# Patient Record
Sex: FEMALE | Race: WHITE | NOT HISPANIC OR LATINO | Employment: OTHER | URBAN - METROPOLITAN AREA
[De-identification: names, ages, dates, MRNs, and addresses within clinical notes are randomized per-mention and may not be internally consistent; named-entity substitution may affect disease eponyms.]

---

## 2017-01-03 RX ORDER — ZOLPIDEM TARTRATE 5 MG/1
TABLET ORAL
Qty: 30 TABLET | Refills: 2 | Status: SHIPPED | OUTPATIENT
Start: 2017-01-03 | End: 2017-04-10 | Stop reason: SDUPTHER

## 2017-03-07 ENCOUNTER — LAB VISIT (OUTPATIENT)
Dept: LAB | Facility: HOSPITAL | Age: 77
End: 2017-03-07
Attending: INTERNAL MEDICINE
Payer: MEDICARE

## 2017-03-07 DIAGNOSIS — I10 ESSENTIAL HYPERTENSION: ICD-10-CM

## 2017-03-07 LAB
ANION GAP SERPL CALC-SCNC: 9 MMOL/L
BUN SERPL-MCNC: 14 MG/DL
CALCIUM SERPL-MCNC: 9.5 MG/DL
CHLORIDE SERPL-SCNC: 100 MMOL/L
CO2 SERPL-SCNC: 31 MMOL/L
CREAT SERPL-MCNC: 0.8 MG/DL
EST. GFR  (AFRICAN AMERICAN): >60 ML/MIN/1.73 M^2
EST. GFR  (NON AFRICAN AMERICAN): >60 ML/MIN/1.73 M^2
GLUCOSE SERPL-MCNC: 110 MG/DL
POTASSIUM SERPL-SCNC: 3.8 MMOL/L
SODIUM SERPL-SCNC: 140 MMOL/L

## 2017-03-07 PROCEDURE — 36415 COLL VENOUS BLD VENIPUNCTURE: CPT | Mod: PO

## 2017-03-07 PROCEDURE — 80048 BASIC METABOLIC PNL TOTAL CA: CPT

## 2017-03-10 ENCOUNTER — TELEPHONE (OUTPATIENT)
Dept: INTERNAL MEDICINE | Facility: CLINIC | Age: 77
End: 2017-03-10

## 2017-03-10 NOTE — TELEPHONE ENCOUNTER
----- Message from Rosemarie Brooks MD sent at 3/10/2017  8:35 AM CST -----  Please review your lab work and we will discuss at your pending office visit.  Rosemarie Perez

## 2017-03-14 ENCOUNTER — OFFICE VISIT (OUTPATIENT)
Dept: INTERNAL MEDICINE | Facility: CLINIC | Age: 77
End: 2017-03-14
Payer: MEDICARE

## 2017-03-14 VITALS
DIASTOLIC BLOOD PRESSURE: 80 MMHG | WEIGHT: 159.38 LBS | HEART RATE: 75 BPM | HEIGHT: 63 IN | RESPIRATION RATE: 16 BRPM | TEMPERATURE: 98 F | SYSTOLIC BLOOD PRESSURE: 132 MMHG | BODY MASS INDEX: 28.24 KG/M2

## 2017-03-14 DIAGNOSIS — E78.5 HYPERLIPIDEMIA, UNSPECIFIED HYPERLIPIDEMIA TYPE: ICD-10-CM

## 2017-03-14 DIAGNOSIS — J04.0 LARYNGITIS: ICD-10-CM

## 2017-03-14 DIAGNOSIS — I10 ESSENTIAL HYPERTENSION: Primary | ICD-10-CM

## 2017-03-14 DIAGNOSIS — K21.9 GASTROESOPHAGEAL REFLUX DISEASE WITHOUT ESOPHAGITIS: ICD-10-CM

## 2017-03-14 DIAGNOSIS — I70.0 ATHEROSCLEROSIS OF AORTA: ICD-10-CM

## 2017-03-14 PROCEDURE — 1126F AMNT PAIN NOTED NONE PRSNT: CPT | Mod: S$GLB,,, | Performed by: INTERNAL MEDICINE

## 2017-03-14 PROCEDURE — 99499 UNLISTED E&M SERVICE: CPT | Mod: S$GLB,,, | Performed by: INTERNAL MEDICINE

## 2017-03-14 PROCEDURE — 1159F MED LIST DOCD IN RCRD: CPT | Mod: S$GLB,,, | Performed by: INTERNAL MEDICINE

## 2017-03-14 PROCEDURE — 3075F SYST BP GE 130 - 139MM HG: CPT | Mod: S$GLB,,, | Performed by: INTERNAL MEDICINE

## 2017-03-14 PROCEDURE — 1157F ADVNC CARE PLAN IN RCRD: CPT | Mod: S$GLB,,, | Performed by: INTERNAL MEDICINE

## 2017-03-14 PROCEDURE — 99999 PR PBB SHADOW E&M-EST. PATIENT-LVL III: CPT | Mod: PBBFAC,,, | Performed by: INTERNAL MEDICINE

## 2017-03-14 PROCEDURE — 1160F RVW MEDS BY RX/DR IN RCRD: CPT | Mod: S$GLB,,, | Performed by: INTERNAL MEDICINE

## 2017-03-14 PROCEDURE — 3079F DIAST BP 80-89 MM HG: CPT | Mod: S$GLB,,, | Performed by: INTERNAL MEDICINE

## 2017-03-14 PROCEDURE — 99214 OFFICE O/P EST MOD 30 MIN: CPT | Mod: S$GLB,,, | Performed by: INTERNAL MEDICINE

## 2017-03-14 RX ORDER — ESOMEPRAZOLE MAGNESIUM 40 MG/1
40 CAPSULE, DELAYED RELEASE ORAL EVERY MORNING
Refills: 1 | COMMUNITY
Start: 2017-02-17 | End: 2017-03-29

## 2017-03-14 RX ORDER — OMEPRAZOLE 20 MG/1
20 CAPSULE, DELAYED RELEASE ORAL DAILY
Refills: 0 | COMMUNITY
Start: 2017-02-08 | End: 2019-01-14 | Stop reason: SDUPTHER

## 2017-03-14 NOTE — PROGRESS NOTES
CC: HTN    76 y.o. female presents for HTN  Tx : chlorthalidone 25mg  Denied HA or dizziness  BP today==>132/80    Hoarseness, 7 weeks  No sore throat, went to  and had steroid injection and antibiotic injection and Rx antibioitic  When didn't recover and on rec of     ENT Consult, , ~   6 weeks ago, s/p scope , unremarkable and CT of head and neck  Also unremarkable, then started   Tx: Speech therapy, and resume PPI  Now w/ increased voice volume    GERD/tx resumed PPI ~ 6 wks ago  Pt is asx, ENT rec restart PPI @ o/v    HLD/aortic atherosclerosis, tx diet  Denied angina or SOB      MEDCARD: Reviewed  ROS:  No fever, chills ,or night sweats  No chest pain or palpitations  + initial cough, barking when occurs, infrequent w/o SOB or wheezing  No focal deficits or paresthesia  Remainder of review negative except as previously noted    PMHX: Reviewed  SHX: Reviewed  FHX: Reviewed    PE:  VSS:  GEN: WDWN, A&O, NAD, conversant and co-operative; pleasant as always  ++ hoarseness   EYES: Conj/lids unremarkable, sclera anicteric  ENT: Canals w/ some cerumen, right TM - injected , left unremarkable  Nasal mucosa/turbinates, w/o exudate or edema  O/P injected w/o exudate or edema; sinus NT  NECK: Supple w/o LN or TM  RESP: efforts unlabored, lungs CTA  CV: Heart RRR, no MGR, no carotid bruits noted  GI: BS + , soft, NT/ND, - HSM noted  MSK: Gait normal. No CCE  NEURO: BALES. No tremor or facial asymmetry  SKIN: Warm and dry    IMPRESSION:  HTN, improved  Hoarseness, laryngitis, w/up negative to date; improved w/ ST  GERD, resumed PPI  HLD, asx  Aortic atherosclerosis,asx    PLAN:  Continue present meds  Potassium rich foods  Reviewed GI precautions  Avoid fried, spicy, acidic foods  Avoid caffeine  Avoid carbonated beverages  Avoid ETOH  Avoid large meals  Avoid eating w/i 2 hrs of hs or exercise  Small, frequent meals best  Call prn  RTC 4 mos w/ lipid panel and CMP

## 2017-03-15 ENCOUNTER — TELEPHONE (OUTPATIENT)
Dept: INTERNAL MEDICINE | Facility: CLINIC | Age: 77
End: 2017-03-15

## 2017-03-27 ENCOUNTER — LAB VISIT (OUTPATIENT)
Dept: LAB | Facility: HOSPITAL | Age: 77
End: 2017-03-27
Attending: FAMILY MEDICINE
Payer: MEDICARE

## 2017-03-27 ENCOUNTER — OFFICE VISIT (OUTPATIENT)
Dept: INTERNAL MEDICINE | Facility: CLINIC | Age: 77
End: 2017-03-27
Payer: MEDICARE

## 2017-03-27 VITALS
HEIGHT: 63 IN | WEIGHT: 153.69 LBS | HEART RATE: 80 BPM | SYSTOLIC BLOOD PRESSURE: 110 MMHG | DIASTOLIC BLOOD PRESSURE: 76 MMHG | BODY MASS INDEX: 27.23 KG/M2 | TEMPERATURE: 98 F | RESPIRATION RATE: 16 BRPM

## 2017-03-27 DIAGNOSIS — R79.9 ABNORMAL FINDING OF BLOOD CHEMISTRY: ICD-10-CM

## 2017-03-27 DIAGNOSIS — R49.0 HOARSENESS, PERSISTENT: ICD-10-CM

## 2017-03-27 DIAGNOSIS — Z00.00 ROUTINE MEDICAL EXAM: ICD-10-CM

## 2017-03-27 DIAGNOSIS — J20.9 ACUTE BRONCHITIS, UNSPECIFIED ORGANISM: Primary | ICD-10-CM

## 2017-03-27 DIAGNOSIS — J06.9 UPPER RESPIRATORY TRACT INFECTION, UNSPECIFIED TYPE: ICD-10-CM

## 2017-03-27 LAB
ALBUMIN SERPL BCP-MCNC: 3 G/DL
ALP SERPL-CCNC: 77 U/L
ALT SERPL W/O P-5'-P-CCNC: 36 U/L
ANION GAP SERPL CALC-SCNC: 11 MMOL/L
AST SERPL-CCNC: 22 U/L
BASOPHILS # BLD AUTO: 0.02 K/UL
BASOPHILS NFR BLD: 0.2 %
BILIRUB SERPL-MCNC: 0.8 MG/DL
BUN SERPL-MCNC: 26 MG/DL
CALCIUM SERPL-MCNC: 9.5 MG/DL
CHLORIDE SERPL-SCNC: 97 MMOL/L
CO2 SERPL-SCNC: 28 MMOL/L
CREAT SERPL-MCNC: 1.5 MG/DL
DIFFERENTIAL METHOD: ABNORMAL
EOSINOPHIL # BLD AUTO: 0 K/UL
EOSINOPHIL NFR BLD: 0.3 %
ERYTHROCYTE [DISTWIDTH] IN BLOOD BY AUTOMATED COUNT: 13.6 %
EST. GFR  (AFRICAN AMERICAN): 39.3 ML/MIN/1.73 M^2
EST. GFR  (NON AFRICAN AMERICAN): 34.1 ML/MIN/1.73 M^2
GLUCOSE SERPL-MCNC: 131 MG/DL
HCT VFR BLD AUTO: 37.6 %
HGB BLD-MCNC: 12.6 G/DL
LYMPHOCYTES # BLD AUTO: 1.1 K/UL
LYMPHOCYTES NFR BLD: 9.9 %
MCH RBC QN AUTO: 30.5 PG
MCHC RBC AUTO-ENTMCNC: 33.5 %
MCV RBC AUTO: 91 FL
MONOCYTES # BLD AUTO: 1.4 K/UL
MONOCYTES NFR BLD: 13 %
NEUTROPHILS # BLD AUTO: 8.4 K/UL
NEUTROPHILS NFR BLD: 76 %
PLATELET # BLD AUTO: 194 K/UL
PMV BLD AUTO: 10.6 FL
POTASSIUM SERPL-SCNC: 3.9 MMOL/L
PROT SERPL-MCNC: 7.5 G/DL
RBC # BLD AUTO: 4.13 M/UL
SODIUM SERPL-SCNC: 136 MMOL/L
WBC # BLD AUTO: 11.1 K/UL

## 2017-03-27 PROCEDURE — 1160F RVW MEDS BY RX/DR IN RCRD: CPT | Mod: S$GLB,,, | Performed by: FAMILY MEDICINE

## 2017-03-27 PROCEDURE — 99999 PR PBB SHADOW E&M-EST. PATIENT-LVL III: CPT | Mod: PBBFAC,,, | Performed by: FAMILY MEDICINE

## 2017-03-27 PROCEDURE — 85025 COMPLETE CBC W/AUTO DIFF WBC: CPT

## 2017-03-27 PROCEDURE — 99214 OFFICE O/P EST MOD 30 MIN: CPT | Mod: S$GLB,,, | Performed by: FAMILY MEDICINE

## 2017-03-27 PROCEDURE — 1157F ADVNC CARE PLAN IN RCRD: CPT | Mod: S$GLB,,, | Performed by: FAMILY MEDICINE

## 2017-03-27 PROCEDURE — 36415 COLL VENOUS BLD VENIPUNCTURE: CPT | Mod: PO

## 2017-03-27 PROCEDURE — 3074F SYST BP LT 130 MM HG: CPT | Mod: S$GLB,,, | Performed by: FAMILY MEDICINE

## 2017-03-27 PROCEDURE — 3078F DIAST BP <80 MM HG: CPT | Mod: S$GLB,,, | Performed by: FAMILY MEDICINE

## 2017-03-27 PROCEDURE — 1159F MED LIST DOCD IN RCRD: CPT | Mod: S$GLB,,, | Performed by: FAMILY MEDICINE

## 2017-03-27 PROCEDURE — 1126F AMNT PAIN NOTED NONE PRSNT: CPT | Mod: S$GLB,,, | Performed by: FAMILY MEDICINE

## 2017-03-27 PROCEDURE — 80053 COMPREHEN METABOLIC PANEL: CPT

## 2017-03-27 RX ORDER — AZITHROMYCIN 250 MG/1
TABLET, FILM COATED ORAL
COMMUNITY
Start: 2017-03-25 | End: 2017-04-03

## 2017-03-29 DIAGNOSIS — N17.9 AKI (ACUTE KIDNEY INJURY): Primary | ICD-10-CM

## 2017-03-29 NOTE — PROGRESS NOTES
"Subjective:   Patient ID: Zack Hopper is a 76 y.o. female.    Chief Complaint: Fever; Chills; Cough; and URI      HPI  75 yo female here with her . Pt reports occ subjective fever, chills, and continuing cough. She reports illness for some time. she still has hoarseness. Saw ENT and had scope and had CT that was normal. Voice "not much better" and saw pcp 14 days ago for same problem. Reports "continuous" coughing that has improved significantly; not once did she cough in the room.     Her chief complaint is ongoing fatigue re illness.  requests TSH and blood cultures and I defer after my educ to him why.    Patient queried and denies any further complaints.    ALLERGIES AND MEDICATIONS: updated and reviewed.  Review of patient's allergies indicates:   Allergen Reactions    Sulfacetamide      Other reaction(s): Unknown       Current Outpatient Prescriptions:     azithromycin (Z-LD) 250 MG tablet, , Disp: , Rfl:     chlorthalidone (HYGROTEN) 25 MG Tab, Take 1 tablet (25 mg total) by mouth once daily., Disp: 90 tablet, Rfl: 1    ibuprofen (ADVIL) 200 MG tablet, Take 200 mg by mouth as needed. 1 Tablet Oral .  Last taken 12/14/11, Disp: , Rfl:     omeprazole (PRILOSEC) 20 MG capsule, Take 20 mg by mouth once daily., Disp: , Rfl: 0    zolpidem (AMBIEN) 5 MG Tab, take 1 tablet by mouth at bedtime if needed, Disp: 30 tablet, Rfl: 2    Review of Systems   Constitutional: Positive for appetite change and fatigue. Negative for chills and diaphoresis.   HENT: Positive for nosebleeds, rhinorrhea and voice change. Negative for sinus pressure, sneezing, sore throat, tinnitus and trouble swallowing.    Eyes: Negative for photophobia and visual disturbance.   Respiratory: Positive for cough. Negative for choking and wheezing.    Cardiovascular: Negative for chest pain and palpitations.   Gastrointestinal: Negative for abdominal distention, abdominal pain, constipation, diarrhea and vomiting. " "  Endocrine: Negative for polydipsia, polyphagia and polyuria.   Genitourinary: Negative for dysuria and flank pain.   Musculoskeletal: Negative for back pain and gait problem.   Skin: Negative for rash and wound.   Allergic/Immunologic: Negative for environmental allergies, food allergies and immunocompromised state.   Neurological: Negative for dizziness, light-headedness and numbness.   Hematological: Negative for adenopathy. Does not bruise/bleed easily.   Psychiatric/Behavioral: Positive for dysphoric mood. Negative for agitation, behavioral problems and confusion.       Objective:     Vitals:    03/27/17 0921   BP: 110/76   Pulse: 80   Resp: 16   Temp: 97.7 °F (36.5 °C)   TempSrc: Oral   Weight: 69.7 kg (153 lb 10.6 oz)   Height: 5' 3" (1.6 m)   PainSc: 0-No pain     Body mass index is 27.22 kg/(m^2).    Physical Exam   Constitutional: She is oriented to person, place, and time. She appears well-developed and well-nourished. She is cooperative. She does not have a sickly appearance. No distress.   HENT:   Head: Normocephalic and atraumatic.   Right Ear: Hearing, tympanic membrane, external ear and ear canal normal. No tenderness.   Left Ear: Hearing, tympanic membrane, external ear and ear canal normal. No tenderness.   Nose: Nose normal.   Mouth/Throat: Oropharynx is clear and moist. Normal dentition. No oropharyngeal exudate, posterior oropharyngeal edema or posterior oropharyngeal erythema.   Eyes: Conjunctivae, EOM and lids are normal. Pupils are equal, round, and reactive to light. Right eye exhibits no discharge. Left eye exhibits no discharge. Right conjunctiva is not injected. Left conjunctiva is not injected. No scleral icterus. Right eye exhibits normal extraocular motion. Left eye exhibits normal extraocular motion.   Neck: Normal range of motion. Neck supple. No JVD present. Carotid bruit is not present. No tracheal deviation and no edema present. No thyromegaly present.   Cardiovascular: Normal " rate, regular rhythm, normal heart sounds and normal pulses.  Exam reveals no friction rub.    No murmur heard.  Pulmonary/Chest: Effort normal and breath sounds normal. No accessory muscle usage or stridor. No respiratory distress. She has no wheezes. She has no rhonchi. She has no rales.   Abdominal: Soft. Bowel sounds are normal. She exhibits no distension. There is no tenderness.   Musculoskeletal: She exhibits no edema.   Lymphadenopathy:        Head (right side): No submandibular adenopathy present.        Head (left side): No submandibular adenopathy present.     She has no cervical adenopathy.   Neurological: She is alert and oriented to person, place, and time.   Skin: Skin is warm and dry. She is not diaphoretic.   Psychiatric: Her speech is normal. Her mood appears not anxious. Her affect is blunt. Her affect is not angry, not labile and not inappropriate. She is not withdrawn. She does not exhibit a depressed mood.   Very flat affect   relates 90% of hx  There are times when it is unclear if this hoarseness is not exaggerated by pt  She is attentive.       Assessment and Plan:   Zack was seen today for fever, chills, cough and uri.    Diagnoses and all orders for this visit:    Hoarseness, persistent--fu speech tx--advil as directed    Acute bronchitis.   Cont mucinex, zyrtec, hydrate, rest.     Routine medical exam  -     CBC auto differential; Future  -     Comprehensive metabolic panel; Future    Abnormal finding of blood chemistry   -     CBC auto differential; Future    Time spent in the evaluation and management of this patient exceeded 45 min and greater than 50% of this time was in face-to-face education regarding diagnoses, medications, plan, and follow-up.      Return in about 2 weeks (around 4/10/2017), or if symptoms worsen or fail to improve.    THIS NOTE WILL BE SHARED WITH THE PATIENT.

## 2017-03-31 ENCOUNTER — LAB VISIT (OUTPATIENT)
Dept: LAB | Facility: HOSPITAL | Age: 77
End: 2017-03-31
Attending: FAMILY MEDICINE
Payer: MEDICARE

## 2017-03-31 DIAGNOSIS — N17.9 AKI (ACUTE KIDNEY INJURY): ICD-10-CM

## 2017-03-31 LAB
ANION GAP SERPL CALC-SCNC: 12 MMOL/L
BUN SERPL-MCNC: 20 MG/DL
CALCIUM SERPL-MCNC: 9.2 MG/DL
CHLORIDE SERPL-SCNC: 104 MMOL/L
CO2 SERPL-SCNC: 26 MMOL/L
CREAT SERPL-MCNC: 1.1 MG/DL
EST. GFR  (AFRICAN AMERICAN): 57.2 ML/MIN/1.73 M^2
EST. GFR  (NON AFRICAN AMERICAN): 49.6 ML/MIN/1.73 M^2
GLUCOSE SERPL-MCNC: 110 MG/DL
POTASSIUM SERPL-SCNC: 4.2 MMOL/L
SODIUM SERPL-SCNC: 142 MMOL/L

## 2017-03-31 PROCEDURE — 80048 BASIC METABOLIC PNL TOTAL CA: CPT

## 2017-03-31 PROCEDURE — 36415 COLL VENOUS BLD VENIPUNCTURE: CPT | Mod: PO

## 2017-04-03 ENCOUNTER — OFFICE VISIT (OUTPATIENT)
Dept: INTERNAL MEDICINE | Facility: CLINIC | Age: 77
End: 2017-04-03
Payer: MEDICARE

## 2017-04-03 VITALS
HEIGHT: 63 IN | RESPIRATION RATE: 18 BRPM | BODY MASS INDEX: 27.15 KG/M2 | WEIGHT: 153.25 LBS | TEMPERATURE: 98 F | SYSTOLIC BLOOD PRESSURE: 130 MMHG | DIASTOLIC BLOOD PRESSURE: 74 MMHG | HEART RATE: 76 BPM

## 2017-04-03 DIAGNOSIS — J11.1 FLU SYNDROME: Primary | ICD-10-CM

## 2017-04-03 DIAGNOSIS — E86.0 DEHYDRATION, MILD: ICD-10-CM

## 2017-04-03 DIAGNOSIS — J37.0 LARYNGITIS, CHRONIC: ICD-10-CM

## 2017-04-03 DIAGNOSIS — N17.9 AKI (ACUTE KIDNEY INJURY): ICD-10-CM

## 2017-04-03 DIAGNOSIS — R53.83 FATIGUE, UNSPECIFIED TYPE: ICD-10-CM

## 2017-04-03 PROCEDURE — 1126F AMNT PAIN NOTED NONE PRSNT: CPT | Mod: S$GLB,,, | Performed by: INTERNAL MEDICINE

## 2017-04-03 PROCEDURE — 99499 UNLISTED E&M SERVICE: CPT | Mod: S$GLB,,, | Performed by: INTERNAL MEDICINE

## 2017-04-03 PROCEDURE — 1160F RVW MEDS BY RX/DR IN RCRD: CPT | Mod: S$GLB,,, | Performed by: INTERNAL MEDICINE

## 2017-04-03 PROCEDURE — 99214 OFFICE O/P EST MOD 30 MIN: CPT | Mod: S$GLB,,, | Performed by: INTERNAL MEDICINE

## 2017-04-03 PROCEDURE — 1159F MED LIST DOCD IN RCRD: CPT | Mod: S$GLB,,, | Performed by: INTERNAL MEDICINE

## 2017-04-03 PROCEDURE — 3078F DIAST BP <80 MM HG: CPT | Mod: S$GLB,,, | Performed by: INTERNAL MEDICINE

## 2017-04-03 PROCEDURE — 3075F SYST BP GE 130 - 139MM HG: CPT | Mod: S$GLB,,, | Performed by: INTERNAL MEDICINE

## 2017-04-03 PROCEDURE — 1157F ADVNC CARE PLAN IN RCRD: CPT | Mod: S$GLB,,, | Performed by: INTERNAL MEDICINE

## 2017-04-03 PROCEDURE — 99999 PR PBB SHADOW E&M-EST. PATIENT-LVL III: CPT | Mod: PBBFAC,,, | Performed by: INTERNAL MEDICINE

## 2017-04-03 NOTE — PROGRESS NOTES
CC: Flu f/up ( spouse +)    75 yo patient presents w/ resolving flu sx  Sx started:: 10+ days ago  ++ Fever,up to 102.9, ++ chills, and ++ night sweats, changing PJ's and sheets 3-4 x daily:  Tx; Zpak and steroid injection @ [facility]  Sudden onset  ++Myalgias  And arthralgias  Pt reported feeling 50+5 better, spouse reported much better than that even    Reviewed lab, YANN, w/ eGFR==>34, now 49.6  Pt /spouse reported she has been trying to hydrate, water and powerade  PT reported fatigue that she has not experienced w/ infection in the past    Reviewed EGD, 2015, ++ esophagitis, tx PPI, has on hold w/ illness    ROS:  No HA or focal deficits  + persistent laryngitis, ENT evaluation, no info on w/up received from her ENT  No diarrhea or constipation  ROS ; neg except as previously noted    PE:  VSS  GEN: WDWN, A&O, conversant and co-operative, pleasant, appeared fatigue  ++ laryngitis ( spouse reported pt improving w/ SPeech Therapy)  EYES: Conjunctiva/lids unremarkable  sclslra anicteric,PERRL, EOMI  ENT: Hearing intact; canals w/o significant cerumen; TM's unremarkable   Nasal mucosa/turbinates unremarkable injected boggy erythematous edematous  O/p unremarkable   Sinus NT   Stridor none  ++ Hoarseness   NECK: Supple w/o lymphadenopathy or thyromegaly; trachea midline  RESP: Efforts unlabored  LUNGS: CTAP  CV: RRR w/o mgr, , 1+ pedal pulses, no LE edema  GI: BS+, soft,, NT/ND, no HSM noted  MSK: Gait normal, no CCE  NEURO: BALES,, no tremor noted  SKIN: Warm and dry; slight tenting    IMPRESSION:  Flu, resolving  YANN, improved  Laryngitis, subacute  Fatigue, multifactorial  Dehydration, mild  Sleep disturbance, spouse reported exacerbated   pt to call for Sleep Consult when fatigue lessens    PLAN:  Vigorous hydration - water best  Acetaminophen  Reviewed lab, diet, exericse  Call prn  RTC 3 mos  30' o/v > 50% spent in review, rec, and MDM

## 2017-04-10 RX ORDER — ZOLPIDEM TARTRATE 5 MG/1
TABLET ORAL
Qty: 30 TABLET | Refills: 2 | Status: SHIPPED | OUTPATIENT
Start: 2017-04-10 | End: 2017-07-26 | Stop reason: SDUPTHER

## 2017-05-03 ENCOUNTER — HOSPITAL ENCOUNTER (OUTPATIENT)
Dept: RADIOLOGY | Facility: HOSPITAL | Age: 77
Discharge: HOME | End: 2017-05-03
Attending: FAMILY MEDICINE
Payer: MEDICARE

## 2017-05-03 ENCOUNTER — OFFICE VISIT (OUTPATIENT)
Dept: INTERNAL MEDICINE | Facility: CLINIC | Age: 77
End: 2017-05-03
Payer: MEDICARE

## 2017-05-03 ENCOUNTER — NURSE TRIAGE (OUTPATIENT)
Dept: ADMINISTRATIVE | Facility: CLINIC | Age: 77
End: 2017-05-03

## 2017-05-03 VITALS
SYSTOLIC BLOOD PRESSURE: 128 MMHG | DIASTOLIC BLOOD PRESSURE: 78 MMHG | WEIGHT: 161.19 LBS | BODY MASS INDEX: 28.56 KG/M2 | TEMPERATURE: 98 F | HEART RATE: 78 BPM | RESPIRATION RATE: 16 BRPM | HEIGHT: 63 IN

## 2017-05-03 DIAGNOSIS — J11.1 FLU SYNDROME: ICD-10-CM

## 2017-05-03 DIAGNOSIS — R06.00 DYSPNEA, UNSPECIFIED TYPE: Primary | ICD-10-CM

## 2017-05-03 DIAGNOSIS — R06.00 DYSPNEA, UNSPECIFIED TYPE: ICD-10-CM

## 2017-05-03 DIAGNOSIS — R49.0 CHRONIC HOARSENESS: ICD-10-CM

## 2017-05-03 PROCEDURE — 71020 XR CHEST PA AND LATERAL: CPT | Mod: TC,PO

## 2017-05-03 PROCEDURE — 99999 PR PBB SHADOW E&M-EST. PATIENT-LVL III: CPT | Mod: 25,PBBFAC,, | Performed by: FAMILY MEDICINE

## 2017-05-03 PROCEDURE — 71020 XR CHEST PA AND LATERAL: CPT | Mod: 26,,, | Performed by: RADIOLOGY

## 2017-05-03 PROCEDURE — 99499 UNLISTED E&M SERVICE: CPT | Mod: S$GLB,,, | Performed by: FAMILY MEDICINE

## 2017-05-03 PROCEDURE — 1159F MED LIST DOCD IN RCRD: CPT | Mod: S$GLB,,, | Performed by: FAMILY MEDICINE

## 2017-05-03 PROCEDURE — 3078F DIAST BP <80 MM HG: CPT | Mod: S$GLB,,, | Performed by: FAMILY MEDICINE

## 2017-05-03 PROCEDURE — 1160F RVW MEDS BY RX/DR IN RCRD: CPT | Mod: S$GLB,,, | Performed by: FAMILY MEDICINE

## 2017-05-03 PROCEDURE — 1126F AMNT PAIN NOTED NONE PRSNT: CPT | Mod: S$GLB,,, | Performed by: FAMILY MEDICINE

## 2017-05-03 PROCEDURE — 3074F SYST BP LT 130 MM HG: CPT | Mod: S$GLB,,, | Performed by: FAMILY MEDICINE

## 2017-05-03 PROCEDURE — 99214 OFFICE O/P EST MOD 30 MIN: CPT | Mod: S$GLB,,, | Performed by: FAMILY MEDICINE

## 2017-05-03 NOTE — PROGRESS NOTES
Subjective:   Patient ID: Zack Hopper is a 76 y.o. female.    Chief Complaint: Fatigue      HPI  77 yo female with long recovery from influenza and resultant bronchitis is here today with occasional dyspnea on exertion. She thinks her breathing is steady improving and she is overall getting slowly better but her  (who is not present today) was concerned. She also has had significant hoarseness since January. She saw ENT and is continuing speech therapy and her voice is still very hoarse but has improved significantly.     Since her illness, she has not been walking for exercise like she did before. She realizes she cannot now walk distances like she did before January but she does feel more fatigued and short of breath than she did BEFORE the illness; again, she feels like the dyspnea she does have at times now has IMPROVED since the height of her acute bronchitis and flu syndrome.     She denies any PND and sleeps on 2 pillows since January. She denies any LE edema.   She denies any chest pain or pressure or palpitations or near syncope.    Patient queried and denies any further complaints.      ALLERGIES AND MEDICATIONS: updated and reviewed.  Review of patient's allergies indicates:   Allergen Reactions    Sulfacetamide      Other reaction(s): Unknown       Current Outpatient Prescriptions:     chlorthalidone (HYGROTEN) 25 MG Tab, Take 1 tablet (25 mg total) by mouth once daily., Disp: 90 tablet, Rfl: 1    ibuprofen (ADVIL) 200 MG tablet, Take 200 mg by mouth as needed. 1 Tablet Oral .  Last taken 12/14/11, Disp: , Rfl:     omeprazole (PRILOSEC) 20 MG capsule, Take 20 mg by mouth once daily., Disp: , Rfl: 0    zolpidem (AMBIEN) 5 MG Tab, take 1 tablet by mouth at bedtime if needed, Disp: 30 tablet, Rfl: 2    Review of Systems   Constitutional: Positive for fatigue. Negative for activity change, appetite change, chills, diaphoresis and fever.   HENT: Positive for voice change. Negative for  "congestion, facial swelling, hearing loss, mouth sores and nosebleeds.    Eyes: Negative for photophobia and redness.   Respiratory: Negative for apnea, cough, choking, chest tightness and wheezing.    Cardiovascular: Negative for chest pain, palpitations and leg swelling.   Gastrointestinal: Negative for abdominal distention, abdominal pain, constipation, diarrhea, nausea and vomiting.   Endocrine: Negative for polyphagia and polyuria.   Genitourinary: Negative for dysuria and flank pain.   Musculoskeletal: Negative for back pain, joint swelling, myalgias and neck pain.   Skin: Negative for rash and wound.   Allergic/Immunologic: Negative for environmental allergies, food allergies and immunocompromised state.   Neurological: Negative for dizziness, seizures and numbness.   Hematological: Negative for adenopathy. Does not bruise/bleed easily.   Psychiatric/Behavioral: Negative for decreased concentration, dysphoric mood, self-injury, sleep disturbance and suicidal ideas. The patient is not nervous/anxious.        Objective:     Vitals:    05/03/17 1626   BP: 128/78   Pulse: 78   Resp: 16   Temp: 98 °F (36.7 °C)   TempSrc: Oral   Weight: 73.1 kg (161 lb 2.5 oz)   Height: 5' 3" (1.6 m)   PainSc: 0-No pain     Body mass index is 28.55 kg/(m^2).    Physical Exam   Constitutional: She is oriented to person, place, and time. She appears well-developed and well-nourished.   HENT:   Head: Normocephalic and atraumatic.   Very hoarse but still much less than I recall during last visit with me   Cardiovascular: Normal rate, regular rhythm and normal heart sounds.    No lower ext edema   Pulmonary/Chest: Effort normal and breath sounds normal. She has no rales.   Neurological: She is alert and oriented to person, place, and time.   Skin: Skin is warm and dry.   Psychiatric: She has a normal mood and affect. Her behavior is normal. Thought content normal.   Nursing note and vitals reviewed.      Assessment and Plan:   Zack" was seen today for fatigue.    Diagnoses and all orders for this visit:    Dyspnea, unspecified type  -     2D Echo w/ Color Flow Doppler; Future  -     Brain natriuretic peptide; Future  -     Basic metabolic panel; Future  -     CBC auto differential; Future  -     X-Ray Chest PA And Lateral; Future  Cont exercise as tolerated  Sodium restriction for now. No fluid restriction.     Essential hypertension, controlled. Cont chlorthalidone.    Chronic hoarseness  Cont speech therapy. Consider revisit to ENT.    Flu syndrome  Improving steadily  Cont exercise as tolerated    Time spent in the evaluation and management of this patient exceeded 45 min and greater than 50% of this time was in face-to-face education regarding diagnoses, medications, plan, and follow-up.    Return in about 2 weeks (around 5/17/2017).    THIS NOTE WILL BE SHARED WITH THE PATIENT.

## 2017-05-03 NOTE — MR AVS SNAPSHOT
[facility]   [facility]  [place] [address]  Phone: [phone]  Fax: [phone]                  Zack Hopper   5/3/2017 4:20 PM   Office Visit    Description:  Female : 1940   Provider:  Maxi Brandt MD   Department:  [facility]           Reason for Visit     Fatigue           Diagnoses this Visit        Comments    Dyspnea, unspecified type    -  Primary     Chronic hoarseness         Flu syndrome                To Do List           Future Appointments        Provider Department Dept Phone    2017 9:00 AM LAB, [facility] [phone]    2017 1:40 PM Rosemarie Brooks MD [facility] [phone]      Goals (5 Years of Data)     None      Follow-Up and Disposition     Return in about 2 weeks (around 2017).      [facility]     [facility] -  Assistance  Unless otherwise directed by your provider, please contact [facility], our nurse care line that is available for  assistance.     Registered nurses in the [facility] provide: appointment scheduling, clinical advisement, health education, and other advisory services.  Call: [phone] (toll free)               Medications           Message regarding Medications     Verify the changes and/or additions to your medication regime listed below are the same as discussed with your clinician today.  If any of these changes or additions are incorrect, please notify your healthcare provider.             Verify that the below list of medications is an accurate representation of the medications you are currently taking.  If none reported, the list may be blank. If incorrect, please contact your healthcare provider. Carry this list with you in case of emergency.           Current Medications     chlorthalidone (HYGROTEN) 25 MG Tab Take 1 tablet (25 mg total) by mouth once daily.     "ibuprofen (ADVIL) 200 MG tablet Take 200 mg by mouth as needed. 1 Tablet Oral .  Last taken 12/14/11    omeprazole (PRILOSEC) 20 MG capsule Take 20 mg by mouth once daily.    zolpidem (AMBIEN) 5 MG Tab take 1 tablet by mouth at bedtime if needed           Clinical Reference Information           Your Vitals Were     BP Pulse Temp Resp Height Weight    128/78 (BP Location: Left arm, Patient Position: Sitting, BP Method: Manual) 78 98 °F (36.7 °C) (Oral) 16 5' 3" (1.6 m) 73.1 kg (161 lb 2.5 oz)    BMI                28.55 kg/m2          Blood Pressure          Most Recent Value    BP  128/78      Allergies as of 5/3/2017     Sulfacetamide      Immunizations Administered on Date of Encounter - 5/3/2017     None      Orders Placed During Today's Visit     Future Labs/Procedures Expected by Expires    Basic metabolic panel  5/3/2017 7/2/2018    Brain natriuretic peptide  5/3/2017 7/2/2018    X-Ray Chest PA And Lateral  5/3/2017 5/3/2018    CBC auto differential  6/2/2017 (Approximate) 7/2/2018    2D Echo w/ Color Flow Doppler  As directed 5/3/2018      Instructions      Coping with Shortness of Breath: Controlling Stress  When you have lung problems, it may be hard for you to breathe. Stress can cause shortness of breath. Learn to relax and control stress to prevent shortness of breath and avoid panic.  When anxiety takes over  When you feel short of breath, your neck, shoulder, and chest muscles tense. You become anxious and begin to breathe faster. Your breathing muscles tire and trap air in your lungs. Your chest may feel tight. Anxiety increases and you may start to panic.  Stop Panic before it starts    The key to controlling panic is to break the cycle before it starts. When you are becoming short of breath do the following:  · Sit, relax your arms and shoulders.  · Lean forward, resting your upper body on your forearms.  · Breathe in slowly through your nose while counting to 2.  · Hold your lips together as if " you are trying to whistle or blow out a candle.  · Breathe out slowly and gently through your pursed lips while counting to 4.  · Repeat these steps as necessary.  Learn to relax  Control stress by avoiding things that trigger stress and by relaxing when you start feeling tense. Find a quiet place and sit or lie in a comfortable position. Close your eyes and try the following:  · Picture yourself in an ideal place, doing something you enjoy. Stay in that place until you feel relaxed.  · Slowly tense, then relax, each part of your body. Start with your toes and work up to your scalp. As you breathe in, tighten the muscles. Keep them tight for several seconds. Then relax as you breathe out.  · You can also relax by doing ramona chi or yoga, praying, meditating, or listening to music or relaxation tapes.  Talk with your health care provider about how you are feeling. It's important for your provider to understand what is going on and how it is affecting your life.  Date Last Reviewed: 9/2/2014 © 2000-2016 [employer]. [address] [place] [address]. All rights reserved. This information is not intended as a substitute for professional medical care. Always follow your healthcare professional's instructions.        Shortness of Breath (Dyspnea)  Shortness of breath is the feeling that you can't catch your breath or get enough air. It is also known as dyspnea.  Dyspnea can be caused by many different conditions. They include:  · Acute asthma attack.  · Worsening of chronic lung diseases such as chronic bronchitis and emphysema.  · Heart failure. This is when weak heart muscle allows extra fluid to collect in the lungs.  · Panic attacks or anxiety. Fear can cause rapid breathing (hyperventilation).  · Pneumonia, or an infection in the lung tissue.  · Exposure to toxic substances, fumes, smoke, or certain medicines.  · Blood clot in the lung (pulmonary embolism). This is often from a piece of blood  clot in a deep vein of the leg (deep vein thrombosis) that breaks off and travels to the lungs.  · Heart attack or heart-related chest pain (angina).  · Anemia.  · Collapsed lung (pneumothorax).  · Dehydration.  · Pregnancy.  Based on your visit today, the exact cause of your shortness of breath is not certain. Your tests dont show any of the serious causes of dyspnea. You may need other tests to find out if you have a serious problem. Its important to watch for any new symptoms or symptoms that get worse. Follow up with your healthcare provider as directed.  Home care  Follow these tips to take care of yourself at home:  · When your symptoms are better, go back to your usual activities.  · If you smoke, you should stop. Join a quit-smoking program or ask your healthcare provider for help.  · Eat a healthy diet and get plenty of sleep.  · Get regular exercise. Talk with your healthcare provider before starting to exercise, especially if you have other medical problems.  · Cut down on the amount of caffeine and stimulants you consume.  Follow-up care  Follow up with your healthcare provider, or as advised.  If tests were done, you will be told if your treatment needs to be changed. You can call as directed for the results.  (Note: If an X-ray was taken, a specialist will review it. You will be notified of any new findings that may affect your care.)  Call 911 or get immediate medical care  Shortness of breath may be a sign of a serious medical problem. For example, it may be a problem with your heart or lungs. Call 911 if you have worsening shortness of breath or trouble breathing, especially with any of the symptoms below:  · You are confused or its difficult to wake you.  · You faint or lose consciousness.  · You have a fast heartbeat, or your heartbeat is irregular.  · You are coughing up blood.  · You have pain in your chest, arm, shoulder, neck, or upper back.  · You break out in a sweat.  When to seek  medical advice  Call your healthcare provider right away if any of these occur:  · Slight shortness of breath or wheezing  · Redness, pain or swelling in your leg, arm, or other body area  · Swelling in both legs or ankles  · Fast weight gain  · Dizziness or weakness  · Fever of 100.4ºF (38ºC) or higher, or as directed by your healthcare provider  Date Last Reviewed: 9/13/2015 © 2000-2016 [employer]. [address] [place] [address], [place] [address]. All rights reserved. This information is not intended as a substitute for professional medical care. Always follow your healthcare professional's instructions.             Language Assistance Services     ATTENTION: Language assistance services are available, free of charge. Please call [phone].      ATENCIÓN: Si riccila leeroy, tiene a perea disposición servicios gratuitos de asistencia lingüística. Llame al [phone].     CHÚ Ý: N?u b?n nói Ti?ng Vi?t, có các d?ch v? h? tr? ngôn ng? mi?n phí dành cho b?n. G?i s? [phone].         [facility] complies with applicable Federal civil rights laws and does not discriminate on the basis of race, color, national origin, age, disability, or sex.

## 2017-05-03 NOTE — PATIENT INSTRUCTIONS
Coping with Shortness of Breath: Controlling Stress  When you have lung problems, it may be hard for you to breathe. Stress can cause shortness of breath. Learn to relax and control stress to prevent shortness of breath and avoid panic.  When anxiety takes over  When you feel short of breath, your neck, shoulder, and chest muscles tense. You become anxious and begin to breathe faster. Your breathing muscles tire and trap air in your lungs. Your chest may feel tight. Anxiety increases and you may start to panic.  Stop Panic before it starts    The key to controlling panic is to break the cycle before it starts. When you are becoming short of breath do the following:  · Sit, relax your arms and shoulders.  · Lean forward, resting your upper body on your forearms.  · Breathe in slowly through your nose while counting to 2.  · Hold your lips together as if you are trying to whistle or blow out a candle.  · Breathe out slowly and gently through your pursed lips while counting to 4.  · Repeat these steps as necessary.  Learn to relax  Control stress by avoiding things that trigger stress and by relaxing when you start feeling tense. Find a quiet place and sit or lie in a comfortable position. Close your eyes and try the following:  · Picture yourself in an ideal place, doing something you enjoy. Stay in that place until you feel relaxed.  · Slowly tense, then relax, each part of your body. Start with your toes and work up to your scalp. As you breathe in, tighten the muscles. Keep them tight for several seconds. Then relax as you breathe out.  · You can also relax by doing ramona chi or yoga, praying, meditating, or listening to music or relaxation tapes.  Talk with your health care provider about how you are feeling. It's important for your provider to understand what is going on and how it is affecting your life.  Date Last Reviewed: 9/2/2014  © 1182-0643 [employer]. [address] [place] [address], [place]  [address]. All rights reserved. This information is not intended as a substitute for professional medical care. Always follow your healthcare professional's instructions.        Shortness of Breath (Dyspnea)  Shortness of breath is the feeling that you can't catch your breath or get enough air. It is also known as dyspnea.  Dyspnea can be caused by many different conditions. They include:  · Acute asthma attack.  · Worsening of chronic lung diseases such as chronic bronchitis and emphysema.  · Heart failure. This is when weak heart muscle allows extra fluid to collect in the lungs.  · Panic attacks or anxiety. Fear can cause rapid breathing (hyperventilation).  · Pneumonia, or an infection in the lung tissue.  · Exposure to toxic substances, fumes, smoke, or certain medicines.  · Blood clot in the lung (pulmonary embolism). This is often from a piece of blood clot in a deep vein of the leg (deep vein thrombosis) that breaks off and travels to the lungs.  · Heart attack or heart-related chest pain (angina).  · Anemia.  · Collapsed lung (pneumothorax).  · Dehydration.  · Pregnancy.  Based on your visit today, the exact cause of your shortness of breath is not certain. Your tests dont show any of the serious causes of dyspnea. You may need other tests to find out if you have a serious problem. Its important to watch for any new symptoms or symptoms that get worse. Follow up with your healthcare provider as directed.  Home care  Follow these tips to take care of yourself at home:  · When your symptoms are better, go back to your usual activities.  · If you smoke, you should stop. Join a quit-smoking program or ask your healthcare provider for help.  · Eat a healthy diet and get plenty of sleep.  · Get regular exercise. Talk with your healthcare provider before starting to exercise, especially if you have other medical problems.  · Cut down on the amount of caffeine and stimulants you consume.  Follow-up care  Follow up  with your healthcare provider, or as advised.  If tests were done, you will be told if your treatment needs to be changed. You can call as directed for the results.  (Note: If an X-ray was taken, a specialist will review it. You will be notified of any new findings that may affect your care.)  Call 911 or get immediate medical care  Shortness of breath may be a sign of a serious medical problem. For example, it may be a problem with your heart or lungs. Call 911 if you have worsening shortness of breath or trouble breathing, especially with any of the symptoms below:  · You are confused or its difficult to wake you.  · You faint or lose consciousness.  · You have a fast heartbeat, or your heartbeat is irregular.  · You are coughing up blood.  · You have pain in your chest, arm, shoulder, neck, or upper back.  · You break out in a sweat.  When to seek medical advice  Call your healthcare provider right away if any of these occur:  · Slight shortness of breath or wheezing  · Redness, pain or swelling in your leg, arm, or other body area  · Swelling in both legs or ankles  · Fast weight gain  · Dizziness or weakness  · Fever of 100.4ºF (38ºC) or higher, or as directed by your healthcare provider  Date Last Reviewed: 9/13/2015  © 4264-6159 [employer]. [address] [place] [address], [place] [address]. All rights reserved. This information is not intended as a substitute for professional medical care. Always follow your healthcare professional's instructions.

## 2017-05-03 NOTE — TELEPHONE ENCOUNTER
Reason for Disposition   MODERATE weakness (i.e., interferes with work, school, normal activities) and cause unknown    Protocols used: ST WEAKNESS (GENERALIZED) AND FATIGUE-A-OH    Pt  states that pt is experiencing fatigue and SOB after exertion. Care advice given. appt made for today.

## 2017-05-04 ENCOUNTER — TELEPHONE (OUTPATIENT)
Dept: INTERNAL MEDICINE | Facility: CLINIC | Age: 77
End: 2017-05-04

## 2017-05-04 NOTE — TELEPHONE ENCOUNTER
"Please call pt and tell her that her chest xray show no signs of a pleural effusion ("fluid") and it is normal. Her labs were normal inc the BNP (N as in [place] is correct) but she was slightly anemic. I would simply repeat a cbc in 2-3 months. Then, if low again do a work-up. Thank you.   "

## 2017-05-04 NOTE — TELEPHONE ENCOUNTER
Called pt; left VM stating labs and xray were both normal; labs showed slight anemia; recommended repeating labs 2-3 months from now. Left my name and call back number for questions and concerns.

## 2017-05-08 ENCOUNTER — CLINICAL SUPPORT (OUTPATIENT)
Dept: CARDIOLOGY | Facility: CLINIC | Age: 77
End: 2017-05-08
Payer: MEDICARE

## 2017-05-08 DIAGNOSIS — R06.00 DYSPNEA, UNSPECIFIED TYPE: ICD-10-CM

## 2017-05-08 LAB
DIASTOLIC DYSFUNCTION: NO
ESTIMATED PA SYSTOLIC PRESSURE: 22.89
MITRAL VALVE REGURGITATION: NORMAL
RETIRED EF AND QEF - SEE NOTES: 65 (ref 55–65)
TRICUSPID VALVE REGURGITATION: NORMAL

## 2017-05-08 PROCEDURE — 93306 TTE W/DOPPLER COMPLETE: CPT | Mod: S$GLB,,, | Performed by: INTERNAL MEDICINE

## 2017-05-09 ENCOUNTER — TELEPHONE (OUTPATIENT)
Dept: INTERNAL MEDICINE | Facility: CLINIC | Age: 77
End: 2017-05-09

## 2017-05-09 DIAGNOSIS — D64.9 ANEMIA, UNSPECIFIED TYPE: Primary | ICD-10-CM

## 2017-05-09 NOTE — TELEPHONE ENCOUNTER
----- Message from Salas Whitfield MA sent at 5/9/2017  2:38 PM CDT -----  Contact: Jayant/spouse-[phone]  Please advise Pt is calling to schedule a Nurse Visit for a B-12 injection. Please call. Thanks!

## 2017-05-09 NOTE — TELEPHONE ENCOUNTER
I see where the pt saw Dr. Brandt recently. Pt was anemia. He recommended to recheck her cbc in 2-3 months. I do not see where vitamin b 12 was discussed.  Please advise.

## 2017-05-09 NOTE — TELEPHONE ENCOUNTER
B 12 not given unless documented B 12 deficiency  Can try and order   or she can start taking b12 1,000mcg SUBLINGUAL daily

## 2017-05-10 NOTE — TELEPHONE ENCOUNTER
Spoke to pt's , Jayant. I advised that the pt was not checked for vitamin B 12. I advised that injections are only recommended for a vitamin B 12 deficiency.  Lab requiring a waiver to be signed.   Jayant given number to [employer] to request a quote on vitamin B 12 lab.  Jayant advised that the pt can take OTC vitamin B 12 1000 mcg SL tablet daily.

## 2017-05-29 ENCOUNTER — INITIAL CONSULT (OUTPATIENT)
Dept: OTOLARYNGOLOGY | Facility: CLINIC | Age: 77
End: 2017-05-29
Payer: MEDICARE

## 2017-05-29 VITALS
TEMPERATURE: 96 F | WEIGHT: 153.25 LBS | SYSTOLIC BLOOD PRESSURE: 129 MMHG | HEART RATE: 75 BPM | BODY MASS INDEX: 27.14 KG/M2 | DIASTOLIC BLOOD PRESSURE: 79 MMHG

## 2017-05-29 DIAGNOSIS — J38.01 UNILATERAL COMPLETE VOCAL FOLD PARALYSIS: ICD-10-CM

## 2017-05-29 DIAGNOSIS — R49.9 VOICE DISTURBANCE: Primary | ICD-10-CM

## 2017-05-29 PROCEDURE — 1126F AMNT PAIN NOTED NONE PRSNT: CPT | Mod: S$GLB,,, | Performed by: OTOLARYNGOLOGY

## 2017-05-29 PROCEDURE — 99999 PR PBB SHADOW E&M-EST. PATIENT-LVL III: CPT | Mod: PBBFAC,,, | Performed by: OTOLARYNGOLOGY

## 2017-05-29 PROCEDURE — 1159F MED LIST DOCD IN RCRD: CPT | Mod: S$GLB,,, | Performed by: OTOLARYNGOLOGY

## 2017-05-29 PROCEDURE — 99204 OFFICE O/P NEW MOD 45 MIN: CPT | Mod: 25,S$GLB,, | Performed by: OTOLARYNGOLOGY

## 2017-05-29 NOTE — PATIENT INSTRUCTIONS
VOCAL FOLD INJECTION AUGMENTATION     Description   If the vocal folds (vocal cords) cannot close completely, you may experience voice problems: roughness, breathiness, inability to get loud, increased vocal effort, increased vocal fatigue. Some patients may also experience aspiration (coughing or choking with swallowing). Vocal fold injection augmentation plumps up the vocal fold and/or repositions it in the midline in order to help the vocal folds close completely while speaking or swallowing. Following the procedure, most patients experience a louder, stronger, clearer voice. The procedure also helps some patients protect against aspiration, although the swallowing improvement is not as dramatic as the voice improvement. We use the following materials for the procedure: hyaluronic acid (Restylane); carboxymethylcellulose (Radiesse Voice Gel); and calcium hydroxyapatite (Radiesse Voice). For most patients, the injection is performed with a small needle passed through the skin of the neck. However, in some patients we perform the injection with a device passed through the mouth. In either case, the injection is guided by the visualization provided by a scope passed through the nose.     What to expect during the procedure   We perform the injection in our office under local (topical) anesthesia. You are awake the whole time, and the entire procedure lasts about 15 minutes. Our primary focus is your safety and comfort. We usually make the larynx numb by spraying the throat and/or dripping anesthetic on the larynx. At this time, you may cough or gag, or you may have the sensation that you spilled some water down the wrong pipe. These are temporary sensations that allow us to get you numb. The numbing process takes about 2 minutes. We will not proceed until we know you will be as comfortable as possible. A small needle is passed either through the skin of the neck or via a long instrument through the mouth to  perform the injection. During the injection, you may experience mild discomfort in the throat. You may feel an unusual sensation in the ear because the larynx and the ear share the same sensory nerve. In rare cases in which a patient does not tolerate the procedure, it may be performed in the operating room, with the patient completely asleep under general anesthesia.     What to expect afterwards   Most patients note a stronger, less effortful voice immediately after the injection. Sometimes the voice is tight or pressed voice is noted right after the injection. The voice usually has good days and bad days and gradually improves until you reach your new baseline voice over the first 1-2 weeks. Voice therapy may be a necessary part of your treatment plan to optimize your vocal outcome. None of the materials we inject are permanent. As the material dissolves, you may experience a gradual worsening of voice quality over the course of several months. At this point we may consider repeating the injection. You may be a candidate for a permanent fix, which involves an open surgery in the neck performed in the operating room.     Instructions: before the procedure   1. Do not take aspirin-containing products or other medications that can thin the blood (such as ibuprofen, Advil, Motrin, Aleve, Plavix) for 7 days prior to the injection. If you take Coumadin (warfarin), you may need to stop using this a few days prior to the injection. If you are on blood thinning (anti-platelet or anti-coagulant) medication and it is not clear what you should do, please clarify this with your physician.   2. On the day of your procedure, please make sure you take your other regular morning medications.   3. On the day of your procedure, it is OK to eat and drink as you would normally, up until 3 hours before to your appointment time. During that time frame, we ask that you restrict yourself only to clear liquids. A clear liquid is anything  you can see through (water, ginger ale, apple juice).     Instructions: after the procedure   1. Please refrain from eating or drinking for 1 hour following the procedure. This allows time for the numbing medication to wear off.   2. Most patients experience very little pain. If necessary, most patients are able to keep comfortable with plain Tylenol (acetaminophen) and/or other non-steroidal anti-inflammatory medications such as ibuprofen (Advil, Motrin). Please follow package instructions if considering taking these medications.   3. In most instances, it is OK to use your voice immediately after the procedure. However, for the first week, you should avoid speaking over heavy background noise or in a very loud voice. It is rare, but in some cases you will be asked to rest your voice for the first 24 hours.   4. Please call the [facility] Center at [phone] if   · You have a temperature above 101°F   · You develop Increasing throat pain not relieved by over-the-counter medications   · You have any other post-operative questions or concerns   5. Please go immediately to the nearest emergency room if you are experiencing   · Shortness of breath   · Difficulty breathing   · Difficulty swallowing   · Severe bleeding     FREQUENTLY ASKED QUESTIONS     Is this a Botox injection? No. Botox weakens the voice box muscles. Instead, with a vocal fold injection augmentation, we are injecting filler material to bulk up the vocal fold(s).     How do you decide which material to use for the injection? Our decision is based on the indication for the procedure, the position of the vocal fold, and other patient historical/anatomical factors. In some instances, the approval of your insurance company is an important factor.     How to you decide which technique to use (through the neck versus through the mouth)? Patient anatomy and the position of the vocal fold play an important role. Other patient factors such as gag reflex are  also strongly considered.     Why do you perform this in your office instead of in the operating room? Performing the procedure in the office is safe and precise. In addition, performing the injection with the patient awake gives us direct visual and auditory feedback, which we do not get when the patient is asleep in the operating room. Furthermore, an office-based injection is much less time consuming, is more convenient for the patient, and does not involve the risks or the recovery time associated with general anesthesia. We can still do this in the operating room; we save that setting for specific diagnoses or situations, as well as for the rare patient who is unable to tolerate the awake procedure.     Why did I have discomfort in my ear (during or after the injection)? This is an example of referred pain. The ear and the larynx share the same sensory nerve.     I got an injection 3 days ago. Why is my voice still hoarse? To optimize vocal outcome, we overinject a little bit. Additionally, there may be a mild amount of swelling. Finally, the muscles of the larynx need to adjust to the injected material. Most people will have good days and bad days at first. After 1-2 weeks, you should settle out to your new baseline voice.     How long does the injection last? Carboxymethylcellulose (Radiesse Voice Gel) lasts approximately 1-2 months. Hyaluronic acid (Restylane) lasts approximately 4 months. Calcium hydroxyapatite (Radiesse Voice) lasts up to 1 year; however its characteristics are such that only few patients are appropriate for using this material.     Is there a permanent injectable material? No.     Can the injection be repeated? Yes. There is no limit to the number of times an injection can be repeated. However, a permanent surgical fix is often an option to consider.

## 2017-05-29 NOTE — PROGRESS NOTES
[facility]  Department of Otorhinolaryngology and Communication Sciences    Zack Hopper is a 76 y.o. female who presents to the [facility]  for consultation at the kind request of Dr. Allen for further management of hoarseness.     She complains of a weak voice, difficulty being understood, difficulty projecting her voice, phonatory dyspnea, increased vocal effort, and increased vocal fatigue. Onset was sudden. Duration is about 4 months, beginning in conjunction with a bad URI in January. Time course is constant. Symptoms are stable. Exacerbating factors include voice use. She denies any alleviating factors. She denies any associated symptoms.     She has been seen by Dr. Allen, who noted a vocal fold paralysis. She obtained a CT neck with contrast dated 2017.  I reviewed the report; relevant details include findings of a left vocal fold paralysis, without any evidence for etiology. She has had some speech therapy and derived some mild benefit.     Voice Handicap Index-10 (VHI-10) score is 33.   Reflux Symptom Index (RSI) score is 0.   Eating Assessment Tool-10 (EAT-10) score is 0.   Dyspnea Index (DI) score is 10.  Cough Severity Index (CSI) score is 0.    Past Medical History  She has a past medical history of DJD (degenerative joint disease); Dyslipidemia; Hypertension; Nuclear sclerosis - Both Eyes; Rosacea; and Vitamin D deficiency disease.    Past Surgical History  She has a past surgical history that includes  section, classic; Total knee arthroplasty; Appendectomy; Cholecystectomy; Shoulder open rotator cuff repair (Right); and breast biopsy, left.    Family History  Her family history includes Breast cancer (age of onset: 77) in her sister; Cataracts in her sister and sister; Diabetes in her brother and maternal grandfather; Hypertension in her mother and sister; Kidney failure in her brother; Other in her brother; Stroke in her father.    Social History  She reports that  she has never smoked. She has never used smokeless tobacco. She reports that she drinks about 0.6 oz of alcohol per week . She reports that she does not use drugs.    Allergies  She is allergic to sulfacetamide.    Medications  She has a current medication list which includes the following prescription(s): chlorthalidone, ibuprofen, omeprazole, and zolpidem.    Review of Systems   Constitutional: Negative for fever.   HENT: Negative for sore throat.    Eyes: Negative for visual disturbance.   Respiratory: Negative for wheezing.    Cardiovascular: Negative for chest pain.   Gastrointestinal: Negative for nausea.   Musculoskeletal: Negative for arthralgias.   Skin: Negative for rash.   Neurological: Negative for tremors.   Hematological: Does not bruise/bleed easily.   Psychiatric/Behavioral: The patient is not nervous/anxious.           Objective:     /79   Pulse 75   Temp (!) 95.7 °F (35.4 °C) (Tympanic)   Wt 69.5 kg (153 lb 3.5 oz)   BMI 27.14 kg/m²    Physical Exam    Constitutional: comfortable, well dressed  Psychiatric: appropriate affect  Respiratory: comfortably breathing, symmetric chest rise, no stridor  Voice: high pitched breathy, asthenic, moderately dysphonic  Cardiovascular: upper extremities non-edematous  Lymphatic: no cervical lymphadenopathy  Neurologic: alert and oriented to time, place, person, and situation; cranial nerves 3-12 grossly intact  Head: normocephalic  Eyes: conjunctivae and sclerae clear  Ears: normal pinnae, normal external auditory canals, tympanic membranes intact  Nose: mucosa pink and noncongested, no masses, no mucopurulence, no polyps  Oral cavity / oropharynx: no mucosal lesions  Neck: soft, full range of motion, laryngotracheal complex palpable with appropriate landmarks, larynx elevates on swallowing  Indirect laryngoscopy: limited due to gag    Procedure  Rigid Laryngeal Videostroboscopy (01362): Laryngeal videostroboscopy is indicated to assess the laryngeal  vibratory biomechanics and vocal fold oscillation, which cannot be assessed with a plain light examination. This was carried out with a 70 degree endoscope. After verbal consent was obtained, the patient was positioned and the tongue was gently secured with a gauze sponge. The endoscope was passed transorally and positioned to image the larynx and hypopharynx in detail. The following featured were examined: laryngeal and hypopharyngeal masses; vocal fold range and symmetry of motion; laryngeal mucosal edema, erythema, inflammation, and hydration; salivary pooling; and gross laryngeal sensation. During phonation, the vocal folds were assesses for glottal closure; mucosal wave; vocal fold lesions; vibratory periodicity, amplitude, and phase symmetry; and vertical height match. The equipment was removed. The patient tolerated the procedure well without complication. All findings were normal except:  - left vocal fold immobile, paramedian, bowed  - glottal insufficiency across all frequencies, moderate posterior gap, mild vertical height mismatch  - supraglottic hyperfunction, compensatory                Data Reviewed    see HPI      Assessment:     Zack Hopper is a 76 y.o. female with idiopathic left vocal fold immobility, potentially recoverable, onset of symptoms January 2017.       Plan:        I had a discussion with the patient and her family regarding her condition and the further workup and management options.      I educated the patient on the prognosis of newly identified vocal fold immobility and emphasized that it may take up to a year for the nerve to demonstrate its full recovery potential. In the meantime, treatment options include the following: a) no treatment and continued observation; b) voice therapy; c) a vocal fold injection augmentation procedure. She desires procedural intervention. The risks and benefits of vocal fold injection augmentation were discussed with the patient. Risks  included but were not limited to bleeding, infection, allergic reaction to the injectable, scarring, worsening of voice, early resorption, need for additional procedures, pain, epistaxis, and airway edema which could necessitate need for intubation or tracheostomy. We informed the patient that while in the past this procedure was performed mainly in the operating room under general anesthesia, we now primarily perform this procedure in our procedure suite without the need for general anesthesia.    All questions were answered and the patient would like to proceed with an office-based left vocal fold injection augmentation procedure. We will plan to use Restylane-L. Informed consent was obtained. We will arrange this in the coming weeks.    Yomi Horowitz M.D.  [facility]  Department of Otorhinolaryngology and Communication Sciences

## 2017-06-09 ENCOUNTER — PROCEDURE VISIT (OUTPATIENT)
Dept: OTOLARYNGOLOGY | Facility: CLINIC | Age: 77
End: 2017-06-09
Payer: MEDICARE

## 2017-06-09 VITALS
WEIGHT: 156.75 LBS | BODY MASS INDEX: 27.77 KG/M2 | TEMPERATURE: 98 F | HEART RATE: 70 BPM | DIASTOLIC BLOOD PRESSURE: 85 MMHG | SYSTOLIC BLOOD PRESSURE: 135 MMHG

## 2017-06-09 DIAGNOSIS — J38.01 UNILATERAL COMPLETE VOCAL FOLD PARALYSIS: Primary | ICD-10-CM

## 2017-06-09 PROCEDURE — 31574 LARGSC W/NJX AUGMENTATION: CPT | Mod: S$GLB,,, | Performed by: OTOLARYNGOLOGY

## 2017-06-09 PROCEDURE — L8607 INJ VOCAL CORD BULKING AGENT: HCPCS | Mod: S$GLB,,, | Performed by: OTOLARYNGOLOGY

## 2017-06-09 NOTE — PROCEDURES
Procedures   [facility]  Department of Otorhinolaryngology and Communication Sciences    Awake Laryngeal Procedure Operative Report    Preoperative Diagnosis: 1. Left vocal fold immobility.  2. Glottal insufficiency.  3. Dysphonia.    Postoperative Diagnosis: 1. Left vocal fold immobility.  2. Glottal insufficiency.  3. Dysphonia.    Procedure: Transnasal flexible magnified laryngoscopy with left vocal fold injection augmentation with hyaluronic acid (Restylane-L). [id number]    Surgeon: Yomi Horowitz M.D.     Estimated blood loss: None.    Anesthesia: Local and topical.    Complications: None.    Findings: Appropriate medialization, convexity, and support with a total volume of 0.9 mL hyaluronic acid (Restylane-L).    Indications for procedure: Zack Hopper is a 76 y.o. 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 The patient presents for elective vocal fold injection augmentation. Risks of the procedure were discussed, including but not limited to bleeding, infection, allergic reaction to the injectable or medication, scarring, worsening of voice, early resorption, need for additional procedures, pain, epistaxis, laryngospasm, and airway obstruction which could necessitate need for intubation or tracheostomy. All questions were answered and the patient agreed to move forward with the procedure. Informed consent was obtained.    Procedure in detail: Zack Hopper was positively identified with two identifiers and was brought into the procedure suite. She was seated upright in a comfortable position. Final time-out was performed for verification purposes. Local cutaneous and subcutaneous anesthesia was achieved with 1 mL of 2% lidocaine  injected into the skin and subcutaneous tissues at the level of the thyroid notch and into the pre-epiglottic space. Oropharyngeal anesthesia was not necessary. The nasal cavity was topically decongested with 1%  phenylephrine and topically anesthetized with 4% lidocaine. The distal chip digital videolaryngoscope was advanced through the patients most patent nasal cavity. The larynx was inspected under maximal magnification, with findings as noted above.    Attention was turned toward anesthetizing the endolarynx, which was achieved with a total volume of 2 mL of 4% lidocaine administered in one aliquot via a trans-cricothyroid membrane injection.    After an adequate degree of anesthesia was obtained, attention was turned to injection augmentation. A syringe pre-loaded with hyaluronic acid (Restylane-L) was loaded onto a 23 gauge 1.5 inch needle. Under the guidance of magnified laryngoscopy, the needle was advanced percutaneously, through the thyrohyoid membrane and infrapetiole epiglottis, into the endolarynx. The needle was advanced into the left vocal fold at the junction of the vocal process with the superior arcuate line. After confirmation of appropriate depth of the needle tip, careful injection augmentation of the left focal fold was carried out. Appropriate fullness, convexity, support, and medialization were achieved, with slight overcorrection, with a total volume of 0.9 mL injected. A pressed but less effortful voice, as well as a stronger cough, were noted immediately. The equipment was removed. The patient tolerated the procedure well without complication. All needle and instrument counts were correct at the completion of the case.    Attestation: As the attending of record, I was present and participated in all portions of this procedure.    Disposition: The patient was observed briefly before being discharged home in good condition. She was instructed to call the office or return to the emergency department should she develop a fever greater than 101 degrees F, increasing pain not relieved by over-the-counter medication, shortness of breath or noisy breathing, difficulty swallowing, swelling, bleeding, or  any other concerns. Post-procedure instructions were provided and were reviewed with the patient, who acknowledged understanding. She will follow up with me in about 4 weeks.    Yomi Horowitz M.D.  [facility]  Department of Otorhinolaryngology and Communication Sciences

## 2017-06-09 NOTE — PATIENT INSTRUCTIONS
VOCAL FOLD INJECTION AUGMENTATION     Description   If the vocal folds (vocal cords) cannot close completely, you may experience voice problems: roughness, breathiness, inability to get loud, increased vocal effort, increased vocal fatigue. Some patients may also experience aspiration (coughing or choking with swallowing). Vocal fold injection augmentation plumps up the vocal fold and/or repositions it in the midline in order to help the vocal folds close completely while speaking or swallowing. Following the procedure, most patients experience a louder, stronger, clearer voice. The procedure also helps some patients protect against aspiration, although the swallowing improvement is not as dramatic as the voice improvement. We use the following materials for the procedure: hyaluronic acid (Restylane); carboxymethylcellulose (Radiesse Voice Gel); and calcium hydroxyapatite (Radiesse Voice). For most patients, the injection is performed with a small needle passed through the skin of the neck. However, in some patients we perform the injection with a device passed through the mouth. In either case, the injection is guided by the visualization provided by a scope passed through the nose.     What to expect during the procedure   We perform the injection in our office under local (topical) anesthesia. You are awake the whole time, and the entire procedure lasts about 15 minutes. Our primary focus is your safety and comfort. We usually make the larynx numb by spraying the throat and/or dripping anesthetic on the larynx. At this time, you may cough or gag, or you may have the sensation that you spilled some water down the wrong pipe. These are temporary sensations that allow us to get you numb. The numbing process takes about 2 minutes. We will not proceed until we know you will be as comfortable as possible. A small needle is passed either through the skin of the neck or via a long instrument through the mouth to  perform the injection. During the injection, you may experience mild discomfort in the throat. You may feel an unusual sensation in the ear because the larynx and the ear share the same sensory nerve. In rare cases in which a patient does not tolerate the procedure, it may be performed in the operating room, with the patient completely asleep under general anesthesia.     What to expect afterwards   Most patients note a stronger, less effortful voice immediately after the injection. Sometimes the voice is tight or pressed voice is noted right after the injection. The voice usually has good days and bad days and gradually improves until you reach your new baseline voice over the first 1-2 weeks. Voice therapy may be a necessary part of your treatment plan to optimize your vocal outcome. None of the materials we inject are permanent. As the material dissolves, you may experience a gradual worsening of voice quality over the course of several months. At this point we may consider repeating the injection. You may be a candidate for a permanent fix, which involves an open surgery in the neck performed in the operating room.     Instructions: before the procedure   1. Do not take aspirin-containing products or other medications that can thin the blood (such as ibuprofen, Advil, Motrin, Aleve, Plavix) for 7 days prior to the injection. If you take Coumadin (warfarin), you may need to stop using this a few days prior to the injection. If you are on blood thinning (anti-platelet or anti-coagulant) medication and it is not clear what you should do, please clarify this with your physician.   2. On the day of your procedure, please make sure you take your other regular morning medications.   3. On the day of your procedure, it is OK to eat and drink as you would normally, up until 3 hours before to your appointment time. During that time frame, we ask that you restrict yourself only to clear liquids. A clear liquid is anything  you can see through (water, ginger ale, apple juice).     Instructions: after the procedure   1. Please refrain from eating or drinking for 1 hour following the procedure. This allows time for the numbing medication to wear off.   2. Most patients experience very little pain. If necessary, most patients are able to keep comfortable with plain Tylenol (acetaminophen) and/or other non-steroidal anti-inflammatory medications such as ibuprofen (Advil, Motrin). Please follow package instructions if considering taking these medications.   3. In most instances, it is OK to use your voice immediately after the procedure. However, for the first week, you should avoid speaking over heavy background noise or in a very loud voice. It is rare, but in some cases you will be asked to rest your voice for the first 24 hours.   4. Please call the [facility] Center at [phone] if   · You have a temperature above 101°F   · You develop Increasing throat pain not relieved by over-the-counter medications   · You have any other post-operative questions or concerns   5. Please go immediately to the nearest emergency room if you are experiencing   · Shortness of breath   · Difficulty breathing   · Difficulty swallowing   · Severe bleeding     FREQUENTLY ASKED QUESTIONS     Is this a Botox injection? No. Botox weakens the voice box muscles. Instead, with a vocal fold injection augmentation, we are injecting filler material to bulk up the vocal fold(s).     How do you decide which material to use for the injection? Our decision is based on the indication for the procedure, the position of the vocal fold, and other patient historical/anatomical factors. In some instances, the approval of your insurance company is an important factor.     How to you decide which technique to use (through the neck versus through the mouth)? Patient anatomy and the position of the vocal fold play an important role. Other patient factors such as gag reflex are  also strongly considered.     Why do you perform this in your office instead of in the operating room? Performing the procedure in the office is safe and precise. In addition, performing the injection with the patient awake gives us direct visual and auditory feedback, which we do not get when the patient is asleep in the operating room. Furthermore, an office-based injection is much less time consuming, is more convenient for the patient, and does not involve the risks or the recovery time associated with general anesthesia. We can still do this in the operating room; we save that setting for specific diagnoses or situations, as well as for the rare patient who is unable to tolerate the awake procedure.     Why did I have discomfort in my ear (during or after the injection)? This is an example of referred pain. The ear and the larynx share the same sensory nerve.     I got an injection 3 days ago. Why is my voice still hoarse? To optimize vocal outcome, we overinject a little bit. Additionally, there may be a mild amount of swelling. Finally, the muscles of the larynx need to adjust to the injected material. Most people will have good days and bad days at first. After 1-2 weeks, you should settle out to your new baseline voice.     How long does the injection last? Carboxymethylcellulose (Radiesse Voice Gel) lasts approximately 1-2 months. Hyaluronic acid (Restylane) lasts approximately 4 months. Calcium hydroxyapatite (Radiesse Voice) lasts up to 1 year; however its characteristics are such that only few patients are appropriate for using this material.     Is there a permanent injectable material? No.     Can the injection be repeated? Yes. There is no limit to the number of times an injection can be repeated. However, a permanent surgical fix is often an option to consider.

## 2017-07-19 NOTE — PROGRESS NOTES
[facility]  Department of Otorhinolaryngology and Communication Sciences    Subjective:      Zack Hopper is a 77 y.o. female who presents for follow-up. She has idiopathic left vocal fold immobility, potentially recoverable, onset of symptoms January 2017.    She underwent injection augmentation as below. She did well with the injection. She has recovered her voice to a great extent. Yet her voice does still remain limited. She reports difficulty projecting her voice and that it fatigues easily, particularly by the end of the day. Nevertheless, she remains pleased with her progress.    TREATMENT HISTORY:  6/9/2017: left vocal fold injection augmentation - Restylane-L    Voice Handicap Index - 10 (VHI-10) score is 16 (down from 33).    The patient's medications, allergies, past medical, surgical, social and family histories were reviewed and updated as appropriate.    A detailed review of systems was obtained with pertinent positives as per the above HPI, and otherwise negative.      Objective:     There were no vitals taken for this visit.     Constitutional: comfortable, well dressed  Psychiatric: appropriate affect  Respiratory: comfortably breathing, symmetric chest rise, no stridor  Voice: mild breathiness, mild asthenia, mild strain  Head: normocephalic  Eyes: conjunctivae and sclerae clear  Indirect laryngoscopy is limited due to gag    Procedure  Rigid Laryngeal Videostroboscopy (31176): Laryngeal videostroboscopy is indicated to assess the laryngeal vibratory biomechanics and vocal fold oscillation, which cannot be assessed with a plain light examination. This was carried out with a 70 degree endoscope. After verbal consent was obtained, the patient was positioned and the tongue was gently secured with a gauze sponge. The endoscope was passed transorally and positioned to image the larynx and hypopharynx in detail. The following featured were examined: laryngeal and hypopharyngeal masses;  vocal fold range and symmetry of motion; laryngeal mucosal edema, erythema, inflammation, and hydration; salivary pooling; and gross laryngeal sensation. During phonation, the vocal folds were assesses for glottal closure; mucosal wave; vocal fold lesions; vibratory periodicity, amplitude, and phase symmetry; and vertical height match. The equipment was removed. The patient tolerated the procedure well without complication. All findings were normal except:  - interval improvement in contour and support of the left vocal fold; immobile, paramedian  - complete glottal closure, though with persistent posterior glottic gap and vertical height mismatch  - compensatory supraglottic hyperfunction      Assessment:     Zack Hopper is a 77 y.o. female with idiopathic left vocal fold immobility, potentially recoverable, onset of symptoms January 2017. She has made progress following injection augmentation.     Plan:     Reassurance was provided. Should she desire additional treatment at this time, it would consist of SLP voice therapy, on which she defers at this time. I counseled her that the injectate usually resorbs over a 3-6 month timeframe. Depending on her progress, she may emerge as a candidate for thyroplasty. She will follow up with me in 3 months, or sooner if needed.    All questions were answered, and the patient is in agreement with the plan.    This visit was 15 minutes in duration, with over 50% of the time spent in direct face-to-face counseling and coordination of care regarding the issues outlined above.    Yomi Horowitz M.D.  [facility]  Department of Otorhinolaryngology and Communication Sciences

## 2017-07-21 ENCOUNTER — LAB VISIT (OUTPATIENT)
Dept: LAB | Facility: HOSPITAL | Age: 77
End: 2017-07-21
Attending: INTERNAL MEDICINE
Payer: MEDICARE

## 2017-07-21 ENCOUNTER — OFFICE VISIT (OUTPATIENT)
Dept: OTOLARYNGOLOGY | Facility: CLINIC | Age: 77
End: 2017-07-21
Payer: MEDICARE

## 2017-07-21 ENCOUNTER — TELEPHONE (OUTPATIENT)
Dept: INTERNAL MEDICINE | Facility: CLINIC | Age: 77
End: 2017-07-21

## 2017-07-21 VITALS
SYSTOLIC BLOOD PRESSURE: 130 MMHG | TEMPERATURE: 98 F | DIASTOLIC BLOOD PRESSURE: 74 MMHG | BODY MASS INDEX: 28.31 KG/M2 | WEIGHT: 159.81 LBS | HEART RATE: 66 BPM

## 2017-07-21 DIAGNOSIS — J38.01 UNILATERAL COMPLETE VOCAL FOLD PARALYSIS: Primary | ICD-10-CM

## 2017-07-21 DIAGNOSIS — E78.5 HYPERLIPIDEMIA, UNSPECIFIED HYPERLIPIDEMIA TYPE: ICD-10-CM

## 2017-07-21 DIAGNOSIS — R49.9 VOICE DISTURBANCE: ICD-10-CM

## 2017-07-21 DIAGNOSIS — I10 ESSENTIAL HYPERTENSION: ICD-10-CM

## 2017-07-21 LAB
ALBUMIN SERPL BCP-MCNC: 3.7 G/DL
ALP SERPL-CCNC: 62 U/L
ALT SERPL W/O P-5'-P-CCNC: 16 U/L
ANION GAP SERPL CALC-SCNC: 5 MMOL/L
AST SERPL-CCNC: 15 U/L
BILIRUB SERPL-MCNC: 0.9 MG/DL
BUN SERPL-MCNC: 19 MG/DL
CALCIUM SERPL-MCNC: 9.4 MG/DL
CHLORIDE SERPL-SCNC: 107 MMOL/L
CHOLEST/HDLC SERPL: 3.9 {RATIO}
CO2 SERPL-SCNC: 29 MMOL/L
CREAT SERPL-MCNC: 0.9 MG/DL
EST. GFR  (AFRICAN AMERICAN): >60 ML/MIN/1.73 M^2
EST. GFR  (NON AFRICAN AMERICAN): >60 ML/MIN/1.73 M^2
GLUCOSE SERPL-MCNC: 103 MG/DL
HDL/CHOLESTEROL RATIO: 25.5 %
HDLC SERPL-MCNC: 235 MG/DL
HDLC SERPL-MCNC: 60 MG/DL
LDLC SERPL CALC-MCNC: 158.4 MG/DL
NONHDLC SERPL-MCNC: 175 MG/DL
POTASSIUM SERPL-SCNC: 5 MMOL/L
PROT SERPL-MCNC: 6.9 G/DL
SODIUM SERPL-SCNC: 141 MMOL/L
TRIGL SERPL-MCNC: 83 MG/DL

## 2017-07-21 PROCEDURE — 1126F AMNT PAIN NOTED NONE PRSNT: CPT | Mod: S$GLB,,, | Performed by: OTOLARYNGOLOGY

## 2017-07-21 PROCEDURE — 99999 PR PBB SHADOW E&M-EST. PATIENT-LVL III: CPT | Mod: PBBFAC,,, | Performed by: OTOLARYNGOLOGY

## 2017-07-21 PROCEDURE — 31579 LARYNGOSCOPY TELESCOPIC: CPT | Mod: S$GLB,,, | Performed by: OTOLARYNGOLOGY

## 2017-07-21 PROCEDURE — 1159F MED LIST DOCD IN RCRD: CPT | Mod: S$GLB,,, | Performed by: OTOLARYNGOLOGY

## 2017-07-21 PROCEDURE — 99213 OFFICE O/P EST LOW 20 MIN: CPT | Mod: 25,S$GLB,, | Performed by: OTOLARYNGOLOGY

## 2017-07-21 NOTE — TELEPHONE ENCOUNTER
----- Message from Rosemarie Brooks MD sent at 7/21/2017 10:05 AM CDT -----  Please review your lab work and we will discuss at your pending office visit.  Rosemarie Perez

## 2017-07-26 ENCOUNTER — OFFICE VISIT (OUTPATIENT)
Dept: INTERNAL MEDICINE | Facility: CLINIC | Age: 77
End: 2017-07-26
Payer: MEDICARE

## 2017-07-26 VITALS
WEIGHT: 162.5 LBS | RESPIRATION RATE: 16 BRPM | TEMPERATURE: 98 F | SYSTOLIC BLOOD PRESSURE: 161 MMHG | DIASTOLIC BLOOD PRESSURE: 93 MMHG | OXYGEN SATURATION: 98 % | HEIGHT: 63 IN | HEART RATE: 72 BPM | BODY MASS INDEX: 28.79 KG/M2

## 2017-07-26 DIAGNOSIS — I70.0 AORTIC ATHEROSCLEROSIS: ICD-10-CM

## 2017-07-26 DIAGNOSIS — E78.5 HYPERLIPIDEMIA, UNSPECIFIED HYPERLIPIDEMIA TYPE: ICD-10-CM

## 2017-07-26 DIAGNOSIS — R92.8 ABNORMAL MAMMOGRAM: ICD-10-CM

## 2017-07-26 DIAGNOSIS — I10 ESSENTIAL HYPERTENSION: Primary | ICD-10-CM

## 2017-07-26 PROCEDURE — 99214 OFFICE O/P EST MOD 30 MIN: CPT | Mod: S$GLB,,, | Performed by: INTERNAL MEDICINE

## 2017-07-26 PROCEDURE — 1126F AMNT PAIN NOTED NONE PRSNT: CPT | Mod: S$GLB,,, | Performed by: INTERNAL MEDICINE

## 2017-07-26 PROCEDURE — 1159F MED LIST DOCD IN RCRD: CPT | Mod: S$GLB,,, | Performed by: INTERNAL MEDICINE

## 2017-07-26 PROCEDURE — 99999 PR PBB SHADOW E&M-EST. PATIENT-LVL III: CPT | Mod: PBBFAC,,, | Performed by: INTERNAL MEDICINE

## 2017-07-26 RX ORDER — ROSUVASTATIN CALCIUM 10 MG/1
10 TABLET, COATED ORAL DAILY
Qty: 90 TABLET | Refills: 3 | Status: SHIPPED | OUTPATIENT
Start: 2017-07-26 | End: 2019-01-14 | Stop reason: SDUPTHER

## 2017-07-26 RX ORDER — ZOLPIDEM TARTRATE 5 MG/1
5 TABLET ORAL NIGHTLY PRN
Qty: 30 TABLET | Refills: 2 | Status: SHIPPED | OUTPATIENT
Start: 2017-07-26 | End: 2018-01-29 | Stop reason: SDUPTHER

## 2017-07-26 NOTE — PROGRESS NOTES
CC: f/up HTN, HLD, aortic atherosclerosis, CKD II    77 y.o. female presents for HTN  Tx : chlorthalidone 25mg ( hasn't taken in 2 weeks)  Denied HA or dizziness  BP today==>163/91    Hoarseness, s/p  Augmentin, consideration for surgery  Now w/ increased voice volume    GERD/tx resumed PPI ~ 6 wks ago  Pt is asx, ENT rec restart PPI @ o/v    HLD/aortic atherosclerosis, tx diet  Denied angina or SOB  LDL==>158.4  CV Risk-31.2%      MEDCARD: Reviewed  ROS:  No fever, chills ,or night sweats  No chest pain or palpitations  No focal deficits or paresthesia  Remainder of review negative except as previously noted    PMHX: Reviewed  SHX: Reviewed  FHX: Reviewed    PE:  VSS:  GEN: WDWN, A&O, NAD, conversant and co-operative; pleasant as always  ++ hoarseness   EYES: Conj/lids unremarkable, sclera anicteric  ENT: Canals w/ some cerumen, right TM - injected , left unremarkable  Nasal mucosa/turbinates, w/o exudate or edema  O/P injected w/o exudate or edema; sinus NT  NECK: Supple w/o LN or TM  RESP: efforts unlabored, lungs CTA  CV: Heart RRR, no MGR, no carotid bruits noted  GI: BS + , soft, NT/ND, - HSM noted  MSK: Gait normal. No CCE  NEURO: BALES. No tremor or facial asymmetry  SKIN: Warm and dry    IMPRESSION:  HTN, elevated- off BP meds x 2 wks  HLD, asx  Aortic atherosclerosis,asx  Abn mammo-requests Rx for future update    PLAN:  Rx rosuvastatin 10mg  Resume BP meds  Resume low salt diet  Resume exercise  Call prn  RTC 3mos

## 2017-08-01 ENCOUNTER — HOSPITAL ENCOUNTER (OUTPATIENT)
Dept: RADIOLOGY | Facility: HOSPITAL | Age: 77
Discharge: HOME | End: 2017-08-01
Attending: INTERNAL MEDICINE
Payer: MEDICARE

## 2017-08-01 VITALS — BODY MASS INDEX: 28.7 KG/M2 | HEIGHT: 63 IN | WEIGHT: 162 LBS

## 2017-08-01 DIAGNOSIS — R92.8 ABNORMAL MAMMOGRAM: ICD-10-CM

## 2017-08-01 PROCEDURE — 77062 BREAST TOMOSYNTHESIS BI: CPT | Mod: 26,,, | Performed by: RADIOLOGY

## 2017-08-01 PROCEDURE — 77066 DX MAMMO INCL CAD BI: CPT | Mod: TC

## 2017-08-01 PROCEDURE — 77066 DX MAMMO INCL CAD BI: CPT | Mod: 26,,, | Performed by: RADIOLOGY

## 2017-08-30 ENCOUNTER — OFFICE VISIT (OUTPATIENT)
Dept: OPHTHALMOLOGY | Facility: CLINIC | Age: 77
End: 2017-08-30
Payer: MEDICARE

## 2017-08-30 DIAGNOSIS — H25.13 NUCLEAR SCLEROSIS, BILATERAL: Primary | ICD-10-CM

## 2017-08-30 PROCEDURE — 99999 PR PBB SHADOW E&M-EST. PATIENT-LVL II: CPT | Mod: PBBFAC,,, | Performed by: OPHTHALMOLOGY

## 2017-08-30 PROCEDURE — 92014 COMPRE OPH EXAM EST PT 1/>: CPT | Mod: S$GLB,,, | Performed by: OPHTHALMOLOGY

## 2017-08-30 NOTE — PROGRESS NOTES
"HPI     Cataract    Additional comments: Both Eyes.            Comments   DLS 08/17/2016 Dr Lopez    78 y/o female presents for cataract recheck.  States vision doesn't seem   to be as good as it was last year.  Using various "generic" OTC glasses as   needed.  Thinks that may be the cause.  Occasional burning OU, relieved   with AT's.  States she gets it mainly when she hasn't slept well.        Last edited by Amada Gamez on 8/30/2017 11:26 AM. (History)            Assessment /Plan     For exam results, see Encounter Report.    Nuclear sclerosis, bilateral      Incipient cataract: Patient does reports mild visual decline, but not sufficient to affect activities of daily living. I recommend monitoring visual status and follow up when visual symptoms worsen.                   "

## 2017-09-29 RX ORDER — CHLORTHALIDONE 25 MG/1
25 TABLET ORAL DAILY
Qty: 90 TABLET | Refills: 1 | Status: SHIPPED | OUTPATIENT
Start: 2017-09-29 | End: 2018-11-13 | Stop reason: SDUPTHER

## 2017-10-11 ENCOUNTER — TELEPHONE (OUTPATIENT)
Dept: ORTHOPEDICS | Facility: CLINIC | Age: 77
End: 2017-10-11

## 2017-10-11 ENCOUNTER — TELEPHONE (OUTPATIENT)
Dept: INTERNAL MEDICINE | Facility: CLINIC | Age: 77
End: 2017-10-11

## 2017-10-11 DIAGNOSIS — E78.5 HYPERLIPIDEMIA, UNSPECIFIED HYPERLIPIDEMIA TYPE: Primary | ICD-10-CM

## 2017-10-11 DIAGNOSIS — I10 BENIGN HYPERTENSION: ICD-10-CM

## 2017-10-11 NOTE — TELEPHONE ENCOUNTER
----- Message from Beatriz Son sent at 10/11/2017 11:36 AM CDT -----  Doctor appointment and lab have been scheduled.  Please link lab orders to the lab appointment.  Date of doctor appointment:  10/27  Physical or EP:  Physical   Date of lab appointment:  10/20  Comments:

## 2017-10-11 NOTE — TELEPHONE ENCOUNTER
Ortho Telephone Triage Message [phone]  Spoke with pt's , Jayant Hopper, and advised that appt scheduled tomorrow with FAVIOLA Norris PA-C is only appt, presently, available in [facility]. Noted pt has been seen for left low back pain and suggested pt may consider being  seen in [facility], for more convenient appt time, if left hip pain is similar.  states that pt is away at this time  and that she will keep tomorrow's Ortho appt.

## 2017-10-11 NOTE — TELEPHONE ENCOUNTER
----- Message from Andrea Luis sent at 10/11/2017  9:03 AM CDT -----  Contact: Jayant () [phone]  Pt's , Jayant, called in requesting an appt for pt's lt hip pain. Pt's current pain level is a 6/10 and began about 1 week ago. I was able to schedule pt for tomorrow (10/12 at 7:15 am with Lanie Norris) which Jayant agreed was okay, but Jayant would rather an appt either today or late morning. Jayant can be reached at [phone].

## 2017-10-12 ENCOUNTER — HOSPITAL ENCOUNTER (OUTPATIENT)
Dept: RADIOLOGY | Facility: HOSPITAL | Age: 77
Discharge: HOME | End: 2017-10-12
Attending: PHYSICIAN ASSISTANT
Payer: MEDICARE

## 2017-10-12 ENCOUNTER — OFFICE VISIT (OUTPATIENT)
Dept: ORTHOPEDICS | Facility: CLINIC | Age: 77
End: 2017-10-12
Payer: MEDICARE

## 2017-10-12 DIAGNOSIS — M25.552 LEFT HIP PAIN: ICD-10-CM

## 2017-10-12 DIAGNOSIS — M53.3 CHRONIC LEFT SI JOINT PAIN: ICD-10-CM

## 2017-10-12 DIAGNOSIS — G89.29 CHRONIC LEFT SI JOINT PAIN: ICD-10-CM

## 2017-10-12 DIAGNOSIS — G89.29 CHRONIC LEFT SI JOINT PAIN: Primary | ICD-10-CM

## 2017-10-12 DIAGNOSIS — M53.3 CHRONIC LEFT SI JOINT PAIN: Primary | ICD-10-CM

## 2017-10-12 PROCEDURE — 99999 PR PBB SHADOW E&M-EST. PATIENT-LVL II: CPT | Mod: PBBFAC,,, | Performed by: PHYSICIAN ASSISTANT

## 2017-10-12 PROCEDURE — 99213 OFFICE O/P EST LOW 20 MIN: CPT | Mod: S$GLB,,, | Performed by: PHYSICIAN ASSISTANT

## 2017-10-12 PROCEDURE — 72120 X-RAY BEND ONLY L-S SPINE: CPT | Mod: 26,,, | Performed by: RADIOLOGY

## 2017-10-12 PROCEDURE — 72100 X-RAY EXAM L-S SPINE 2/3 VWS: CPT | Mod: 26,,, | Performed by: RADIOLOGY

## 2017-10-12 PROCEDURE — 72100 X-RAY EXAM L-S SPINE 2/3 VWS: CPT | Mod: TC

## 2017-10-12 PROCEDURE — 99499 UNLISTED E&M SERVICE: CPT | Mod: S$GLB,,, | Performed by: PHYSICIAN ASSISTANT

## 2017-10-12 RX ORDER — MELOXICAM 15 MG/1
15 TABLET ORAL DAILY
Qty: 15 TABLET | Refills: 0 | Status: SHIPPED | OUTPATIENT
Start: 2017-10-12 | End: 2017-11-02 | Stop reason: SDUPTHER

## 2017-10-12 NOTE — PROGRESS NOTES
Subjective:      Patient ID: Zack Hopper is a 77 y.o. female.    Chief Complaint: No chief complaint on file.    HPI    Patient is a 77 year old female who presents to clinic with chief complaint of a-traumatic constant left hip pain. Pain is located in her SI joint and lower back area. Pain in increased at night and with increased activity. Pain will radiate at times down her leg to posterior calf.  She is taking Advil with mild relief. Exercises also helps some. Massage increased pain.  Patient denied groin pain, trauma, fever chills.     Review of Systems   Constitution: Negative for chills and fever.   Cardiovascular: Negative for chest pain.   Respiratory: Negative for cough and shortness of breath.    Skin: Negative for color change, dry skin, itching, nail changes, poor wound healing and rash.   Musculoskeletal:        Left hip pain.    Neurological: Negative for dizziness.   Psychiatric/Behavioral: Negative for altered mental status. The patient is not nervous/anxious.    All other systems reviewed and are negative.        Objective:            General    Constitutional: She is oriented to person, place, and time. She appears well-developed and well-nourished. No distress.   HENT:   Head: Atraumatic.   Eyes: Conjunctivae are normal.   Cardiovascular: Normal rate.    Pulmonary/Chest: Effort normal.   Neurological: She is alert and oriented to person, place, and time.   Psychiatric: She has a normal mood and affect. Her behavior is normal.         Left Hip Exam     Tenderness   The patient tender to palpation of the SI joint.    Range of Motion   The patient has normal left hip ROM.    Muscle Strength   The patient has normal left hip strength.     Tests   Log Roll: negative    Other   Sensation: normal          RADS:  Advanced multilevel degenerative changes are identified as discussed in detail above, with the disc narrowing and vertebral endplate spurring most pronounced at L1-2 and L5-S1, and with  anterolisthesis seen at the L4-5 level on the basis of underlying facet arthropathy.  Although the findings are similar to the exam of 8/16/12, there has been some progression of this degenerative change since that time, particularly at the L1-2 level, which exhibits considerably greater disc narrowing on the current exam.  No compression fracture.  No translational instability      Assessment:       Encounter Diagnosis   Name Primary?    Chronic left SI joint pain Yes          Plan:       Discussed treatment options with patient including rest ice, oral medication, PT, injection. At this time patient would like to :  - take Mobic x 2 weeks,   - she is to return to clinic as needed. If pain not relieved,

## 2017-10-20 ENCOUNTER — LAB VISIT (OUTPATIENT)
Dept: LAB | Facility: HOSPITAL | Age: 77
End: 2017-10-20
Payer: MEDICARE

## 2017-10-20 ENCOUNTER — OFFICE VISIT (OUTPATIENT)
Dept: OTOLARYNGOLOGY | Facility: CLINIC | Age: 77
End: 2017-10-20
Payer: MEDICARE

## 2017-10-20 VITALS
DIASTOLIC BLOOD PRESSURE: 85 MMHG | SYSTOLIC BLOOD PRESSURE: 149 MMHG | WEIGHT: 165.56 LBS | HEART RATE: 81 BPM | TEMPERATURE: 97 F | BODY MASS INDEX: 29.33 KG/M2

## 2017-10-20 DIAGNOSIS — I10 BENIGN HYPERTENSION: ICD-10-CM

## 2017-10-20 DIAGNOSIS — J38.01 UNILATERAL PARTIAL VOCAL FOLD PARALYSIS: Primary | ICD-10-CM

## 2017-10-20 DIAGNOSIS — E78.5 HYPERLIPIDEMIA, UNSPECIFIED HYPERLIPIDEMIA TYPE: ICD-10-CM

## 2017-10-20 DIAGNOSIS — J04.0 LARYNGITIS: ICD-10-CM

## 2017-10-20 DIAGNOSIS — R49.9 VOICE DISTURBANCE: ICD-10-CM

## 2017-10-20 LAB
ALBUMIN SERPL BCP-MCNC: 3.8 G/DL
ALP SERPL-CCNC: 77 U/L
ALT SERPL W/O P-5'-P-CCNC: 39 U/L
ANION GAP SERPL CALC-SCNC: 10 MMOL/L
AST SERPL-CCNC: 30 U/L
BASOPHILS # BLD AUTO: 0.04 K/UL
BASOPHILS NFR BLD: 0.8 %
BILIRUB SERPL-MCNC: 1 MG/DL
BUN SERPL-MCNC: 21 MG/DL
CALCIUM SERPL-MCNC: 10 MG/DL
CHLORIDE SERPL-SCNC: 104 MMOL/L
CHOLEST SERPL-MCNC: 167 MG/DL
CHOLEST/HDLC SERPL: 3 {RATIO}
CO2 SERPL-SCNC: 27 MMOL/L
CREAT SERPL-MCNC: 1.1 MG/DL
DIFFERENTIAL METHOD: NORMAL
EOSINOPHIL # BLD AUTO: 0.2 K/UL
EOSINOPHIL NFR BLD: 4.6 %
ERYTHROCYTE [DISTWIDTH] IN BLOOD BY AUTOMATED COUNT: 12.7 %
EST. GFR  (AFRICAN AMERICAN): 56.8 ML/MIN/1.73 M^2
EST. GFR  (NON AFRICAN AMERICAN): 49.2 ML/MIN/1.73 M^2
GLUCOSE SERPL-MCNC: 112 MG/DL
HCT VFR BLD AUTO: 39.6 %
HDLC SERPL-MCNC: 55 MG/DL
HDLC SERPL: 32.9 %
HGB BLD-MCNC: 13.2 G/DL
IMM GRANULOCYTES # BLD AUTO: 0.02 K/UL
IMM GRANULOCYTES NFR BLD AUTO: 0.4 %
LDLC SERPL CALC-MCNC: 91 MG/DL
LYMPHOCYTES # BLD AUTO: 1.3 K/UL
LYMPHOCYTES NFR BLD: 25.3 %
MCH RBC QN AUTO: 30.7 PG
MCHC RBC AUTO-ENTMCNC: 33.3 G/DL
MCV RBC AUTO: 92 FL
MONOCYTES # BLD AUTO: 0.6 K/UL
MONOCYTES NFR BLD: 11.4 %
NEUTROPHILS # BLD AUTO: 2.9 K/UL
NEUTROPHILS NFR BLD: 57.5 %
NONHDLC SERPL-MCNC: 112 MG/DL
NRBC BLD-RTO: 0 /100 WBC
PLATELET # BLD AUTO: 165 K/UL
PMV BLD AUTO: 10.4 FL
POTASSIUM SERPL-SCNC: 5 MMOL/L
PROT SERPL-MCNC: 7.7 G/DL
RBC # BLD AUTO: 4.3 M/UL
SODIUM SERPL-SCNC: 141 MMOL/L
TRIGL SERPL-MCNC: 105 MG/DL
TSH SERPL DL<=0.005 MIU/L-ACNC: 2.65 UIU/ML
WBC # BLD AUTO: 5.01 K/UL

## 2017-10-20 PROCEDURE — 80053 COMPREHEN METABOLIC PANEL: CPT

## 2017-10-20 PROCEDURE — 36415 COLL VENOUS BLD VENIPUNCTURE: CPT

## 2017-10-20 PROCEDURE — 99213 OFFICE O/P EST LOW 20 MIN: CPT | Mod: 25,S$GLB,, | Performed by: OTOLARYNGOLOGY

## 2017-10-20 PROCEDURE — 84443 ASSAY THYROID STIM HORMONE: CPT

## 2017-10-20 PROCEDURE — 85025 COMPLETE CBC W/AUTO DIFF WBC: CPT

## 2017-10-20 PROCEDURE — 31579 LARYNGOSCOPY TELESCOPIC: CPT | Mod: S$GLB,,, | Performed by: OTOLARYNGOLOGY

## 2017-10-20 PROCEDURE — 80061 LIPID PANEL: CPT

## 2017-10-20 PROCEDURE — 99999 PR PBB SHADOW E&M-EST. PATIENT-LVL III: CPT | Mod: PBBFAC,,, | Performed by: OTOLARYNGOLOGY

## 2017-10-20 NOTE — PROGRESS NOTES
[facility]  Department of Otorhinolaryngology and Communication Sciences    Subjective:      Zack Hopper is a 77 y.o. female who presents for follow-up. She has idiopathic left vocal fold immobility, potentially recoverable, onset of symptoms January 2017.    She underwent injection augmentation as below. She did well with the injection. Since her last visit, she feels her voice has remained stable and is essentially back to normal. At this time, she only notes mild strain and loss of strength in environments of excess background noise. She has been sick with a URI for the last 24-38 hours and has actually noted some slight improvement inher vocal quality.    TREATMENT HISTORY:  6/9/2017: left vocal fold injection augmentation - Restylane-L    Voice Handicap Index - 10 (VHI-10) score is 0 (down from 33).    The patient's medications, allergies, past medical, surgical, social and family histories were reviewed and updated as appropriate.    A detailed review of systems was obtained with pertinent positives as per the above HPI, and otherwise negative.      Objective:     There were no vitals taken for this visit.     Constitutional: comfortable, well dressed  Psychiatric: appropriate affect  Respiratory: comfortably breathing, symmetric chest rise, no stridor  Voice: normal for age and gender  Head: normocephalic  Eyes: conjunctivae and sclerae clear  Indirect laryngoscopy is limited due to gag    Procedure  Rigid Laryngeal Videostroboscopy (28154): Laryngeal videostroboscopy is indicated to assess the laryngeal vibratory biomechanics and vocal fold oscillation, which cannot be assessed with a plain light examination. This was carried out with a 70 degree endoscope. After verbal consent was obtained, the patient was positioned and the tongue was gently secured with a gauze sponge. The endoscope was passed transorally and positioned to image the larynx and hypopharynx in detail. The following  featured were examined: laryngeal and hypopharyngeal masses; vocal fold range and symmetry of motion; laryngeal mucosal edema, erythema, inflammation, and hydration; salivary pooling; and gross laryngeal sensation. During phonation, the vocal folds were assesses for glottal closure; mucosal wave; vocal fold lesions; vibratory periodicity, amplitude, and phase symmetry; and vertical height match. The equipment was removed. The patient tolerated the procedure well without complication. All findings were normal except:  - left vocal vocal foreshortened, with mild moition impairment; limited and sluggish adduction/abduction  - mild, diffuse, bilateral vocal fold edema and hypervascularity  - mild exudate along the infraglottic vocal folds      Assessment:     Zack Hopper is a 77 y.o. female with idiopathic left vocal fold motion impairment, onset of symptoms January 2017.     Her exam shows near-complete recovery of function. She incidentally has some signs of mild laryngitis, presumably viral in etiology.     Plan:     Reassurance was provided. I recommended symptomatic treatment, rest, and hydration regarding her URI. Should she fail to improve in 7-10 days, I recommended re-evaluation by her PCP.    She will follow up with me in 3 months, or sooner if needed.    All questions were answered, and the patient is in agreement with the plan.    Yomi Horowitz M.D.  [facility]  Department of Otorhinolaryngology and Communication Sciences

## 2017-10-23 ENCOUNTER — TELEPHONE (OUTPATIENT)
Dept: INTERNAL MEDICINE | Facility: CLINIC | Age: 77
End: 2017-10-23

## 2017-10-23 NOTE — TELEPHONE ENCOUNTER
----- Message from Rosemarie Brooks MD sent at 10/22/2017  7:16 PM CDT -----  Please review your lab work and we will discuss at your pending office visit.  Rosemarie Perez

## 2017-10-26 NOTE — PROGRESS NOTES
CC: Annual PE    77 y.o. female presents for PE  Non smoker  Social ETOH    HEALTH MAINTENANCE:  Cholesterol/labs: reviewed  C-scope: 2015-----------REC 5 yrs  One 3 mm polyp in the ascending colon. Resected   and retrieved. Tubular adenoma  - Melanosis in the colon.  - The examination was otherwise normal on direct   and retroflexion views.  EGD: 2015  Impression: - LA Grade B reflux esophagitis.  - Small hiatus hernia.  - Non-bleeding erosive gastropathy. Biopsied.  - Normal examined duodenum.  WWE: aged out, asx  Mammo: 2017, s/p bx- benign  DEXA: 2015, normal  EYE exam: UTD  DDS exam: UTD    VACCINATIONS:  TD: 2009  Flu: yearly  Pneumovax: 2014  Prevnar: 10/2015  Zostavax: 3/21/2011    MEDCARD: Reviewed  ROS:  No fever, chills, or night sweats  No visual disturbance or d/c  No ear or sinus pain or pressure  No dysphagia or early satiety  No chest pain or palpitations  No cough or wheezing  No PND or orthopnea  No GERD or abdominal pain  No change in bowels or blood in stool  No hematuria or dysuria;   No vaginal bleeding or pelvic pain or bloating  No skin rashes or lesions  No joint swelling or erythema  No unusual HA or focal deficits  No cold or heat intolerance  No increased thirst or urination  No unusual bruising or bleeding  ADL's: 100% independent  Memory: Good, delayed recall  Mental health: stress- has improved w/ time   w/ recent health scare and sister's breast cancer, improved w/ benign breast bx, sister  and pt has resumed her exercise that is very helpful  Advance Directives:+ will, need living will, and POA_ forms for AD given  Nutrtion: Good  Gait: No falls  Safety:Intact  Urinary incontinence: n/a  Remainder of review negative except as previously noted    PMHX:Reviewed  PSHX: Reviewed  SHX: Reviewed      PE:  VS: As noted  GENERAL: Well developed well nourished, alert and oriented in NAD  Conversant and co-operative  EYES: Conjunctiva and lids normal, sclera anicteric, PERRL,  EOMI  ENT: Hearing intact; canals free of cerumen ,   TM's unremarkable  Nasal mucosa/turbinates/septum unremarkable;   oropharynx w/o lesions or stridor  Sinus nontender  NECK: Supple w/o thyromegaly or lymphadenopathy  RESPIRATORY: Efforts are unlabored; lungs are CTAP  CARDIOVASCULAR: Heart RRR w/o mumur, gallop or rub; No carotid bruits noted  1+ pedal pulses; no LE edema noted  GASTROINTESTINAL: Bowel sounds are present, soft, nontender, nondistended  No hepatosplenomegaly noted.  MUSCULOSKELETAL: Gait normal. No clubbing, cyanosis, or edema noted.  NEUROLOGIC: BALES. No tremor noted  SKIN: Warm and dry    IMPRESSION:  Annual PE  FHx: Breast cancer  HTN, stable  CKD , III, asx  Hypercholesterolemia,asx   Aortic atherosclerosis, asx  Ascending aorta- ULN 3.7cm  Insomnia, chronic  GERD, occ  VItamin D defiicency, off supplements - will restart    PLAN:   HD flu vaccine  Repeat imaging of aorta - ULN, one year- 5/2018  Exercise  Avoid salt  Monitor BP  Continue present meds  Rx update  Vitamin D supplementation  Call prn  RTC 2-3 weeks for BP check

## 2017-10-27 ENCOUNTER — OFFICE VISIT (OUTPATIENT)
Dept: INTERNAL MEDICINE | Facility: CLINIC | Age: 77
End: 2017-10-27
Payer: MEDICARE

## 2017-10-27 DIAGNOSIS — E55.9 VITAMIN D DEFICIENCY: ICD-10-CM

## 2017-10-27 DIAGNOSIS — E78.5 HYPERLIPIDEMIA, UNSPECIFIED HYPERLIPIDEMIA TYPE: ICD-10-CM

## 2017-10-27 DIAGNOSIS — G47.00 INSOMNIA, UNSPECIFIED TYPE: ICD-10-CM

## 2017-10-27 DIAGNOSIS — K21.9 GASTROESOPHAGEAL REFLUX DISEASE WITHOUT ESOPHAGITIS: ICD-10-CM

## 2017-10-27 DIAGNOSIS — N18.30 CKD (CHRONIC KIDNEY DISEASE), STAGE III: ICD-10-CM

## 2017-10-27 DIAGNOSIS — I70.0 AORTIC ARCH ATHEROSCLEROSIS: ICD-10-CM

## 2017-10-27 DIAGNOSIS — Z00.00 ANNUAL PHYSICAL EXAM: Primary | ICD-10-CM

## 2017-10-27 DIAGNOSIS — I10 ESSENTIAL HYPERTENSION: ICD-10-CM

## 2017-10-27 PROCEDURE — 99999 PR PBB SHADOW E&M-EST. PATIENT-LVL III: CPT | Mod: PBBFAC,,, | Performed by: INTERNAL MEDICINE

## 2017-10-27 PROCEDURE — G0008 ADMIN INFLUENZA VIRUS VAC: HCPCS | Mod: S$GLB,,, | Performed by: INTERNAL MEDICINE

## 2017-10-27 PROCEDURE — 99499 UNLISTED E&M SERVICE: CPT | Mod: S$GLB,,, | Performed by: INTERNAL MEDICINE

## 2017-10-27 PROCEDURE — 99397 PER PM REEVAL EST PAT 65+ YR: CPT | Mod: S$GLB,,, | Performed by: INTERNAL MEDICINE

## 2017-10-27 PROCEDURE — 90662 IIV NO PRSV INCREASED AG IM: CPT | Mod: S$GLB,,, | Performed by: INTERNAL MEDICINE

## 2017-10-28 VITALS
BODY MASS INDEX: 29.38 KG/M2 | TEMPERATURE: 99 F | HEIGHT: 63 IN | OXYGEN SATURATION: 99 % | DIASTOLIC BLOOD PRESSURE: 66 MMHG | RESPIRATION RATE: 16 BRPM | HEART RATE: 76 BPM | WEIGHT: 165.81 LBS | SYSTOLIC BLOOD PRESSURE: 138 MMHG

## 2017-10-30 ENCOUNTER — TELEPHONE (OUTPATIENT)
Dept: ORTHOPEDICS | Facility: CLINIC | Age: 77
End: 2017-10-30

## 2017-10-30 NOTE — TELEPHONE ENCOUNTER
----- Message from Britton May sent at 10/30/2017 11:22 AM CDT -----  Contact: self  Pt called to see if there is any way she be seen sometime this week for severe rt hip pain. Pt states she would like to receive the injection that was discussed at her last visit. [phone]

## 2017-10-31 ENCOUNTER — TELEPHONE (OUTPATIENT)
Dept: ORTHOPEDICS | Facility: CLINIC | Age: 77
End: 2017-10-31

## 2017-10-31 NOTE — TELEPHONE ENCOUNTER
Spoke with patient's . Informed him that a message was sent to Amisha Brian to have patient scheduled for SI joint injection. He verbalized understanding and will follow up with Catheter lab.

## 2017-10-31 NOTE — TELEPHONE ENCOUNTER
----- Message from Edwin Swain sent at 10/31/2017  9:59 AM CDT -----  Contact: Mr. Munoz/  Mr. Munoz would like to speak with you regarding the status of the pts back inj. Mr. Munoz can be reached at [phone].

## 2017-11-01 ENCOUNTER — TELEPHONE (OUTPATIENT)
Dept: PAIN MEDICINE | Facility: CLINIC | Age: 77
End: 2017-11-01

## 2017-11-01 NOTE — TELEPHONE ENCOUNTER
Left message for pt's  to call.       ----- Message from Katty Cantu LPN sent at 11/1/2017 10:01 AM CDT -----   states [facility] staff sent an order to Dr. WITT to get an injection. He is looking to see when pt can get scheduled.     Thanks, Katty

## 2017-11-01 NOTE — TELEPHONE ENCOUNTER
"----- Message from Zack Prescott sent at 11/1/2017 12:58 PM CDT -----  Contact:  Jayant  [phone]  "I am waiting to hear from Amisha in ref to my wife"  "

## 2017-11-01 NOTE — TELEPHONE ENCOUNTER
"----- Message from Zack Prescott sent at 11/1/2017  9:08 AM CDT -----  Contact:  Jayant   "I need an appointment scheduled for my wife for an injection. Orthopedic states that they sent the orders to Dr Leal office. My wife is in a lot of pain.  I need the appointment soon.   I am leaving early Friday to go out of town.  Please call me back."  "

## 2017-11-02 ENCOUNTER — TELEPHONE (OUTPATIENT)
Dept: PAIN MEDICINE | Facility: CLINIC | Age: 77
End: 2017-11-02

## 2017-11-02 ENCOUNTER — TELEPHONE (OUTPATIENT)
Dept: ORTHOPEDICS | Facility: CLINIC | Age: 77
End: 2017-11-02

## 2017-11-02 RX ORDER — MELOXICAM 15 MG/1
15 TABLET ORAL DAILY
Qty: 30 TABLET | Refills: 3 | Status: SHIPPED | OUTPATIENT
Start: 2017-11-02 | End: 2017-12-02

## 2017-11-02 NOTE — TELEPHONE ENCOUNTER
----- Message from Ema Jay sent at 11/2/2017  9:48 AM CDT -----  Contact:    Pt's  stated that pt has not been able to get appt for her injection until after she comes back from out of town. Pt is leaving tomorrow for 3 weeks and is requesting a refill for meloxicam (MOBIC) 15 MG tablet. Please refill rx today because pt is leaving early tomorrow morning. Please call pt's  when this has been addressed. [phone]

## 2017-11-02 NOTE — TELEPHONE ENCOUNTER
Patient states Dr Norris sent orders for steroid injection and is requesting to schedule please advise

## 2017-11-02 NOTE — TELEPHONE ENCOUNTER
"----- Message from Zack Prescott sent at 11/1/2017 10:19 AM CDT -----  Contact:    [phone]  "Please call me in reference to scheduling an injection"  "

## 2017-11-20 NOTE — PROGRESS NOTES
CC: f/up HTN,  CKD II, and LBP w/ radiation into the LLE    77 y.o. female presents for HTN  Tx : chlorthalidone 25mg   Denied HA or dizziness  BP today==>122/60    LBP w/ radiation into the LLE  Tx: chiropractic care in [place], Tylenol  Pain 8-9/10, woke pt from sleep  Xrays- 10/12/2017   Advanced multilevel degenerative changes are identified as discussed in detail above, with the disc narrowing and vertebral endplate spurring most pronounced at L1-2 and L5-S1, and with anterolisthesis seen at the L4-5 level on the basis of underlying facet arthropathy.  Although the findings are similar to the exam of 8/16/12, there has been some progression of this degenerative change since that time, particularly at the L1-2 level, which exhibits considerably greater disc narrowing on the current exam.  No compression fracture.  No translational instability.  Interested in CORY    MEDCARD: Reviewed  ROS:  No fever, chills ,or night sweats  No chest pain or palpitations  No focal deficits or paresthesia  Remainder of review negative except as previously noted    PMHX: Reviewed  SHX: Reviewed  FHX: Reviewed    PE:  VSS:  GEN: WDWN, A&O, NAD, conversant and co-operative; pleasant as always    EYES: Conj/lids unremarkable, sclera anicteric  ENT: Canals w/ some cerumen, right TM - injected , left unremarkable  Nasal mucosa/turbinates, w/o exudate or edema  O/P injected w/o exudate or edema; sinus NT  NECK: Supple w/o LN or TM  RESP: efforts unlabored, lungs CTA  CV: Heart RRR, no MGR, no carotid bruits noted  MSK: Gait normal. No CCE  SPine:NT, + left upper buttock , neg SLR  NEURO: BALES. No tremor or facial asymmetry  SKIN: Warm and dry    IMPRESSION:  HTN, elevated- off BP meds x 2 wks  LBP w/ left radicular sx    PLAN:  MRI L-spine  Resume BP meds  Resume low salt diet  Resume exercise  Call prn  RTC 3mos

## 2017-11-22 ENCOUNTER — OFFICE VISIT (OUTPATIENT)
Dept: INTERNAL MEDICINE | Facility: CLINIC | Age: 77
End: 2017-11-22
Payer: MEDICARE

## 2017-11-22 VITALS
TEMPERATURE: 98 F | HEIGHT: 63 IN | SYSTOLIC BLOOD PRESSURE: 122 MMHG | HEART RATE: 60 BPM | RESPIRATION RATE: 14 BRPM | BODY MASS INDEX: 29.1 KG/M2 | DIASTOLIC BLOOD PRESSURE: 60 MMHG | WEIGHT: 164.25 LBS

## 2017-11-22 DIAGNOSIS — M79.605 LOW BACK PAIN RADIATING TO LEFT LOWER EXTREMITY: ICD-10-CM

## 2017-11-22 DIAGNOSIS — M54.50 LOW BACK PAIN RADIATING TO LEFT LOWER EXTREMITY: ICD-10-CM

## 2017-11-22 DIAGNOSIS — I10 HYPERTENSION, UNSPECIFIED TYPE: Primary | ICD-10-CM

## 2017-11-22 PROCEDURE — 99214 OFFICE O/P EST MOD 30 MIN: CPT | Mod: S$GLB,,, | Performed by: INTERNAL MEDICINE

## 2017-11-22 PROCEDURE — 99999 PR PBB SHADOW E&M-EST. PATIENT-LVL III: CPT | Mod: PBBFAC,,, | Performed by: INTERNAL MEDICINE

## 2017-11-22 PROCEDURE — 99499 UNLISTED E&M SERVICE: CPT | Mod: S$GLB,,, | Performed by: INTERNAL MEDICINE

## 2017-11-30 ENCOUNTER — HOSPITAL ENCOUNTER (OUTPATIENT)
Dept: RADIOLOGY | Facility: HOSPITAL | Age: 77
Discharge: HOME | End: 2017-11-30
Attending: INTERNAL MEDICINE
Payer: MEDICARE

## 2017-11-30 DIAGNOSIS — M54.50 LOW BACK PAIN RADIATING TO LEFT LOWER EXTREMITY: ICD-10-CM

## 2017-11-30 DIAGNOSIS — M79.605 LOW BACK PAIN RADIATING TO LEFT LOWER EXTREMITY: ICD-10-CM

## 2017-11-30 PROCEDURE — 72148 MRI LUMBAR SPINE W/O DYE: CPT | Mod: TC

## 2017-11-30 PROCEDURE — 72148 MRI LUMBAR SPINE W/O DYE: CPT | Mod: 26,,, | Performed by: RADIOLOGY

## 2017-12-01 ENCOUNTER — TELEPHONE (OUTPATIENT)
Dept: INTERNAL MEDICINE | Facility: CLINIC | Age: 77
End: 2017-12-01

## 2017-12-01 DIAGNOSIS — M48.00 SPINAL STENOSIS, UNSPECIFIED SPINAL REGION: Primary | ICD-10-CM

## 2017-12-01 NOTE — TELEPHONE ENCOUNTER
Please advise pt that her MRI revealed arthritis  And spinal stenosis     rec PM  Consult order placed

## 2017-12-04 ENCOUNTER — TELEPHONE (OUTPATIENT)
Dept: INTERNAL MEDICINE | Facility: CLINIC | Age: 77
End: 2017-12-04

## 2017-12-04 NOTE — TELEPHONE ENCOUNTER
----- Message from Jenny Willoughby sent at 12/1/2017  4:32 PM CST -----  Contact: Self [phone]  Patient is returning a phone call.  Who left a message for the patient: Emeli Rocha patient know what this is regarding:    Comments:

## 2017-12-04 NOTE — TELEPHONE ENCOUNTER
----- Message from Rosemarie Brooks MD sent at 12/3/2017  9:12 PM CST -----  Please note that your MRI revealed arthritis and spinal stenosis  Please call to review treatment options  Rosemarie Perez

## 2017-12-06 NOTE — PROGRESS NOTES
[facility] New Patient Evaluation    Referred by: Dr. Rosemarie Hastings  Reason for referral: Left low back pain    CC:   Chief Complaint   Patient presents with    Low-back Pain     left side    Rectal Pain     left side       HPI: Zack Hopper is a pleasant 77 y.o. female who presented to my clinic complaining of lower back pain as characterized below.    Location: lower back   Severity: Currently: 7/10   Typical Range: 5-8/10     Exacerbation: 9/10   Onset: a few months ago  Quality: Aching  Radiation: Right posterior thigh  Axial/Extremity Percentage of Pain: 90/10  Exacerbating Factors: standing for more than 5 minutes and walking for more than 5 minutes  Mitigating Factors: ice and medications  Assoc: denies night fever/night sweats, urinary incontinence, bowel incontinence, significant weight loss, significant motor weakness and loss of sensations    Previous Therapies:  PT: Denies  HEP: Endorses daily walks  TENS:  Injections: Denies  Surgery: Denies  Medications:    - NSAIDS: Yes   - MSK Relaxants:    - TCAs:    - SNRIs:    - Topicals:     Current Pain Medications:  1. Tylenol     Full Medication List:    Current Outpatient Prescriptions:     acetaminophen (TYLENOL) 500 MG tablet, Take 500 mg by mouth every 6 (six) hours as needed for Pain., Disp: , Rfl:     chlorthalidone (HYGROTEN) 25 MG Tab, Take 1 tablet (25 mg total) by mouth once daily., Disp: 90 tablet, Rfl: 1    ibuprofen (ADVIL) 200 MG tablet, Take 200 mg by mouth as needed. 1 Tablet Oral .  Last taken 12/14/11, Disp: , Rfl:     meloxicam (MOBIC) 15 MG tablet, Take 15 mg by mouth once daily., Disp: , Rfl: 0    omeprazole (PRILOSEC) 20 MG capsule, Take 20 mg by mouth once daily., Disp: , Rfl: 0    rosuvastatin (CRESTOR) 10 MG tablet, Take 1 tablet (10 mg total) by mouth once daily., Disp: 90 tablet, Rfl: 3    zolpidem (AMBIEN) 5 MG Tab, Take 1 tablet (5 mg total) by mouth nightly as needed., Disp: 30 tablet, Rfl: 2  "    Review of Systems:  Review of Systems   Constitutional: Negative for chills and fever.   HENT: Negative for nosebleeds.    Eyes: Negative for pain.   Respiratory: Negative for hemoptysis.    Cardiovascular: Negative for chest pain.   Gastrointestinal: Negative for nausea and vomiting.   Genitourinary: Negative for dysuria.   Musculoskeletal: Positive for back pain.   Skin: Negative for rash.   Neurological: Negative for tremors.   Endo/Heme/Allergies: Does not bruise/bleed easily.   Psychiatric/Behavioral: Negative for suicidal ideas.       Allergies:  Sulfacetamide     Medical History:  Past Medical History:   Diagnosis Date    DJD (degenerative joint disease)     Dyslipidemia     Hypertension     Nuclear sclerosis - Both Eyes 2012    Rosacea     Vitamin D deficiency disease         Surgical History:  Past Surgical History:   Procedure Laterality Date    APPENDECTOMY      BREAST BIOPSY Left     2015    BREAST BIOPSY Right     10/2015 ex bx- papilloma     breast biopsy, left      10/2015     SECTION, CLASSIC      Three times    CHOLECYSTECTOMY      SHOULDER OPEN ROTATOR CUFF REPAIR Right     TOTAL KNEE ARTHROPLASTY      right knee        Social History:  Social History     Social History    Marital status:      Spouse name: N/A    Number of children: N/A    Years of education: N/A     Occupational History    Not on file.     Social History Main Topics    Smoking status: Never Smoker    Smokeless tobacco: Never Used    Alcohol use 0.6 oz/week     1 Glasses of wine per week      Comment: occ    Drug use: No    Sexual activity: Not on file     Other Topics Concern    Not on file     Social History Narrative    No narrative on file       Physical Exam:  Vitals:    17 1545   BP: (!) 157/88   Pulse: 87   Resp: 18   Weight: 76.2 kg (168 lb 1.6 oz)   Height: 5' 3" (1.6 m)   PainSc:   8     General    Nursing note and vitals reviewed.  Constitutional: She is oriented " to person, place, and time. She appears well-developed and well-nourished. No distress.   HENT:   Head: Normocephalic and atraumatic.   Nose: Nose normal.   Eyes: Conjunctivae and EOM are normal. Pupils are equal, round, and reactive to light. Right eye exhibits no discharge. Left eye exhibits no discharge. No scleral icterus.   Neck: No JVD present.   Cardiovascular: Intact distal pulses.    Pulmonary/Chest: Effort normal. No respiratory distress.   Abdominal: She exhibits no distension.   Neurological: She is alert and oriented to person, place, and time. Coordination normal.   Psychiatric: She has a normal mood and affect. Her behavior is normal. Judgment and thought content normal.     General Musculoskeletal Exam   Gait: antalgic         Right Hip Exam     Tests   Pain w/ forced internal rotation (NAKITA): absent  Left Hip Exam     Tests   Pain w/ forced internal rotation (NAKITA): present      Back (L-Spine & T-Spine) / Neck (C-Spine) Exam     Tenderness Left paramedian tenderness of the Sacrum.     Back (L-Spine & T-Spine) Range of Motion   Extension: abnormal Back extension: facet loading positive on left.         Imaging:MRI Lumbar Spine Without Contrast   [id number] 11/30/17  05:58:48 [id number] ([facility]) : MRI LUMBAR SPINE WITHOUT CONTRAST    SUPPLIED CLINICAL HISTORY:  Low back pain    TECHNIQUE:  Multiplanar, multisequence images of the lumbar spine were obtained without the use of intravenous contrast.      COMPARISON: Lumbar spine radiograph 10/12/2017     FINDINGS:    There is grade 1 anterolisthesis of L4 on L5.  Vertebral body heights are well-maintained without signal abnormality to suggest acute fracture or infiltrative marrow placement process.  Intervertebral discs demonstrate multilevel degeneration with disc space height loss most prominent at L5-S1 and L1-L2.  Conus and lumbar spinal nerve roots are normal in signal.  Spinal cord terminates at the L1 vertebral level.  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    Limited evaluation of the surrounding soft tissues demonstrate no significant abnormalities.      T12-L1:  No focal disc abnormalities.  No significant spinal canal stenosis or neuroforaminal narrowing.    L1-2:  Circumferential disc bulge as well as mild bilateral facet hypertrophy results in mild spinal canal stenosis as well as severe right and moderate left neuroforaminal narrowing.      L2-3:  Circumferential disc bulge as well as mild bilateral facet hypertrophy results in moderate spinal canal stenosis as well as moderate bilateral neuroforaminal narrowing.      L3-4:  Circumferential disc bulge as well as moderate bilateral facet hypertrophy and ligamentum flavum thickening results in mild spinal canal stenosis as well as mild right and moderate left neuroforaminal narrowing      L4-5:  Grade 1 anterolisthesis along with moderate bilateral facet hypertrophy and ligamentum flavum thickening results in moderate spinal canal stenosis as well as mild bilateral neuroforaminal narrowing.      L5-S1:  Mild circumferential disc bulge as well as mild bilateral facet hypertrophy results in moderate right and severe left neuroforaminal narrowing.   Impression          1.  Multilevel spondylosis of the lumbar spine results in moderate spinal canal stenosis at L2-3 and L4-5 as well as severe right neuroforaminal narrowing at L1-2 and severe left neuroforaminal narrowing at L5-S1.    2.  Grade one anterolisthesis of L4 on L5.  ______________________________________     Electronically signed by resident: REFUGIO HERNANDEZ MD  Date: 11/30/17  Time: 10:59    As the supervising and teaching physician, I personally reviewed the images and resident's interpretation and I agree with the findings.    Electronically signed by: Willam Torres MD  Date: 11/30/17  Time: 15:28        X-Ray Lumbar Spine Ap Lateral w/Flex Ext  5 views of the lumbar spine were obtained, with AP, lateral, lumbosacral lateral, and lateral flexion and  extension images all submitted.    Mild scoliosis convex to the right in the lumbar region is seen, as is 7.5 mm of anterolisthesis of L4 in relation to L5, the latter alignment abnormality secondary to L4-5 facet arthropathy.  Alignment at other levels appears unremarkable, and there is no evidence of significant translational instability seen on the weight- bearing flexion/extension images.  Vertebral body heights are normally maintained, without compression deformity at any level.  There is prominent disc narrowing seen at every lumbar level, the most severe degrees of narrowing being at L1-2 and L5-S1, with a vacuum phenomenon indicative of disc desiccation seen at the L5-S1 level.  Marginal vertebral endplate spurring is observed, particularly at L1-2 and L5-S1.  Facet arthropathy is seen at L5-S1, in addition to that previously mentioned at L4-5.  Vertebral endplates are well-defined.  No osseous destructive process.  Surgical clips are seen in the right upper quadrant, and calcification in the wall of the abdominal aorta, without definable aneurysm, is also incidentally observed.   Impression      Advanced multilevel degenerative changes are identified as discussed in detail above, with the disc narrowing and vertebral endplate spurring most pronounced at L1-2 and L5-S1, and with anterolisthesis seen at the L4-5 level on the basis of underlying facet arthropathy.  Although the findings are similar to the exam of 8/16/12, there has been some progression of this degenerative change since that time, particularly at the L1-2 level, which exhibits considerably greater disc narrowing on the current exam.  No compression fracture.  No translational instability.      Electronically signed by: Willam Friedman MD  Date: 10/12/17  Time: 08:58          Labs:  BMP  Lab Results   Component Value Date     10/20/2017    K 5.0 10/20/2017     10/20/2017    CO2 27 10/20/2017    BUN 21 10/20/2017    CREATININE 1.1  10/20/2017    CALCIUM 10.0 10/20/2017    ANIONGAP 10 10/20/2017    ESTGFRAFRICA 56.8 (A) 10/20/2017    EGFRNONAA 49.2 (A) 10/20/2017     Lab Results   Component Value Date    ALT 39 10/20/2017    AST 30 10/20/2017    ALKPHOS 77 10/20/2017    BILITOT 1.0 10/20/2017       Diagnoses & Associated Orders:  Problem List Items Addressed This Visit     Lumbar spondylosis    Myofascial pain syndrome    DDD (degenerative disc disease), lumbosacral    Spinal stenosis of lumbosacral region        78 yo F with axial right lower back pain due to SI dysfunction and facet arthopathy without evidence of radiculopathy at this time.  While severe DDD and NFS may be a component of her pain, exam and HPI is most consistent with SI as the primary pain generator as this time.     Recommendations & Interventions:   Procedures: Start with LEFT SI injection x1.  Consider LEFT L3,4,5 MBB/RFA sequence  Medications: None recommended at this time.  Imaging: No additional at this time  PT/OT: She endorses frequent exercise but may benefit from referral to PT for SI joint.  HEP: Cont walking daily as tolerated  Diet:   Other:   Follow Up: RTC 2-4 weeks p inj    Ricci Lockett Jr, MD  Interventional Pain Medicine / Anesthesiology    Disclaimer: This note was partly generated using dictation software which may occasionally result in transcription errors.

## 2017-12-07 ENCOUNTER — OFFICE VISIT (OUTPATIENT)
Dept: PAIN MEDICINE | Facility: CLINIC | Age: 77
End: 2017-12-07
Payer: MEDICARE

## 2017-12-07 VITALS
DIASTOLIC BLOOD PRESSURE: 88 MMHG | SYSTOLIC BLOOD PRESSURE: 157 MMHG | RESPIRATION RATE: 18 BRPM | HEART RATE: 87 BPM | BODY MASS INDEX: 29.79 KG/M2 | HEIGHT: 63 IN | WEIGHT: 168.13 LBS

## 2017-12-07 DIAGNOSIS — M47.816 LUMBAR SPONDYLOSIS: ICD-10-CM

## 2017-12-07 DIAGNOSIS — M79.18 MYOFASCIAL PAIN SYNDROME: ICD-10-CM

## 2017-12-07 DIAGNOSIS — M51.37 DDD (DEGENERATIVE DISC DISEASE), LUMBOSACRAL: ICD-10-CM

## 2017-12-07 DIAGNOSIS — M48.07 SPINAL STENOSIS OF LUMBOSACRAL REGION: ICD-10-CM

## 2017-12-07 PROBLEM — M51.379 DDD (DEGENERATIVE DISC DISEASE), LUMBOSACRAL: Status: ACTIVE | Noted: 2017-12-07

## 2017-12-07 PROCEDURE — 99499 UNLISTED E&M SERVICE: CPT | Mod: S$GLB,,, | Performed by: PAIN MEDICINE

## 2017-12-07 PROCEDURE — 99999 PR PBB SHADOW E&M-EST. PATIENT-LVL III: CPT | Mod: PBBFAC,,, | Performed by: PAIN MEDICINE

## 2017-12-07 PROCEDURE — 99204 OFFICE O/P NEW MOD 45 MIN: CPT | Mod: S$GLB,,, | Performed by: PAIN MEDICINE

## 2017-12-07 RX ORDER — MELOXICAM 15 MG/1
15 TABLET ORAL DAILY
Refills: 0 | COMMUNITY
Start: 2017-11-02 | End: 2018-04-13

## 2017-12-07 RX ORDER — ACETAMINOPHEN 500 MG
500 TABLET ORAL EVERY 6 HOURS PRN
COMMUNITY
End: 2019-02-13

## 2017-12-12 ENCOUNTER — TELEPHONE (OUTPATIENT)
Dept: PAIN MEDICINE | Facility: HOSPITAL | Age: 77
End: 2017-12-12

## 2017-12-14 ENCOUNTER — HOSPITAL ENCOUNTER (OUTPATIENT)
Facility: HOSPITAL | Age: 77
Discharge: HOME | End: 2017-12-14
Attending: PAIN MEDICINE | Admitting: PAIN MEDICINE
Payer: MEDICARE

## 2017-12-14 ENCOUNTER — SURGERY (OUTPATIENT)
Age: 77
End: 2017-12-14

## 2017-12-14 VITALS
BODY MASS INDEX: 29.23 KG/M2 | SYSTOLIC BLOOD PRESSURE: 155 MMHG | DIASTOLIC BLOOD PRESSURE: 80 MMHG | WEIGHT: 165 LBS | TEMPERATURE: 98 F | HEIGHT: 63 IN | OXYGEN SATURATION: 100 % | HEART RATE: 69 BPM | RESPIRATION RATE: 16 BRPM

## 2017-12-14 DIAGNOSIS — M53.3 SI (SACROILIAC) JOINT DYSFUNCTION: Primary | ICD-10-CM

## 2017-12-14 PROCEDURE — 27096 INJECT SACROILIAC JOINT: CPT | Performed by: PAIN MEDICINE

## 2017-12-14 PROCEDURE — 25000003 PHARM REV CODE 250: Performed by: PAIN MEDICINE

## 2017-12-14 PROCEDURE — 63600175 PHARM REV CODE 636 W HCPCS: Performed by: PAIN MEDICINE

## 2017-12-14 PROCEDURE — 25500020 PHARM REV CODE 255: Performed by: PAIN MEDICINE

## 2017-12-14 PROCEDURE — 27096 INJECT SACROILIAC JOINT: CPT | Mod: LT,,, | Performed by: PAIN MEDICINE

## 2017-12-14 RX ORDER — TRIAMCINOLONE ACETONIDE 40 MG/ML
INJECTION, SUSPENSION INTRA-ARTICULAR; INTRAMUSCULAR
Status: DISCONTINUED | OUTPATIENT
Start: 2017-12-14 | End: 2017-12-14 | Stop reason: HOSPADM

## 2017-12-14 RX ORDER — BUPIVACAINE HYDROCHLORIDE 2.5 MG/ML
INJECTION, SOLUTION EPIDURAL; INFILTRATION; INTRACAUDAL
Status: DISCONTINUED | OUTPATIENT
Start: 2017-12-14 | End: 2017-12-14 | Stop reason: HOSPADM

## 2017-12-14 RX ORDER — LIDOCAINE HYDROCHLORIDE 10 MG/ML
INJECTION INFILTRATION; PERINEURAL
Status: DISCONTINUED | OUTPATIENT
Start: 2017-12-14 | End: 2017-12-14 | Stop reason: HOSPADM

## 2017-12-14 RX ADMIN — LIDOCAINE HYDROCHLORIDE 5 ML: 10 INJECTION, SOLUTION INFILTRATION; PERINEURAL at 08:12

## 2017-12-14 RX ADMIN — BUPIVACAINE HYDROCHLORIDE 5 ML: 2.5 INJECTION, SOLUTION EPIDURAL; INFILTRATION; INTRACAUDAL; PERINEURAL at 08:12

## 2017-12-14 RX ADMIN — IOHEXOL 5 ML: 300 INJECTION, SOLUTION INTRAVENOUS at 08:12

## 2017-12-14 RX ADMIN — TRIAMCINOLONE ACETONIDE 40 MG: 40 INJECTION, SUSPENSION INTRA-ARTICULAR; INTRAMUSCULAR at 08:12

## 2017-12-14 NOTE — H&P (VIEW-ONLY)
[facility] New Patient Evaluation    Referred by: Dr. Rosemarie Hastings  Reason for referral: Left low back pain    CC:   Chief Complaint   Patient presents with    Low-back Pain     left side    Rectal Pain     left side       HPI: Zack Hopper is a pleasant 77 y.o. female who presented to my clinic complaining of lower back pain as characterized below.    Location: lower back   Severity: Currently: 7/10   Typical Range: 5-8/10     Exacerbation: 9/10   Onset: a few months ago  Quality: Aching  Radiation: Right posterior thigh  Axial/Extremity Percentage of Pain: 90/10  Exacerbating Factors: standing for more than 5 minutes and walking for more than 5 minutes  Mitigating Factors: ice and medications  Assoc: denies night fever/night sweats, urinary incontinence, bowel incontinence, significant weight loss, significant motor weakness and loss of sensations    Previous Therapies:  PT: Denies  HEP: Endorses daily walks  TENS:  Injections: Denies  Surgery: Denies  Medications:    - NSAIDS: Yes   - MSK Relaxants:    - TCAs:    - SNRIs:    - Topicals:     Current Pain Medications:  1. Tylenol     Full Medication List:    Current Outpatient Prescriptions:     acetaminophen (TYLENOL) 500 MG tablet, Take 500 mg by mouth every 6 (six) hours as needed for Pain., Disp: , Rfl:     chlorthalidone (HYGROTEN) 25 MG Tab, Take 1 tablet (25 mg total) by mouth once daily., Disp: 90 tablet, Rfl: 1    ibuprofen (ADVIL) 200 MG tablet, Take 200 mg by mouth as needed. 1 Tablet Oral .  Last taken 12/14/11, Disp: , Rfl:     meloxicam (MOBIC) 15 MG tablet, Take 15 mg by mouth once daily., Disp: , Rfl: 0    omeprazole (PRILOSEC) 20 MG capsule, Take 20 mg by mouth once daily., Disp: , Rfl: 0    rosuvastatin (CRESTOR) 10 MG tablet, Take 1 tablet (10 mg total) by mouth once daily., Disp: 90 tablet, Rfl: 3    zolpidem (AMBIEN) 5 MG Tab, Take 1 tablet (5 mg total) by mouth nightly as needed., Disp: 30 tablet, Rfl: 2  "    Review of Systems:  Review of Systems   Constitutional: Negative for chills and fever.   HENT: Negative for nosebleeds.    Eyes: Negative for pain.   Respiratory: Negative for hemoptysis.    Cardiovascular: Negative for chest pain.   Gastrointestinal: Negative for nausea and vomiting.   Genitourinary: Negative for dysuria.   Musculoskeletal: Positive for back pain.   Skin: Negative for rash.   Neurological: Negative for tremors.   Endo/Heme/Allergies: Does not bruise/bleed easily.   Psychiatric/Behavioral: Negative for suicidal ideas.       Allergies:  Sulfacetamide     Medical History:  Past Medical History:   Diagnosis Date    DJD (degenerative joint disease)     Dyslipidemia     Hypertension     Nuclear sclerosis - Both Eyes 2012    Rosacea     Vitamin D deficiency disease         Surgical History:  Past Surgical History:   Procedure Laterality Date    APPENDECTOMY      BREAST BIOPSY Left     2015    BREAST BIOPSY Right     10/2015 ex bx- papilloma     breast biopsy, left      10/2015     SECTION, CLASSIC      Three times    CHOLECYSTECTOMY      SHOULDER OPEN ROTATOR CUFF REPAIR Right     TOTAL KNEE ARTHROPLASTY      right knee        Social History:  Social History     Social History    Marital status:      Spouse name: N/A    Number of children: N/A    Years of education: N/A     Occupational History    Not on file.     Social History Main Topics    Smoking status: Never Smoker    Smokeless tobacco: Never Used    Alcohol use 0.6 oz/week     1 Glasses of wine per week      Comment: occ    Drug use: No    Sexual activity: Not on file     Other Topics Concern    Not on file     Social History Narrative    No narrative on file       Physical Exam:  Vitals:    17 1545   BP: (!) 157/88   Pulse: 87   Resp: 18   Weight: 76.2 kg (168 lb 1.6 oz)   Height: 5' 3" (1.6 m)   PainSc:   8     General    Nursing note and vitals reviewed.  Constitutional: She is oriented " to person, place, and time. She appears well-developed and well-nourished. No distress.   HENT:   Head: Normocephalic and atraumatic.   Nose: Nose normal.   Eyes: Conjunctivae and EOM are normal. Pupils are equal, round, and reactive to light. Right eye exhibits no discharge. Left eye exhibits no discharge. No scleral icterus.   Neck: No JVD present.   Cardiovascular: Intact distal pulses.    Pulmonary/Chest: Effort normal. No respiratory distress.   Abdominal: She exhibits no distension.   Neurological: She is alert and oriented to person, place, and time. Coordination normal.   Psychiatric: She has a normal mood and affect. Her behavior is normal. Judgment and thought content normal.     General Musculoskeletal Exam   Gait: antalgic         Right Hip Exam     Tests   Pain w/ forced internal rotation (NAKITA): absent  Left Hip Exam     Tests   Pain w/ forced internal rotation (NAKITA): present      Back (L-Spine & T-Spine) / Neck (C-Spine) Exam     Tenderness Left paramedian tenderness of the Sacrum.     Back (L-Spine & T-Spine) Range of Motion   Extension: abnormal Back extension: facet loading positive on left.         Imaging:MRI Lumbar Spine Without Contrast   [id number] 11/30/17  05:58:48 [id number] ([facility]) : MRI LUMBAR SPINE WITHOUT CONTRAST    SUPPLIED CLINICAL HISTORY:  Low back pain    TECHNIQUE:  Multiplanar, multisequence images of the lumbar spine were obtained without the use of intravenous contrast.      COMPARISON: Lumbar spine radiograph 10/12/2017     FINDINGS:    There is grade 1 anterolisthesis of L4 on L5.  Vertebral body heights are well-maintained without signal abnormality to suggest acute fracture or infiltrative marrow placement process.  Intervertebral discs demonstrate multilevel degeneration with disc space height loss most prominent at L5-S1 and L1-L2.  Conus and lumbar spinal nerve roots are normal in signal.  Spinal cord terminates at the L1 vertebral level.  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    Limited evaluation of the surrounding soft tissues demonstrate no significant abnormalities.      T12-L1:  No focal disc abnormalities.  No significant spinal canal stenosis or neuroforaminal narrowing.    L1-2:  Circumferential disc bulge as well as mild bilateral facet hypertrophy results in mild spinal canal stenosis as well as severe right and moderate left neuroforaminal narrowing.      L2-3:  Circumferential disc bulge as well as mild bilateral facet hypertrophy results in moderate spinal canal stenosis as well as moderate bilateral neuroforaminal narrowing.      L3-4:  Circumferential disc bulge as well as moderate bilateral facet hypertrophy and ligamentum flavum thickening results in mild spinal canal stenosis as well as mild right and moderate left neuroforaminal narrowing      L4-5:  Grade 1 anterolisthesis along with moderate bilateral facet hypertrophy and ligamentum flavum thickening results in moderate spinal canal stenosis as well as mild bilateral neuroforaminal narrowing.      L5-S1:  Mild circumferential disc bulge as well as mild bilateral facet hypertrophy results in moderate right and severe left neuroforaminal narrowing.   Impression          1.  Multilevel spondylosis of the lumbar spine results in moderate spinal canal stenosis at L2-3 and L4-5 as well as severe right neuroforaminal narrowing at L1-2 and severe left neuroforaminal narrowing at L5-S1.    2.  Grade one anterolisthesis of L4 on L5.  ______________________________________     Electronically signed by resident: REFUGIO HERNANDEZ MD  Date: 11/30/17  Time: 10:59    As the supervising and teaching physician, I personally reviewed the images and resident's interpretation and I agree with the findings.    Electronically signed by: Willam Torres MD  Date: 11/30/17  Time: 15:28        X-Ray Lumbar Spine Ap Lateral w/Flex Ext  5 views of the lumbar spine were obtained, with AP, lateral, lumbosacral lateral, and lateral flexion and  extension images all submitted.    Mild scoliosis convex to the right in the lumbar region is seen, as is 7.5 mm of anterolisthesis of L4 in relation to L5, the latter alignment abnormality secondary to L4-5 facet arthropathy.  Alignment at other levels appears unremarkable, and there is no evidence of significant translational instability seen on the weight- bearing flexion/extension images.  Vertebral body heights are normally maintained, without compression deformity at any level.  There is prominent disc narrowing seen at every lumbar level, the most severe degrees of narrowing being at L1-2 and L5-S1, with a vacuum phenomenon indicative of disc desiccation seen at the L5-S1 level.  Marginal vertebral endplate spurring is observed, particularly at L1-2 and L5-S1.  Facet arthropathy is seen at L5-S1, in addition to that previously mentioned at L4-5.  Vertebral endplates are well-defined.  No osseous destructive process.  Surgical clips are seen in the right upper quadrant, and calcification in the wall of the abdominal aorta, without definable aneurysm, is also incidentally observed.   Impression      Advanced multilevel degenerative changes are identified as discussed in detail above, with the disc narrowing and vertebral endplate spurring most pronounced at L1-2 and L5-S1, and with anterolisthesis seen at the L4-5 level on the basis of underlying facet arthropathy.  Although the findings are similar to the exam of 8/16/12, there has been some progression of this degenerative change since that time, particularly at the L1-2 level, which exhibits considerably greater disc narrowing on the current exam.  No compression fracture.  No translational instability.      Electronically signed by: Willam Friedman MD  Date: 10/12/17  Time: 08:58          Labs:  BMP  Lab Results   Component Value Date     10/20/2017    K 5.0 10/20/2017     10/20/2017    CO2 27 10/20/2017    BUN 21 10/20/2017    CREATININE 1.1  10/20/2017    CALCIUM 10.0 10/20/2017    ANIONGAP 10 10/20/2017    ESTGFRAFRICA 56.8 (A) 10/20/2017    EGFRNONAA 49.2 (A) 10/20/2017     Lab Results   Component Value Date    ALT 39 10/20/2017    AST 30 10/20/2017    ALKPHOS 77 10/20/2017    BILITOT 1.0 10/20/2017       Diagnoses & Associated Orders:  Problem List Items Addressed This Visit     Lumbar spondylosis    Myofascial pain syndrome    DDD (degenerative disc disease), lumbosacral    Spinal stenosis of lumbosacral region        78 yo F with axial right lower back pain due to SI dysfunction and facet arthopathy without evidence of radiculopathy at this time.  While severe DDD and NFS may be a component of her pain, exam and HPI is most consistent with SI as the primary pain generator as this time.     Recommendations & Interventions:   Procedures: Start with LEFT SI injection x1.  Consider LEFT L3,4,5 MBB/RFA sequence  Medications: None recommended at this time.  Imaging: No additional at this time  PT/OT: She endorses frequent exercise but may benefit from referral to PT for SI joint.  HEP: Cont walking daily as tolerated  Diet:   Other:   Follow Up: RTC 2-4 weeks p inj    Ricci Lockett Jr, MD  Interventional Pain Medicine / Anesthesiology    Disclaimer: This note was partly generated using dictation software which may occasionally result in transcription errors.

## 2017-12-14 NOTE — DISCHARGE SUMMARY
[facility]  Discharge Note  Short Stay    Admit Date: 12/14/2017    Discharge Date and Time: No discharge date for patient encounter.     Attending Physician: Ricci Lockett Jr., MD     Discharge Provider: Ricci Lockett Jr    Diagnoses:  Active Hospital Problems    Diagnosis  POA    *SI (sacroiliac) joint dysfunction [M53.3]  Yes      Resolved Hospital Problems    Diagnosis Date Resolved POA   No resolved problems to display.       Discharged Condition: stable    Hospital Course: Patient was admitted for an outpatient procedure and tolerated the procedure well with no complications.    Final Diagnoses: Same as principal problem.    Disposition: Home or Self Care    Follow up/Patient Instructions:    Medications:  Reconciled Home Medications:   Current Discharge Medication List      CONTINUE these medications which have NOT CHANGED    Details   acetaminophen (TYLENOL) 500 MG tablet Take 500 mg by mouth every 6 (six) hours as needed for Pain.      chlorthalidone (HYGROTEN) 25 MG Tab Take 1 tablet (25 mg total) by mouth once daily.  Qty: 90 tablet, Refills: 1      ibuprofen (ADVIL) 200 MG tablet Take 200 mg by mouth as needed. 1 Tablet Oral .  Last taken 12/14/11      meloxicam (MOBIC) 15 MG tablet Take 15 mg by mouth once daily.  Refills: 0      omeprazole (PRILOSEC) 20 MG capsule Take 20 mg by mouth once daily.  Refills: 0      rosuvastatin (CRESTOR) 10 MG tablet Take 1 tablet (10 mg total) by mouth once daily.  Qty: 90 tablet, Refills: 3      zolpidem (AMBIEN) 5 MG Tab Take 1 tablet (5 mg total) by mouth nightly as needed.  Qty: 30 tablet, Refills: 2             Discharge Procedure Orders  Diet general     Activity as tolerated     Call MD for:  temperature >100.4     Call MD for:  severe uncontrolled pain     Call MD for:  redness, tenderness, or signs of infection (pain, swelling, redness, odor or green/yellow discharge around incision site)     Call MD for:  difficulty breathing or increased  cough     Call MD for:  severe persistent headache     Call MD for:  worsening rash     Remove dressing in 24 hours       Follow-up Information     Ricci Lockett Jr, MD.    Specialty:  Pain Medicine  Why:  Post-procedural Follow Up As Scheduled  Contact information:  [address]  [address]  [place] [id number] [phone]                   Discharge Procedure Orders (must include Diet, Follow-up, Activity):    Discharge Procedure Orders (must include Diet, Follow-up, Activity)  Diet general     Activity as tolerated     Call MD for:  temperature >100.4     Call MD for:  severe uncontrolled pain     Call MD for:  redness, tenderness, or signs of infection (pain, swelling, redness, odor or green/yellow discharge around incision site)     Call MD for:  difficulty breathing or increased cough     Call MD for:  severe persistent headache     Call MD for:  worsening rash     Remove dressing in 24 hours

## 2017-12-14 NOTE — OP NOTE
Procedure Note    Pre-operative Diagnosis: SI (sacroiliac) joint dysfunction  Post-operative Diagnosis: SI (sacroiliac) joint dysfunction  Procedure: (1) LEFT Sacroiliac Joint Injection and (2) Intraoperative fluoroscopy  Procedure Date: 12/14/2017      Anesthesia: LOCAL    Indications: (1) To alleviate pain and suffering, (2) reduce functional impairment, and for (3) diagnostic purposes.    The patients history and physical exam were reviewed. The risks (local discomfort, infection, headache, temporary or permanent weakness and/or numbness of one or both legs, temporary or permanent paraplegia, heart attack and stroke), benefits and alternatives to the procedure were discussed, and all questions were answered to the patients satisfaction. The patient agreed to proceed, and written, informed consent was verified.    Procedure in Detail: Patient was brought back to procedure room and placed in a prone position and head resting comfortably in head ring. Prior to the initiation of the procedure, the patient's identity, the site, and the nature of the procedure were verified.     The skin was prepped with chloroprep and sterile drapes were applied. The Left SI joint was identified by fluoroscopy in an oblique plane. A 25g 3 1/2 inch spinal needle was advanced under intermittent fluoroscopy until joint space was entered. There was no paresthesia with needle placement. Aspiration was negative for blood. Omnipaque 0.5 mL was injected confirming appropriate position and spread into the joint without intravascualr uptake. Next, 1 mL containing 40 mg depomedrol with 1 mL of 0.5% Marcaine  was injected without difficulty or resistance.  Needle was removed with flush and bandaged placed over site of needle insertion.      EBL: nil    Disposition: The patient tolerated the procedure well, and there were no apparent complications. Vital signs remained stable throughout the procedure. The patient was taken to the recovery area  where written discharge instructions for the procedure were given.     Follow-up: RTC for follow up as scheduled      Ricci Lockett Jr, MD  Interventional Pain Medicine / Anesthesiology

## 2017-12-22 ENCOUNTER — TELEPHONE (OUTPATIENT)
Dept: PAIN MEDICINE | Facility: CLINIC | Age: 77
End: 2017-12-22

## 2017-12-22 NOTE — TELEPHONE ENCOUNTER
Spoke with patient regarding f/u appt. Patient was informed that Dr. Lockett will be out of the office on 1/12/18 and that she will need to reschedule her appt. Patient verbalized and confirmed appt date and time of arrival on 1/19/18 at 140 PM.

## 2018-01-26 ENCOUNTER — OFFICE VISIT (OUTPATIENT)
Dept: OTOLARYNGOLOGY | Facility: CLINIC | Age: 78
End: 2018-01-26
Payer: MEDICARE

## 2018-01-26 VITALS
BODY MASS INDEX: 29.33 KG/M2 | TEMPERATURE: 98 F | SYSTOLIC BLOOD PRESSURE: 147 MMHG | WEIGHT: 165.56 LBS | DIASTOLIC BLOOD PRESSURE: 79 MMHG | HEART RATE: 69 BPM

## 2018-01-26 DIAGNOSIS — R49.9 VOICE DISTURBANCE: Primary | ICD-10-CM

## 2018-01-26 DIAGNOSIS — J38.01 UNILATERAL PARTIAL VOCAL FOLD PARALYSIS: ICD-10-CM

## 2018-01-26 PROCEDURE — 99999 PR PBB SHADOW E&M-EST. PATIENT-LVL III: CPT | Mod: PBBFAC,,, | Performed by: OTOLARYNGOLOGY

## 2018-01-26 PROCEDURE — 31579 LARYNGOSCOPY TELESCOPIC: CPT | Mod: S$GLB,,, | Performed by: OTOLARYNGOLOGY

## 2018-01-26 PROCEDURE — 99213 OFFICE O/P EST LOW 20 MIN: CPT | Mod: 25,S$GLB,, | Performed by: OTOLARYNGOLOGY

## 2018-01-26 NOTE — PROGRESS NOTES
[facility]  Department of Otorhinolaryngology and Communication Sciences    Subjective:      Zack Hopper is a 77 y.o. female who presents for follow-up. She has idiopathic left vocal fold immobility, potentially recoverable, onset of symptoms January 2017.    She underwent injection augmentation as below. Since her last visit, her voice has remained stable and is essentially back to normal. She is very pleased with her progress and is without any complaints at this time. Her health is otherwise unchanged since her last visit.    TREATMENT HISTORY:  6/9/2017: left vocal fold injection augmentation - Restylane-L    Voice Handicap Index - 10 (VHI-10) score is 0 (down from 33).    The patient's medications, allergies, past medical, surgical, social and family histories were reviewed and updated as appropriate.    A detailed review of systems was obtained with pertinent positives as per the above HPI, and otherwise negative.      Objective:     VS reviewed    Constitutional: comfortable, well dressed  Psychiatric: appropriate affect  Respiratory: comfortably breathing, symmetric chest rise, no stridor  Voice: normal for age and gender  Head: normocephalic  Eyes: conjunctivae and sclerae clear  Indirect laryngoscopy is limited due to gag    Procedure  Rigid Laryngeal Videostroboscopy (25486): Laryngeal videostroboscopy is indicated to assess the laryngeal vibratory biomechanics and vocal fold oscillation, which cannot be assessed with a plain light examination. This was carried out with a 70 degree endoscope. After verbal consent was obtained, the patient was positioned and the tongue was gently secured with a gauze sponge. The endoscope was passed transorally and positioned to image the larynx and hypopharynx in detail. The following featured were examined: laryngeal and hypopharyngeal masses; vocal fold range and symmetry of motion; laryngeal mucosal edema, erythema, inflammation, and hydration;  salivary pooling; and gross laryngeal sensation. During phonation, the vocal folds were assesses for glottal closure; mucosal wave; vocal fold lesions; vibratory periodicity, amplitude, and phase symmetry; and vertical height match. The equipment was removed. The patient tolerated the procedure well without complication. All findings were normal except:  - left vocal fold slightly foreshortened, with very minimal motion impairment--trace limitation of adduction; nearly complete recovery of function  - minimal posterior gap  - pliability intact with in-phase vibration      Assessment:     Zack Hopper is a 77 y.o. female with idiopathic left vocal fold motion impairment, onset of symptoms January 2017. Her exam shows near-complete recovery of function.      Plan:     Reassurance was provided. I recommended no further treatment. She will follow up with me in the future on an as-needed basis.    All questions were answered, and the patient is in agreement with the plan.    Yomi Horowitz M.D.  [facility]  Department of Otorhinolaryngology and Communication Sciences

## 2018-01-29 RX ORDER — ZOLPIDEM TARTRATE 5 MG/1
5 TABLET ORAL NIGHTLY PRN
Qty: 30 TABLET | Refills: 2 | Status: SHIPPED | OUTPATIENT
Start: 2018-01-29 | End: 2018-05-07 | Stop reason: SDUPTHER

## 2018-03-02 ENCOUNTER — PES CALL (OUTPATIENT)
Dept: ADMINISTRATIVE | Facility: CLINIC | Age: 78
End: 2018-03-02

## 2018-04-11 NOTE — PROGRESS NOTES
[facility] Established Patient Evaluation    Referred by: Dr. Rosemarie Hastings  Reason for referral: Left low back pain    CC:   Chief Complaint   Patient presents with    Low-back Pain     Interval Update:    4/13/18 - Patient returns to clinic following a LEFT Sacroiliac Joint Injection done 12/14/17 with 60% relief.  She also inquires about limitations in activity specifically related to yoga and gym workouts.  She continues to have pain in the low back near the SI joint especially at night with pain going up to 8/10 and causing insomnia.    Background:  Zack Hopper is a pleasant 77 y.o. female who presented to my clinic complaining of lower back pain as characterized below.    Location: lower back   Severity: Currently: 7/10   Typical Range: 5-8/10     Exacerbation: 7/10   Onset: a few months ago  Quality: Aching  Radiation: Right posterior thigh  Axial/Extremity Percentage of Pain: 90/10  Exacerbating Factors: standing for more than 5 minutes and walking for more than 5 minutes  Mitigating Factors: ice and medications  Assoc: denies night fever/night sweats, urinary incontinence, bowel incontinence, significant weight loss, significant motor weakness and loss of sensations    Previous Therapies:  PT: Denies  HEP: Endorses daily walks  TENS:  Injections:    - 12/14/17 LEFT Sacroiliac Joint Injection - 60% relief  Surgery: Denies  Medications:    - NSAIDS: Yes, ibuprofen & meloxicam   - MSK Relaxants:    - TCAs:    - SNRIs:    - Topicals:     Current Pain Medications:  1. Tylenol   2. Ibuprofen    Full Medication List:    Current Outpatient Prescriptions:     chlorthalidone (HYGROTEN) 25 MG Tab, Take 1 tablet (25 mg total) by mouth once daily., Disp: 90 tablet, Rfl: 1    ibuprofen (ADVIL) 200 MG tablet, Take 200 mg by mouth as needed. 1 Tablet Oral .  Last taken 12/14/11, Disp: , Rfl:     omeprazole (PRILOSEC) 20 MG capsule, Take 20 mg by mouth once daily., Disp: , Rfl: 0     rosuvastatin (CRESTOR) 10 MG tablet, Take 1 tablet (10 mg total) by mouth once daily., Disp: 90 tablet, Rfl: 3    zolpidem (AMBIEN) 5 MG Tab, Take 1 tablet (5 mg total) by mouth nightly as needed., Disp: 30 tablet, Rfl: 2    acetaminophen (TYLENOL) 500 MG tablet, Take 500 mg by mouth every 6 (six) hours as needed for Pain., Disp: , Rfl:     meloxicam (MOBIC) 15 MG tablet, Take 15 mg by mouth once daily., Disp: , Rfl: 0     Review of Systems:  Review of Systems   Constitutional: Negative for chills and fever.   HENT: Negative for nosebleeds.    Eyes: Negative for pain.   Respiratory: Negative for hemoptysis.    Cardiovascular: Negative for chest pain.   Gastrointestinal: Negative for nausea and vomiting.   Genitourinary: Negative for dysuria.   Musculoskeletal: Positive for back pain.   Skin: Negative for rash.   Neurological: Negative for tremors.   Endo/Heme/Allergies: Does not bruise/bleed easily.   Psychiatric/Behavioral: Negative for suicidal ideas.       Allergies:  Sulfacetamide     Medical History:  Past Medical History:   Diagnosis Date    DJD (degenerative joint disease)     Dyslipidemia     Hypertension     Nuclear sclerosis - Both Eyes 2012    Rosacea     Vitamin D deficiency disease         Surgical History:  Past Surgical History:   Procedure Laterality Date    APPENDECTOMY      BREAST BIOPSY Left     2015    BREAST BIOPSY Right     10/2015 ex bx- papilloma     breast biopsy, left      10/2015     SECTION, CLASSIC      Three times    CHOLECYSTECTOMY      SHOULDER OPEN ROTATOR CUFF REPAIR Right     TOTAL KNEE ARTHROPLASTY      right knee        Social History:  Social History     Social History    Marital status:      Spouse name: N/A    Number of children: N/A    Years of education: N/A     Occupational History    Not on file.     Social History Main Topics    Smoking status: Never Smoker    Smokeless tobacco: Never Used    Alcohol use 0.6 oz/week     1  Glasses of wine per week      Comment: occ    Drug use: No    Sexual activity: Not on file     Other Topics Concern    Not on file     Social History Narrative    No narrative on file       Physical Exam:  Vitals:    04/13/18 0804   BP: 124/70   Weight: 77 kg (169 lb 12.1 oz)   PainSc:   7     General    Nursing note and vitals reviewed.  Constitutional: She is oriented to person, place, and time. She appears well-developed and well-nourished. No distress.   HENT:   Head: Normocephalic and atraumatic.   Nose: Nose normal.   Eyes: Conjunctivae and EOM are normal. Pupils are equal, round, and reactive to light. Right eye exhibits no discharge. Left eye exhibits no discharge. No scleral icterus.   Neck: No JVD present.   Cardiovascular: Intact distal pulses.    Pulmonary/Chest: Effort normal. No respiratory distress.   Abdominal: She exhibits no distension.   Neurological: She is alert and oriented to person, place, and time. Coordination normal.   Psychiatric: She has a normal mood and affect. Her behavior is normal. Judgment and thought content normal.     General Musculoskeletal Exam   Gait: antalgic         Right Hip Exam     Tests   Pain w/ forced internal rotation (NAKITA): absent  Left Hip Exam     Tests   Pain w/ forced internal rotation (NAKITA): present      Back (L-Spine & T-Spine) / Neck (C-Spine) Exam     Tenderness Left paramedian tenderness of the Sacrum.     Back (L-Spine & T-Spine) Range of Motion   Extension: abnormal Back extension: facet loading positive on left.         Imaging:MRI Lumbar Spine Without Contrast   [id number] 11/30/17  05:58:48 [id number] ([facility]) : MRI LUMBAR SPINE WITHOUT CONTRAST    SUPPLIED CLINICAL HISTORY:  Low back pain    TECHNIQUE:  Multiplanar, multisequence images of the lumbar spine were obtained without the use of intravenous contrast.      COMPARISON: Lumbar spine radiograph 10/12/2017     FINDINGS:    There is grade 1 anterolisthesis of L4 on L5.  Vertebral body heights are  well-maintained without signal abnormality to suggest acute fracture or infiltrative marrow placement process.  Intervertebral discs demonstrate multilevel degeneration with disc space height loss most prominent at L5-S1 and L1-L2.  Conus and lumbar spinal nerve roots are normal in signal.  Spinal cord terminates at the L1 vertebral level.  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    Limited evaluation of the surrounding soft tissues demonstrate no significant abnormalities.      T12-L1:  No focal disc abnormalities.  No significant spinal canal stenosis or neuroforaminal narrowing.    L1-2:  Circumferential disc bulge as well as mild bilateral facet hypertrophy results in mild spinal canal stenosis as well as severe right and moderate left neuroforaminal narrowing.      L2-3:  Circumferential disc bulge as well as mild bilateral facet hypertrophy results in moderate spinal canal stenosis as well as moderate bilateral neuroforaminal narrowing.      L3-4:  Circumferential disc bulge as well as moderate bilateral facet hypertrophy and ligamentum flavum thickening results in mild spinal canal stenosis as well as mild right and moderate left neuroforaminal narrowing      L4-5:  Grade 1 anterolisthesis along with moderate bilateral facet hypertrophy and ligamentum flavum thickening results in moderate spinal canal stenosis as well as mild bilateral neuroforaminal narrowing.      L5-S1:  Mild circumferential disc bulge as well as mild bilateral facet hypertrophy results in moderate right and severe left neuroforaminal narrowing.   Impression          1.  Multilevel spondylosis of the lumbar spine results in moderate spinal canal stenosis at L2-3 and L4-5 as well as severe right neuroforaminal narrowing at L1-2 and severe left neuroforaminal narrowing at L5-S1.    2.  Grade one anterolisthesis of L4 on L5.  ______________________________________     Electronically signed by resident: REFUGIO HERNANDEZ MD  Date: 11/30/17  Time:  10:59    As the supervising and teaching physician, I personally reviewed the images and resident's interpretation and I agree with the findings.    Electronically signed by: Willam Torres MD  Date: 11/30/17  Time: 15:28        X-Ray Lumbar Spine Ap Lateral w/Flex Ext  5 views of the lumbar spine were obtained, with AP, lateral, lumbosacral lateral, and lateral flexion and extension images all submitted.    Mild scoliosis convex to the right in the lumbar region is seen, as is 7.5 mm of anterolisthesis of L4 in relation to L5, the latter alignment abnormality secondary to L4-5 facet arthropathy.  Alignment at other levels appears unremarkable, and there is no evidence of significant translational instability seen on the weight- bearing flexion/extension images.  Vertebral body heights are normally maintained, without compression deformity at any level.  There is prominent disc narrowing seen at every lumbar level, the most severe degrees of narrowing being at L1-2 and L5-S1, with a vacuum phenomenon indicative of disc desiccation seen at the L5-S1 level.  Marginal vertebral endplate spurring is observed, particularly at L1-2 and L5-S1.  Facet arthropathy is seen at L5-S1, in addition to that previously mentioned at L4-5.  Vertebral endplates are well-defined.  No osseous destructive process.  Surgical clips are seen in the right upper quadrant, and calcification in the wall of the abdominal aorta, without definable aneurysm, is also incidentally observed.   Impression      Advanced multilevel degenerative changes are identified as discussed in detail above, with the disc narrowing and vertebral endplate spurring most pronounced at L1-2 and L5-S1, and with anterolisthesis seen at the L4-5 level on the basis of underlying facet arthropathy.  Although the findings are similar to the exam of 8/16/12, there has been some progression of this degenerative change since that time, particularly at the L1-2 level, which exhibits  considerably greater disc narrowing on the current exam.  No compression fracture.  No translational instability.      Electronically signed by: Willam Friedman MD  Date: 10/12/17  Time: 08:58          Labs:  BMP  Lab Results   Component Value Date     10/20/2017    K 5.0 10/20/2017     10/20/2017    CO2 27 10/20/2017    BUN 21 10/20/2017    CREATININE 1.1 10/20/2017    CALCIUM 10.0 10/20/2017    ANIONGAP 10 10/20/2017    ESTGFRAFRICA 56.8 (A) 10/20/2017    EGFRNONAA 49.2 (A) 10/20/2017     Lab Results   Component Value Date    ALT 39 10/20/2017    AST 30 10/20/2017    ALKPHOS 77 10/20/2017    BILITOT 1.0 10/20/2017       Diagnoses & Associated Orders:  Problem List Items Addressed This Visit     SI (sacroiliac) joint dysfunction    Relevant Medications    celecoxib (CELEBREX) 100 MG capsule    Other Relevant Orders    BASIC METABOLIC PANEL    Insomnia    Lumbar spondylosis    Relevant Medications    celecoxib (CELEBREX) 100 MG capsule    Myofascial pain syndrome    DDD (degenerative disc disease), lumbosacral - Primary    Relevant Medications    celecoxib (CELEBREX) 100 MG capsule    Stage 1 chronic kidney disease    Relevant Orders    BASIC METABOLIC PANEL        76 yo F with axial right lower back pain due to SI dysfunction and facet arthopathy without evidence of radiculopathy at this time.  While severe DDD and NFS may be a component of her pain, exam and HPI is most consistent with SI as the primary pain generator as this time.     4/13/18 - I strongly encouraged her to resume yoga and activity.  She previously used Celebrex for her pain and is amenable to trying it again.  Her labs show a renal function trend that is borderline normal vs CKD I and I will order a BMP today to verify that function is stable and appropriate.    Recommendations & Interventions:   Procedures: Repeat LEFT SI injection x1  Medications:    - D/c all other NSAIDS   - Start Celebrex 100 mg BID   - BMP ordered today to verify  stable renal function  Imaging: No additional at this time  PT/OT: Restart yoga and patient is amenable to PT if no improvement within 3 months  HEP: Cont walking daily as tolerated  Follow Up: RTC 2-4 weeks p inj    Ricci Lockett Jr, MD  Interventional Pain Medicine / Anesthesiology    Disclaimer: This note was partly generated using dictation software which may occasionally result in transcription errors.

## 2018-04-13 ENCOUNTER — LAB VISIT (OUTPATIENT)
Dept: LAB | Facility: HOSPITAL | Age: 78
End: 2018-04-13
Attending: PAIN MEDICINE
Payer: MEDICARE

## 2018-04-13 ENCOUNTER — OFFICE VISIT (OUTPATIENT)
Dept: PAIN MEDICINE | Facility: CLINIC | Age: 78
End: 2018-04-13
Payer: MEDICARE

## 2018-04-13 VITALS
SYSTOLIC BLOOD PRESSURE: 124 MMHG | BODY MASS INDEX: 30.07 KG/M2 | DIASTOLIC BLOOD PRESSURE: 70 MMHG | WEIGHT: 169.75 LBS

## 2018-04-13 DIAGNOSIS — N18.1 STAGE 1 CHRONIC KIDNEY DISEASE: ICD-10-CM

## 2018-04-13 DIAGNOSIS — M79.18 MYOFASCIAL PAIN SYNDROME: ICD-10-CM

## 2018-04-13 DIAGNOSIS — M51.37 DDD (DEGENERATIVE DISC DISEASE), LUMBOSACRAL: Primary | ICD-10-CM

## 2018-04-13 DIAGNOSIS — M53.3 SI (SACROILIAC) JOINT DYSFUNCTION: ICD-10-CM

## 2018-04-13 DIAGNOSIS — M47.816 LUMBAR SPONDYLOSIS: ICD-10-CM

## 2018-04-13 DIAGNOSIS — G47.01 INSOMNIA DUE TO MEDICAL CONDITION: ICD-10-CM

## 2018-04-13 LAB
ANION GAP SERPL CALC-SCNC: 7 MMOL/L
BUN SERPL-MCNC: 25 MG/DL
CALCIUM SERPL-MCNC: 9.5 MG/DL
CHLORIDE SERPL-SCNC: 103 MMOL/L
CO2 SERPL-SCNC: 31 MMOL/L
CREAT SERPL-MCNC: 1 MG/DL
EST. GFR  (AFRICAN AMERICAN): >60 ML/MIN/1.73 M^2
EST. GFR  (NON AFRICAN AMERICAN): 55 ML/MIN/1.73 M^2
GLUCOSE SERPL-MCNC: 109 MG/DL
POTASSIUM SERPL-SCNC: 3.3 MMOL/L
SODIUM SERPL-SCNC: 141 MMOL/L

## 2018-04-13 PROCEDURE — 36415 COLL VENOUS BLD VENIPUNCTURE: CPT

## 2018-04-13 PROCEDURE — 99999 PR PBB SHADOW E&M-EST. PATIENT-LVL II: CPT | Mod: PBBFAC,,, | Performed by: PAIN MEDICINE

## 2018-04-13 PROCEDURE — 80048 BASIC METABOLIC PNL TOTAL CA: CPT

## 2018-04-13 PROCEDURE — 99214 OFFICE O/P EST MOD 30 MIN: CPT | Mod: S$GLB,,, | Performed by: PAIN MEDICINE

## 2018-04-13 PROCEDURE — 3078F DIAST BP <80 MM HG: CPT | Mod: CPTII,S$GLB,, | Performed by: PAIN MEDICINE

## 2018-04-13 PROCEDURE — 3074F SYST BP LT 130 MM HG: CPT | Mod: CPTII,S$GLB,, | Performed by: PAIN MEDICINE

## 2018-04-13 RX ORDER — CELECOXIB 100 MG/1
100 CAPSULE ORAL 2 TIMES DAILY
Qty: 60 CAPSULE | Refills: 1 | Status: SHIPPED | OUTPATIENT
Start: 2018-04-13 | End: 2018-05-18 | Stop reason: SDUPTHER

## 2018-04-16 NOTE — DISCHARGE INSTRUCTIONS
Home Care Instructions Pain Management:    1.  DIET:    You may resume your normal diet today.    2.  BATHING:    You may shower with luke warm water.    3.  DRESSING:    You may remove your bandage today.    4.  ACTIVITY LEVEL:      You may resume your normal activities 24 hours after your procedure.    5.  MEDICATIONS:    You may resume your normal medications today.    6.  SPECIAL INSTRUCTIONS:    No heat to the injection site for 24 hours including bath or shower, heating pad, moist heat or hot tubs.    Use an ice pack to the injection site for any pain or discomfort.  Apply ice packs for 20 minute intervals as needed.    If you have received any sedatives by mouth today, you can not drive for 12 hours.    If you have received sedation through an IV, you can not drive for 24 hours.    PLEASE CALL YOUR DOCTOR FOR THE FOLLOWIN.  Redness or swelling around the injection site.  2.  Fever of 101 degrees.  3.  Drainage (pus) from the injection site.  4.  For any continuous bleeding (some dried blood over the incision is normal.)    FOR EMERGENCIES:    If any unusual problems or difficulties occur during clinic hours, call [phone] or dial [phone].    Follow up with with your physician in 2-3 weeks.       Procedural Sedation  Procedural sedation is medicine to ease discomfort, pain, and anxiety during a procedure. The medicine is often given through an IV (intravenous) line in your arm or hand. In some cases, the medicine may be taken by mouth or inhaled. While you are under sedation, you will likely be awake. But you may not remember anything afterward.  Why procedural sedation is used  Sedation is used for many types of procedures. The goal is to reduce pain, anxiety, and stressful memories of a procedure. It can also help your healthcare provider treat you. For example, having a broken bone fixed may be easier if you feel relaxed.  Procedural sedation is used only for short, basic procedures. It is not used  for complex surgeries. Some procedures that use this type of sedation include:  · Dental surgery  · Breast biopsy, to take a sample of breast tissue  · Endoscopy, to look at gastrointestinal problems  · Bronchoscopy, to check for lung problems  · Bone or joint realignment, to fix a broken bone or dislocated joint  · Minor foot or skin surgery  · Electrical cardioversion, to restore a normal heart rhythm  · Lumbar puncture, to assess neurological disease  Risks of procedural sedation  Procedural sedation has some risks and possible side effects, such as:  · Headache  · Nausea and vomiting  · Unpleasant memory of the procedure  · Lowered rate of breathing  · Changes in heart rate and blood pressure (rare)  · Inhalation of stomach contents into your lungs (rare)  Side effects will likely go away shortly after the procedure. Your healthcare team will watch your heart rate and breathing during your sedation. This is to help prevent problems.  Your own risks may vary based on your age and your overall health. They also depend on the type of sedation you are given. Talk with your healthcare provider about the risks that apply most to you.  Getting ready for procedural sedation  Talk with your healthcare provider about how to get ready for your procedure. Tell him or her about all the medicines you take. This includes over-the-counter medicines such as ibuprofen. It also includes vitamins, herbs, and other supplements. You may need to stop taking some medicines before the procedure, such as blood thinners and aspirin. If you smoke, you should stop to lessen the chance of a lung issue. Talk with your healthcare provider if you need help to stop smoking.  Tell your healthcare provider if you:  · Have had any problems in the past with sedation or anesthesia  · Have had any recent changes in your health, such as an infection or fever  · Are pregnant or think you may be pregnant  Also be sure to:  · Ask a family member or friend  to take you home after the procedure. You cant drive on the day you receive sedation.  · Not eat or drink after midnight the night before your procedure, if advised.  · Not plan on making any important decisions, such as financial or legal, for the day after you receive the sedation.  · Follow all other instructions from your healthcare provider.  During your procedural sedation  You may have your procedure in a hospital or a medical clinic. Sedation is done by a trained healthcare provider. In general, you can expect the following:  · You will be given medicine through an IV line in your arm or hand. Or you may receive a shot. The medicine may also be given by mouth. Or you may inhale it through a mask.  · If you receive medicine through an IV, you may feel the effects very quickly. You will start to feel relaxed and drowsy.  · During the procedure, your heart rate, breathing, and blood pressure will be closely watched. Your breathing and blood pressure may decrease a little. But you will likely not need help with your breathing. You may receive a little extra oxygen through a mask or through some soft plastic prongs underneath your nose.  · You will probably be awake the entire time. If you do fall asleep, you should be easy to wake up, if needed. You should feel little or no pain.  · When your procedure is over, the sedative medicine will be stopped.  After your procedural sedation  You will begin to feel more awake and aware. But you will likely be drowsy for a while afterward. You will be closely watched as you become more alert. You may have a faint memory of the procedure. Or you may not remember it at all.  You should be able to return home within an hour or two after your procedure. Plan to have someone stay with you for a few hours. Side effects such as headache and nausea may go away quickly. Tell your healthcare provider if they continue.  Dont drive or make any important decisions for at least 24  hours. Be sure to follow all after-care instructions.     When to call your healthcare provider  Have someone call your healthcare provider right away if you have any of these:  · Drowsiness that gets worse  · Weakness or dizziness that gets worse  · Repeated vomiting  · You cant be awakened  · Severe or ongoing pain from the procedure, not relieved by the pain medicine   Date Last Reviewed: 2/1/2017  © 1010-5481 [employer]. [address] [place] [address], [place] [address]. All rights reserved. This information is not intended as a substitute for professional medical care. Always follow your healthcare professional's instructions.

## 2018-04-17 ENCOUNTER — TELEPHONE (OUTPATIENT)
Dept: PAIN MEDICINE | Facility: HOSPITAL | Age: 78
End: 2018-04-17

## 2018-04-19 ENCOUNTER — HOSPITAL ENCOUNTER (OUTPATIENT)
Facility: HOSPITAL | Age: 78
Discharge: HOME | End: 2018-04-19
Attending: PAIN MEDICINE | Admitting: PAIN MEDICINE
Payer: MEDICARE

## 2018-04-19 ENCOUNTER — SURGERY (OUTPATIENT)
Age: 78
End: 2018-04-19

## 2018-04-19 VITALS
DIASTOLIC BLOOD PRESSURE: 83 MMHG | BODY MASS INDEX: 29.77 KG/M2 | HEIGHT: 63 IN | SYSTOLIC BLOOD PRESSURE: 147 MMHG | TEMPERATURE: 98 F | HEART RATE: 68 BPM | WEIGHT: 168 LBS | RESPIRATION RATE: 17 BRPM | OXYGEN SATURATION: 98 %

## 2018-04-19 DIAGNOSIS — M53.3 SACROILIAC JOINT DYSFUNCTION: ICD-10-CM

## 2018-04-19 DIAGNOSIS — M53.3 SI (SACROILIAC) JOINT DYSFUNCTION: Primary | ICD-10-CM

## 2018-04-19 PROCEDURE — 27096 INJECT SACROILIAC JOINT: CPT | Performed by: PAIN MEDICINE

## 2018-04-19 PROCEDURE — 25500020 PHARM REV CODE 255: Performed by: PAIN MEDICINE

## 2018-04-19 PROCEDURE — 25000003 PHARM REV CODE 250: Performed by: PAIN MEDICINE

## 2018-04-19 PROCEDURE — 27096 INJECT SACROILIAC JOINT: CPT | Mod: LT,,, | Performed by: PAIN MEDICINE

## 2018-04-19 PROCEDURE — 63600175 PHARM REV CODE 636 W HCPCS: Performed by: PAIN MEDICINE

## 2018-04-19 RX ORDER — BUPIVACAINE HYDROCHLORIDE 2.5 MG/ML
INJECTION, SOLUTION EPIDURAL; INFILTRATION; INTRACAUDAL
Status: DISCONTINUED | OUTPATIENT
Start: 2018-04-19 | End: 2018-04-19 | Stop reason: HOSPADM

## 2018-04-19 RX ORDER — LIDOCAINE HYDROCHLORIDE 10 MG/ML
INJECTION, SOLUTION EPIDURAL; INFILTRATION; INTRACAUDAL; PERINEURAL
Status: DISCONTINUED | OUTPATIENT
Start: 2018-04-19 | End: 2018-04-19 | Stop reason: HOSPADM

## 2018-04-19 RX ORDER — ALPRAZOLAM 0.5 MG/1
0.5 TABLET, ORALLY DISINTEGRATING ORAL ONCE AS NEEDED
Status: COMPLETED | OUTPATIENT
Start: 2018-04-19 | End: 2018-04-19

## 2018-04-19 RX ORDER — TRIAMCINOLONE ACETONIDE 40 MG/ML
INJECTION, SUSPENSION INTRA-ARTICULAR; INTRAMUSCULAR
Status: DISCONTINUED | OUTPATIENT
Start: 2018-04-19 | End: 2018-04-19 | Stop reason: HOSPADM

## 2018-04-19 RX ADMIN — BUPIVACAINE HYDROCHLORIDE 5 ML: 2.5 INJECTION, SOLUTION EPIDURAL; INFILTRATION; INTRACAUDAL; PERINEURAL at 09:04

## 2018-04-19 RX ADMIN — IOHEXOL 5 ML: 300 INJECTION, SOLUTION INTRAVENOUS at 09:04

## 2018-04-19 RX ADMIN — TRIAMCINOLONE ACETONIDE 40 MG: 40 INJECTION, SUSPENSION INTRA-ARTICULAR; INTRAMUSCULAR at 09:04

## 2018-04-19 RX ADMIN — ALPRAZOLAM 0.5 MG: 0.5 TABLET, ORALLY DISINTEGRATING ORAL at 09:04

## 2018-04-19 RX ADMIN — LIDOCAINE HYDROCHLORIDE 5 ML: 10 INJECTION, SOLUTION EPIDURAL; INFILTRATION; INTRACAUDAL; PERINEURAL at 09:04

## 2018-04-19 NOTE — INTERVAL H&P NOTE
The patient has been examined and the H&P has been reviewed:    I concur with the findings and no changes have occurred since H&P was written.    Anesthesia/Surgery risks, benefits and alternative options discussed and understood by patient/family.          Active Hospital Problems    Diagnosis  POA    Sacroiliac joint dysfunction [M53.3]  Yes      Resolved Hospital Problems    Diagnosis Date Resolved POA   No resolved problems to display.

## 2018-04-19 NOTE — H&P (VIEW-ONLY)
[facility] Established Patient Evaluation    Referred by: Dr. Rosemarie Hastings  Reason for referral: Left low back pain    CC:   Chief Complaint   Patient presents with    Low-back Pain     Interval Update:    4/13/18 - Patient returns to clinic following a LEFT Sacroiliac Joint Injection done 12/14/17 with 60% relief.  She also inquires about limitations in activity specifically related to yoga and gym workouts.  She continues to have pain in the low back near the SI joint especially at night with pain going up to 8/10 and causing insomnia.    Background:  Zack Hopper is a pleasant 77 y.o. female who presented to my clinic complaining of lower back pain as characterized below.    Location: lower back   Severity: Currently: 7/10   Typical Range: 5-8/10     Exacerbation: 7/10   Onset: a few months ago  Quality: Aching  Radiation: Right posterior thigh  Axial/Extremity Percentage of Pain: 90/10  Exacerbating Factors: standing for more than 5 minutes and walking for more than 5 minutes  Mitigating Factors: ice and medications  Assoc: denies night fever/night sweats, urinary incontinence, bowel incontinence, significant weight loss, significant motor weakness and loss of sensations    Previous Therapies:  PT: Denies  HEP: Endorses daily walks  TENS:  Injections:    - 12/14/17 LEFT Sacroiliac Joint Injection - 60% relief  Surgery: Denies  Medications:    - NSAIDS: Yes, ibuprofen & meloxicam   - MSK Relaxants:    - TCAs:    - SNRIs:    - Topicals:     Current Pain Medications:  1. Tylenol   2. Ibuprofen    Full Medication List:    Current Outpatient Prescriptions:     chlorthalidone (HYGROTEN) 25 MG Tab, Take 1 tablet (25 mg total) by mouth once daily., Disp: 90 tablet, Rfl: 1    ibuprofen (ADVIL) 200 MG tablet, Take 200 mg by mouth as needed. 1 Tablet Oral .  Last taken 12/14/11, Disp: , Rfl:     omeprazole (PRILOSEC) 20 MG capsule, Take 20 mg by mouth once daily., Disp: , Rfl: 0     rosuvastatin (CRESTOR) 10 MG tablet, Take 1 tablet (10 mg total) by mouth once daily., Disp: 90 tablet, Rfl: 3    zolpidem (AMBIEN) 5 MG Tab, Take 1 tablet (5 mg total) by mouth nightly as needed., Disp: 30 tablet, Rfl: 2    acetaminophen (TYLENOL) 500 MG tablet, Take 500 mg by mouth every 6 (six) hours as needed for Pain., Disp: , Rfl:     meloxicam (MOBIC) 15 MG tablet, Take 15 mg by mouth once daily., Disp: , Rfl: 0     Review of Systems:  Review of Systems   Constitutional: Negative for chills and fever.   HENT: Negative for nosebleeds.    Eyes: Negative for pain.   Respiratory: Negative for hemoptysis.    Cardiovascular: Negative for chest pain.   Gastrointestinal: Negative for nausea and vomiting.   Genitourinary: Negative for dysuria.   Musculoskeletal: Positive for back pain.   Skin: Negative for rash.   Neurological: Negative for tremors.   Endo/Heme/Allergies: Does not bruise/bleed easily.   Psychiatric/Behavioral: Negative for suicidal ideas.       Allergies:  Sulfacetamide     Medical History:  Past Medical History:   Diagnosis Date    DJD (degenerative joint disease)     Dyslipidemia     Hypertension     Nuclear sclerosis - Both Eyes 2012    Rosacea     Vitamin D deficiency disease         Surgical History:  Past Surgical History:   Procedure Laterality Date    APPENDECTOMY      BREAST BIOPSY Left     2015    BREAST BIOPSY Right     10/2015 ex bx- papilloma     breast biopsy, left      10/2015     SECTION, CLASSIC      Three times    CHOLECYSTECTOMY      SHOULDER OPEN ROTATOR CUFF REPAIR Right     TOTAL KNEE ARTHROPLASTY      right knee        Social History:  Social History     Social History    Marital status:      Spouse name: N/A    Number of children: N/A    Years of education: N/A     Occupational History    Not on file.     Social History Main Topics    Smoking status: Never Smoker    Smokeless tobacco: Never Used    Alcohol use 0.6 oz/week     1  Glasses of wine per week      Comment: occ    Drug use: No    Sexual activity: Not on file     Other Topics Concern    Not on file     Social History Narrative    No narrative on file       Physical Exam:  Vitals:    04/13/18 0804   BP: 124/70   Weight: 77 kg (169 lb 12.1 oz)   PainSc:   7     General    Nursing note and vitals reviewed.  Constitutional: She is oriented to person, place, and time. She appears well-developed and well-nourished. No distress.   HENT:   Head: Normocephalic and atraumatic.   Nose: Nose normal.   Eyes: Conjunctivae and EOM are normal. Pupils are equal, round, and reactive to light. Right eye exhibits no discharge. Left eye exhibits no discharge. No scleral icterus.   Neck: No JVD present.   Cardiovascular: Intact distal pulses.    Pulmonary/Chest: Effort normal. No respiratory distress.   Abdominal: She exhibits no distension.   Neurological: She is alert and oriented to person, place, and time. Coordination normal.   Psychiatric: She has a normal mood and affect. Her behavior is normal. Judgment and thought content normal.     General Musculoskeletal Exam   Gait: antalgic         Right Hip Exam     Tests   Pain w/ forced internal rotation (NAKITA): absent  Left Hip Exam     Tests   Pain w/ forced internal rotation (NAKITA): present      Back (L-Spine & T-Spine) / Neck (C-Spine) Exam     Tenderness Left paramedian tenderness of the Sacrum.     Back (L-Spine & T-Spine) Range of Motion   Extension: abnormal Back extension: facet loading positive on left.         Imaging:MRI Lumbar Spine Without Contrast   [id number] 11/30/17  05:58:48 [id number] ([facility]) : MRI LUMBAR SPINE WITHOUT CONTRAST    SUPPLIED CLINICAL HISTORY:  Low back pain    TECHNIQUE:  Multiplanar, multisequence images of the lumbar spine were obtained without the use of intravenous contrast.      COMPARISON: Lumbar spine radiograph 10/12/2017     FINDINGS:    There is grade 1 anterolisthesis of L4 on L5.  Vertebral body heights are  well-maintained without signal abnormality to suggest acute fracture or infiltrative marrow placement process.  Intervertebral discs demonstrate multilevel degeneration with disc space height loss most prominent at L5-S1 and L1-L2.  Conus and lumbar spinal nerve roots are normal in signal.  Spinal cord terminates at the L1 vertebral level.  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    Limited evaluation of the surrounding soft tissues demonstrate no significant abnormalities.      T12-L1:  No focal disc abnormalities.  No significant spinal canal stenosis or neuroforaminal narrowing.    L1-2:  Circumferential disc bulge as well as mild bilateral facet hypertrophy results in mild spinal canal stenosis as well as severe right and moderate left neuroforaminal narrowing.      L2-3:  Circumferential disc bulge as well as mild bilateral facet hypertrophy results in moderate spinal canal stenosis as well as moderate bilateral neuroforaminal narrowing.      L3-4:  Circumferential disc bulge as well as moderate bilateral facet hypertrophy and ligamentum flavum thickening results in mild spinal canal stenosis as well as mild right and moderate left neuroforaminal narrowing      L4-5:  Grade 1 anterolisthesis along with moderate bilateral facet hypertrophy and ligamentum flavum thickening results in moderate spinal canal stenosis as well as mild bilateral neuroforaminal narrowing.      L5-S1:  Mild circumferential disc bulge as well as mild bilateral facet hypertrophy results in moderate right and severe left neuroforaminal narrowing.   Impression          1.  Multilevel spondylosis of the lumbar spine results in moderate spinal canal stenosis at L2-3 and L4-5 as well as severe right neuroforaminal narrowing at L1-2 and severe left neuroforaminal narrowing at L5-S1.    2.  Grade one anterolisthesis of L4 on L5.  ______________________________________     Electronically signed by resident: REFUGIO HERNANDEZ MD  Date: 11/30/17  Time:  10:59    As the supervising and teaching physician, I personally reviewed the images and resident's interpretation and I agree with the findings.    Electronically signed by: Willam Torres MD  Date: 11/30/17  Time: 15:28        X-Ray Lumbar Spine Ap Lateral w/Flex Ext  5 views of the lumbar spine were obtained, with AP, lateral, lumbosacral lateral, and lateral flexion and extension images all submitted.    Mild scoliosis convex to the right in the lumbar region is seen, as is 7.5 mm of anterolisthesis of L4 in relation to L5, the latter alignment abnormality secondary to L4-5 facet arthropathy.  Alignment at other levels appears unremarkable, and there is no evidence of significant translational instability seen on the weight- bearing flexion/extension images.  Vertebral body heights are normally maintained, without compression deformity at any level.  There is prominent disc narrowing seen at every lumbar level, the most severe degrees of narrowing being at L1-2 and L5-S1, with a vacuum phenomenon indicative of disc desiccation seen at the L5-S1 level.  Marginal vertebral endplate spurring is observed, particularly at L1-2 and L5-S1.  Facet arthropathy is seen at L5-S1, in addition to that previously mentioned at L4-5.  Vertebral endplates are well-defined.  No osseous destructive process.  Surgical clips are seen in the right upper quadrant, and calcification in the wall of the abdominal aorta, without definable aneurysm, is also incidentally observed.   Impression      Advanced multilevel degenerative changes are identified as discussed in detail above, with the disc narrowing and vertebral endplate spurring most pronounced at L1-2 and L5-S1, and with anterolisthesis seen at the L4-5 level on the basis of underlying facet arthropathy.  Although the findings are similar to the exam of 8/16/12, there has been some progression of this degenerative change since that time, particularly at the L1-2 level, which exhibits  considerably greater disc narrowing on the current exam.  No compression fracture.  No translational instability.      Electronically signed by: Willam Friedman MD  Date: 10/12/17  Time: 08:58          Labs:  BMP  Lab Results   Component Value Date     10/20/2017    K 5.0 10/20/2017     10/20/2017    CO2 27 10/20/2017    BUN 21 10/20/2017    CREATININE 1.1 10/20/2017    CALCIUM 10.0 10/20/2017    ANIONGAP 10 10/20/2017    ESTGFRAFRICA 56.8 (A) 10/20/2017    EGFRNONAA 49.2 (A) 10/20/2017     Lab Results   Component Value Date    ALT 39 10/20/2017    AST 30 10/20/2017    ALKPHOS 77 10/20/2017    BILITOT 1.0 10/20/2017       Diagnoses & Associated Orders:  Problem List Items Addressed This Visit     SI (sacroiliac) joint dysfunction    Relevant Medications    celecoxib (CELEBREX) 100 MG capsule    Other Relevant Orders    BASIC METABOLIC PANEL    Insomnia    Lumbar spondylosis    Relevant Medications    celecoxib (CELEBREX) 100 MG capsule    Myofascial pain syndrome    DDD (degenerative disc disease), lumbosacral - Primary    Relevant Medications    celecoxib (CELEBREX) 100 MG capsule    Stage 1 chronic kidney disease    Relevant Orders    BASIC METABOLIC PANEL        76 yo F with axial right lower back pain due to SI dysfunction and facet arthopathy without evidence of radiculopathy at this time.  While severe DDD and NFS may be a component of her pain, exam and HPI is most consistent with SI as the primary pain generator as this time.     4/13/18 - I strongly encouraged her to resume yoga and activity.  She previously used Celebrex for her pain and is amenable to trying it again.  Her labs show a renal function trend that is borderline normal vs CKD I and I will order a BMP today to verify that function is stable and appropriate.    Recommendations & Interventions:   Procedures: Repeat LEFT SI injection x1  Medications:    - D/c all other NSAIDS   - Start Celebrex 100 mg BID   - BMP ordered today to verify  stable renal function  Imaging: No additional at this time  PT/OT: Restart yoga and patient is amenable to PT if no improvement within 3 months  HEP: Cont walking daily as tolerated  Follow Up: RTC 2-4 weeks p inj    Ricci Lockett Jr, MD  Interventional Pain Medicine / Anesthesiology    Disclaimer: This note was partly generated using dictation software which may occasionally result in transcription errors.

## 2018-04-19 NOTE — DISCHARGE SUMMARY
[facility]  Discharge Note  Short Stay    Admit Date: 4/19/2018    Discharge Date and Time: No discharge date for patient encounter.     Attending Physician: Ricci Lockett Jr., MD     Discharge Provider: Ricci Lockett Jr    Diagnoses:  Active Hospital Problems    Diagnosis  POA    *Sacroiliac joint dysfunction [M53.3]  Yes      Resolved Hospital Problems    Diagnosis Date Resolved POA   No resolved problems to display.       Discharged Condition: fair    Hospital Course: Patient was admitted for an outpatient procedure and tolerated the procedure well with no complications.    Final Diagnoses: Same as principal problem.    Disposition: Home or Self Care    Follow up/Patient Instructions:    Medications:  Reconciled Home Medications:      Medication List      CONTINUE taking these medications    acetaminophen 500 MG tablet  Commonly known as:  TYLENOL  Take 500 mg by mouth every 6 (six) hours as needed for Pain.     celecoxib 100 MG capsule  Commonly known as:  CeleBREX  Take 1 capsule (100 mg total) by mouth 2 (two) times daily.     chlorthalidone 25 MG Tab  Commonly known as:  HYGROTEN  Take 1 tablet (25 mg total) by mouth once daily.     omeprazole 20 MG capsule  Commonly known as:  PRILOSEC  Take 20 mg by mouth once daily.     rosuvastatin 10 MG tablet  Commonly known as:  CRESTOR  Take 1 tablet (10 mg total) by mouth once daily.     zolpidem 5 MG Tab  Commonly known as:  AMBIEN  Take 1 tablet (5 mg total) by mouth nightly as needed.            Discharge Procedure Orders  Call MD for:  temperature >100.4     Call MD for:  severe uncontrolled pain     Call MD for:  redness, tenderness, or signs of infection (pain, swelling, redness, odor or green/yellow discharge around incision site)     Call MD for:  difficulty breathing or increased cough     Call MD for:  severe persistent headache     Call MD for:  worsening rash     Remove dressing in 24 hours       Follow-up Information     Go to [facility]  Cathryn Ochoa MD.    Specialty:  Pain Medicine  Why:  Post-procedural Follow Up As Scheduled, Call to make an appointment if you do not have one  Contact information:  [address]  [address]  [place] [id number] [phone]                   Discharge Procedure Orders (must include Diet, Follow-up, Activity):    Discharge Procedure Orders (must include Diet, Follow-up, Activity)  Call MD for:  temperature >100.4     Call MD for:  severe uncontrolled pain     Call MD for:  redness, tenderness, or signs of infection (pain, swelling, redness, odor or green/yellow discharge around incision site)     Call MD for:  difficulty breathing or increased cough     Call MD for:  severe persistent headache     Call MD for:  worsening rash     Remove dressing in 24 hours

## 2018-04-19 NOTE — OP NOTE
Procedure Note    Pre-operative Diagnosis: Sacroiliac joint dysfunction  Post-operative Diagnosis: Sacroiliac joint dysfunction  Procedure: (1) LEFT Sacroiliac Joint Injection and (2) Intraoperative fluoroscopy  Procedure Date: 04/19/2018      Anesthesia: Oral Sedation, Local    Indications: (1) To alleviate pain and suffering, (2) reduce functional impairment, and for (3) diagnostic purposes.    The patients history and physical exam were reviewed. The risks (local discomfort, infection, headache, temporary or permanent weakness and/or numbness of one or both legs, temporary or permanent paraplegia, heart attack and stroke), benefits and alternatives to the procedure were discussed, and all questions were answered to the patients satisfaction. The patient agreed to proceed, and written, informed consent was verified.    Procedure in Detail: Patient was brought back to procedure room and placed in a prone position and head resting comfortably in head ring. Prior to the initiation of the procedure, the patient's identity, the site, and the nature of the procedure were verified.     The skin was prepped with chloroprep and sterile drapes were applied. The Left SI joint was identified by fluoroscopy in an oblique plane. A 25g 3 1/2 inch spinal needle was advanced under intermittent fluoroscopy until joint space was entered. There was no paresthesia with needle placement. Aspiration was negative for blood. Omnipaque 0.5 mL was injected confirming appropriate position and spread into the joint without intravascualr uptake. Next, 1 mL containing 40 mg depomedrol with 1 mL of 0.5% Marcaine  was injected without difficulty or resistance.  Needle was removed with flush and bandaged placed over site of needle insertion.      EBL: nil     Disposition: The patient tolerated the procedure well, and there were no apparent complications. Vital signs remained stable throughout the procedure. The patient was taken to the recovery  area where written discharge instructions for the procedure were given.     Follow-up: RTC as scheduled      Ricci Lockett Jr, MD  Interventional Pain Medicine / Anesthesiology

## 2018-04-20 ENCOUNTER — OFFICE VISIT (OUTPATIENT)
Dept: OPHTHALMOLOGY | Facility: CLINIC | Age: 78
End: 2018-04-20
Payer: MEDICARE

## 2018-04-20 DIAGNOSIS — H25.13 NUCLEAR SCLEROSIS, BILATERAL: Primary | ICD-10-CM

## 2018-04-20 PROCEDURE — 92014 COMPRE OPH EXAM EST PT 1/>: CPT | Mod: S$GLB,,, | Performed by: OPHTHALMOLOGY

## 2018-04-20 PROCEDURE — 99999 PR PBB SHADOW E&M-EST. PATIENT-LVL II: CPT | Mod: PBBFAC,,, | Performed by: OPHTHALMOLOGY

## 2018-04-20 NOTE — PROGRESS NOTES
HPI     Cataract    Additional comments: Both Eyes.            Comments   76 y/o female presents for cataract check.  Pt states she may have noticed   a overall decline in vision OU but not severe.  Still wearing different   strengths of OTC Readers for distance and near. Occasional FBS OU.    Relieved with AT's.        Last edited by Amada Gamez on 4/20/2018  9:16 AM. (History)            Assessment /Plan     For exam results, see Encounter Report.    Nuclear sclerosis, bilateral      Incipient cataract: Patient does reports mild visual decline, but not sufficient to affect activities of daily living. I recommend monitoring visual status and follow up when visual symptoms worsen.

## 2018-04-23 ENCOUNTER — CLINICAL SUPPORT (OUTPATIENT)
Dept: INFECTIOUS DISEASES | Facility: CLINIC | Age: 78
End: 2018-04-23
Payer: MEDICARE

## 2018-04-23 ENCOUNTER — OFFICE VISIT (OUTPATIENT)
Dept: INFECTIOUS DISEASES | Facility: CLINIC | Age: 78
End: 2018-04-23
Payer: MEDICARE

## 2018-04-23 VITALS
HEART RATE: 66 BPM | BODY MASS INDEX: 29.95 KG/M2 | TEMPERATURE: 99 F | DIASTOLIC BLOOD PRESSURE: 84 MMHG | SYSTOLIC BLOOD PRESSURE: 174 MMHG | HEIGHT: 63 IN | WEIGHT: 169.06 LBS

## 2018-04-23 DIAGNOSIS — Z71.84 TRAVEL ADVICE ENCOUNTER: Primary | ICD-10-CM

## 2018-04-23 DIAGNOSIS — Z71.84 TRAVEL ADVICE ENCOUNTER: ICD-10-CM

## 2018-04-23 DIAGNOSIS — Z23 IMMUNIZATION DUE: ICD-10-CM

## 2018-04-23 PROCEDURE — 90636 HEP A/HEP B VACC ADULT IM: CPT | Mod: S$GLB,,, | Performed by: INTERNAL MEDICINE

## 2018-04-23 PROCEDURE — 99999 PR PBB SHADOW E&M-EST. PATIENT-LVL III: CPT | Mod: PBBFAC,,, | Performed by: INTERNAL MEDICINE

## 2018-04-23 PROCEDURE — 90471 IMMUNIZATION ADMIN: CPT | Mod: S$GLB,,, | Performed by: INTERNAL MEDICINE

## 2018-04-23 PROCEDURE — 99402 PREV MED CNSL INDIV APPRX 30: CPT | Mod: S$GLB,,, | Performed by: INTERNAL MEDICINE

## 2018-04-23 PROCEDURE — 90472 IMMUNIZATION ADMIN EACH ADD: CPT | Mod: S$GLB,,, | Performed by: INTERNAL MEDICINE

## 2018-04-23 PROCEDURE — 90691 TYPHOID VACCINE IM: CPT | Mod: S$GLB,,, | Performed by: INTERNAL MEDICINE

## 2018-04-23 RX ORDER — AZITHROMYCIN 500 MG/1
TABLET, FILM COATED ORAL
Qty: 2 TABLET | Refills: 0 | Status: SHIPPED | OUTPATIENT
Start: 2018-04-23 | End: 2018-05-02 | Stop reason: ALTCHOICE

## 2018-04-23 RX ORDER — ATOVAQUONE AND PROGUANIL HYDROCHLORIDE 250; 100 MG/1; MG/1
TABLET, FILM COATED ORAL
Qty: 19 TABLET | Refills: 0 | Status: SHIPPED | OUTPATIENT
Start: 2018-04-23 | End: 2018-11-13 | Stop reason: ALTCHOICE

## 2018-04-23 NOTE — PROGRESS NOTES
Subjective:      Chief Complaint:   Chief Complaint   Patient presents with    Travel Consult     [facility]      History of Present Illness    Patient  77 y.o. female who presents today for routine pretravel consultation.  The patient reports a past medical history of HTN, hyperlipidemia.  The patient reports the following medication allergies; sulfacetamide (rash).  The patient reports the following food allergies; none.  The patient will be traveling to [place] on September 21.  The patient will be at this destination for 10 days.  She will fly into [facility] (for 1 night).  Then they will go to [facility].  They will spend a few days there.  Then they will spend the rest of the trip in [facility].  The patient will be lodging at a hotels and lodes.  The patient has travelled to the following other countries in the past; [place], [place], [place].  The patient reports that they received all their childhood vaccinations.  She is sure that she had the measles, mumps and rubella vaccines.  The patient reports receipt of the following travel related vaccinations; none.  The purpose of this trip is vacation.      ROS    Objective:   Physical Exam   Assessment:     Pre-Travel clinic assessment    Plan:   Patient specific risks:      Patient was advised to see her primary care physician prior to her trip to optimize her blood pressure medications.  Advised her to take over the counter medications that she uses for her hip pain.    Destination specific risks:      -Infectious Disease risks:       Mosquito Borne pathogens:  Reviewed basic mosquito avoidance precautions including wearing long sleeve clothing and insect repellant.  Malarone prescribed for malaria prevention.       Food Borne pathogens:  Reviewed basic hand, food and water sanitation precautions.  Patient instructed to take hand  on their trip.  Will give typhoid and hepatitis a vaccine today.  Azithromycin as needed  for severe diarrhea.     Blood Borne Pathogens:  Will give combined hepatitis a/b vaccination series.     Routine:  Advised her to get the new shingles vaccine from her pharmacy.  She received Td in 2009.    -Environmental risks:     Precautions to minimize risk/exposure to crime and motor vehicle accidents were reviewed with the patient.

## 2018-04-24 ENCOUNTER — TELEPHONE (OUTPATIENT)
Dept: INTERNAL MEDICINE | Facility: CLINIC | Age: 78
End: 2018-04-24

## 2018-04-24 NOTE — TELEPHONE ENCOUNTER
----- Message from Olga Lidia Merida sent at 4/24/2018  3:08 PM CDT -----  Contact: pt [phone] or [phone]  Pt would like to know if she should be seen by Dr. Perez before going out of the country to [place] in September to make sure her health is good or should she just wait to be seen for her physical at the end of October. She has been seen by infectious disease for some immunizations this past week. Please advise.

## 2018-04-25 NOTE — TELEPHONE ENCOUNTER
Spoke to pt. Pt advised that she will need an office visit if she is not feeling well, or if rxs are needed.    Pt inquiring about recent dx of CKD, 1.   I advised of requirements for Dx. Pt will discuss further with Dr. Lockett.

## 2018-05-02 ENCOUNTER — OFFICE VISIT (OUTPATIENT)
Dept: OPTOMETRY | Facility: CLINIC | Age: 78
End: 2018-05-02
Payer: COMMERCIAL

## 2018-05-02 DIAGNOSIS — H52.4 HYPEROPIA WITH PRESBYOPIA OF BOTH EYES: Primary | ICD-10-CM

## 2018-05-02 DIAGNOSIS — H52.03 HYPEROPIA WITH PRESBYOPIA OF BOTH EYES: Primary | ICD-10-CM

## 2018-05-02 PROCEDURE — 92012 INTRM OPH EXAM EST PATIENT: CPT | Mod: S$GLB,,, | Performed by: OPTOMETRIST

## 2018-05-02 PROCEDURE — 92015 DETERMINE REFRACTIVE STATE: CPT | Mod: S$GLB,,, | Performed by: OPTOMETRIST

## 2018-05-02 PROCEDURE — 99999 PR PBB SHADOW E&M-EST. PATIENT-LVL II: CPT | Mod: PBBFAC,,, | Performed by: OPTOMETRIST

## 2018-05-02 RX ORDER — ZOSTER VACCINE RECOMBINANT, ADJUVANTED 50 MCG/0.5
KIT INTRAMUSCULAR
Refills: 0 | COMMUNITY
Start: 2018-04-23 | End: 2019-05-22 | Stop reason: ALTCHOICE

## 2018-05-02 NOTE — PROGRESS NOTES
HPI     DLS: 4/20/2018 with Dr. Lopez  Pt states she may have noticed a overall decline in vision OU but not   severe. Wear +4.00 otc readers for near and +2.50 for distance. Wishes Rx   for sunglasses    Refresh ou PRN    CAT OU      Last edited by Parker Hathaway, OD on 5/2/2018  2:03 PM. (History)        ROS     Negative for: Constitutional, Gastrointestinal, Neurological, Skin,   Genitourinary, Musculoskeletal, HENT, Endocrine, Cardiovascular, Eyes,   Respiratory, Psychiatric, Allergic/Imm, Heme/Lymph    Last edited by Parker Hathaway, OD on 5/2/2018  2:03 PM. (History)        Assessment /Plan     For exam results, see Encounter Report.    Hyperopia with presbyopia of both eyes      1. Cat OU--saw Dr Lopez last month--not ready for surgery  2. Pt wears otc for dist and near, but wishes Rx for prescription sunglasses    PLAN:    rtc as sched w Dr Lopez for cat evals

## 2018-05-07 RX ORDER — ZOLPIDEM TARTRATE 5 MG/1
5 TABLET ORAL NIGHTLY PRN
Qty: 30 TABLET | Refills: 0 | Status: SHIPPED | OUTPATIENT
Start: 2018-05-07 | End: 2018-07-09 | Stop reason: SDUPTHER

## 2018-05-16 NOTE — PROGRESS NOTES
[facility] Established Patient Evaluation    Referred by: Dr. Rosemarie Hastings  Reason for referral: Left low back pain    CC:   Chief Complaint   Patient presents with    Hip Pain     Interval Update:    5/18/18 - Patient returns to clinic following a LEFT Sacroiliac Joint Injection  Done 4/19/18 with 97% relief.  Her current pain score is 0/10.  She restarted yoga as discussed at our last visit; however, that is temporarily on hold pending a home renovation.    4/13/18 - Patient returns to clinic following a LEFT Sacroiliac Joint Injection done 12/14/17 with 60% relief.  She also inquires about limitations in activity specifically related to yoga and gym workouts.  She continues to have pain in the low back near the SI joint especially at night with pain going up to 8/10 and causing insomnia.    Background:  Zack Hopper is a pleasant 77 y.o. female who presented to my clinic complaining of lower back pain as characterized below.    Location: lower back   Severity: Currently: 7/10   Typical Range: 5-8/10     Exacerbation: 7/10   Onset: a few months ago  Quality: Aching  Radiation: Right posterior thigh  Axial/Extremity Percentage of Pain: 90/10  Exacerbating Factors: standing for more than 5 minutes and walking for more than 5 minutes  Mitigating Factors: ice and medications  Assoc: denies night fever/night sweats, urinary incontinence, bowel incontinence, significant weight loss, significant motor weakness and loss of sensations    Previous Therapies:  PT: Denies  HEP: Endorses daily walks  TENS:  Injections:    - 12/14/17 LEFT Sacroiliac Joint Injection - 60% relief  Surgery: Denies  Medications:    - NSAIDS: Yes, ibuprofen & meloxicam   - MSK Relaxants:    - TCAs:    - SNRIs:    - Topicals:     Current Pain Medications:  1. Tylenol   2. Ibuprofen    Full Medication List:    Current Outpatient Prescriptions:     acetaminophen (TYLENOL) 500 MG tablet, Take 500 mg by mouth every 6 (six)  hours as needed for Pain., Disp: , Rfl:     atovaquone-proguanil (MALARONE) 250-100 mg Tab, Take one tablet daily for malaria prevention. Begin one day before entering malarious area and continue for 1 week after return., Disp: 19 tablet, Rfl: 0    celecoxib (CELEBREX) 100 MG capsule, Take 1 capsule (100 mg total) by mouth 2 (two) times daily., Disp: 60 capsule, Rfl: 1    chlorthalidone (HYGROTEN) 25 MG Tab, Take 1 tablet (25 mg total) by mouth once daily., Disp: 90 tablet, Rfl: 1    omeprazole (PRILOSEC) 20 MG capsule, Take 20 mg by mouth once daily., Disp: , Rfl: 0    rosuvastatin (CRESTOR) 10 MG tablet, Take 1 tablet (10 mg total) by mouth once daily., Disp: 90 tablet, Rfl: 3    SHINGRIX, PF, 50 mcg/0.5 mL injection, inject 0.5 milliliter intramuscularly, Disp: , Rfl: 0    zolpidem (AMBIEN) 5 MG Tab, Take 1 tablet (5 mg total) by mouth nightly as needed., Disp: 30 tablet, Rfl: 0     Review of Systems:  Review of Systems   Constitutional: Negative for chills and fever.   HENT: Negative for nosebleeds.    Eyes: Negative for pain.   Respiratory: Negative for hemoptysis.    Cardiovascular: Negative for chest pain.   Gastrointestinal: Negative for nausea and vomiting.   Genitourinary: Negative for dysuria.   Musculoskeletal: Positive for back pain.   Skin: Negative for rash.   Neurological: Negative for tremors.   Endo/Heme/Allergies: Does not bruise/bleed easily.   Psychiatric/Behavioral: Negative for suicidal ideas.       Allergies:  Sulfacetamide     Medical History:  Past Medical History:   Diagnosis Date    DJD (degenerative joint disease)     Dyslipidemia     Hypertension     Nuclear sclerosis - Both Eyes 2012    Rosacea     Vitamin D deficiency disease         Surgical History:  Past Surgical History:   Procedure Laterality Date    APPENDECTOMY      BREAST BIOPSY Left     2015    BREAST BIOPSY Right     10/2015 ex bx- papilloma     breast biopsy, left      10/2015     SECTION,  CLASSIC      Three times    CHOLECYSTECTOMY      SHOULDER OPEN ROTATOR CUFF REPAIR Right     TOTAL KNEE ARTHROPLASTY      right knee        Social History:  Social History     Social History    Marital status:      Spouse name: N/A    Number of children: N/A    Years of education: N/A     Occupational History    Not on file.     Social History Main Topics    Smoking status: Never Smoker    Smokeless tobacco: Never Used    Alcohol use 0.6 oz/week     1 Glasses of wine per week      Comment: occ    Drug use: No    Sexual activity: Not on file     Other Topics Concern    Not on file     Social History Narrative    No narrative on file       Physical Exam:  Vitals:    05/18/18 0818   BP: 118/74   Pulse: 68   Weight: 72.6 kg (160 lb)   PainSc: 0-No pain     General    Nursing note and vitals reviewed.  Constitutional: She is oriented to person, place, and time. She appears well-developed and well-nourished. No distress.   HENT:   Head: Normocephalic and atraumatic.   Nose: Nose normal.   Eyes: Conjunctivae and EOM are normal. Pupils are equal, round, and reactive to light. Right eye exhibits no discharge. Left eye exhibits no discharge. No scleral icterus.   Neck: No JVD present.   Cardiovascular: Intact distal pulses.    Pulmonary/Chest: Effort normal. No respiratory distress.   Abdominal: She exhibits no distension.   Neurological: She is alert and oriented to person, place, and time. Coordination normal.   Psychiatric: She has a normal mood and affect. Her behavior is normal. Judgment and thought content normal.     General Musculoskeletal Exam   Gait: antalgic         Right Hip Exam     Tests   Pain w/ forced internal rotation (NAKITA): absent  Left Hip Exam     Tests   Pain w/ forced internal rotation (NAKITA): present      Back (L-Spine & T-Spine) / Neck (C-Spine) Exam     Tenderness Left paramedian tenderness of the Sacrum.     Back (L-Spine & T-Spine) Range of Motion   Extension: abnormal Back  extension: facet loading positive on left.         Imaging:MRI Lumbar Spine Without Contrast   [id number] 11/30/17  05:58:48 [id number] ([facility]) : MRI LUMBAR SPINE WITHOUT CONTRAST    SUPPLIED CLINICAL HISTORY:  Low back pain    TECHNIQUE:  Multiplanar, multisequence images of the lumbar spine were obtained without the use of intravenous contrast.      COMPARISON: Lumbar spine radiograph 10/12/2017     FINDINGS:    There is grade 1 anterolisthesis of L4 on L5.  Vertebral body heights are well-maintained without signal abnormality to suggest acute fracture or infiltrative marrow placement process.  Intervertebral discs demonstrate multilevel degeneration with disc space height loss most prominent at L5-S1 and L1-L2.  Conus and lumbar spinal nerve roots are normal in signal.  Spinal cord terminates at the L1 vertebral level.  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    Limited evaluation of the surrounding soft tissues demonstrate no significant abnormalities.      T12-L1:  No focal disc abnormalities.  No significant spinal canal stenosis or neuroforaminal narrowing.    L1-2:  Circumferential disc bulge as well as mild bilateral facet hypertrophy results in mild spinal canal stenosis as well as severe right and moderate left neuroforaminal narrowing.      L2-3:  Circumferential disc bulge as well as mild bilateral facet hypertrophy results in moderate spinal canal stenosis as well as moderate bilateral neuroforaminal narrowing.      L3-4:  Circumferential disc bulge as well as moderate bilateral facet hypertrophy and ligamentum flavum thickening results in mild spinal canal stenosis as well as mild right and moderate left neuroforaminal narrowing      L4-5:  Grade 1 anterolisthesis along with moderate bilateral facet hypertrophy and ligamentum flavum thickening results in moderate spinal canal stenosis as well as mild bilateral neuroforaminal narrowing.      L5-S1:  Mild circumferential disc bulge as well as mild bilateral facet  hypertrophy results in moderate right and severe left neuroforaminal narrowing.   Impression    1.  Multilevel spondylosis of the lumbar spine results in moderate spinal canal stenosis at L2-3 and L4-5 as well as severe right neuroforaminal narrowing at L1-2 and severe left neuroforaminal narrowing at L5-S1.    2.  Grade one anterolisthesis of L4 on L5.     X-Ray Lumbar Spine Ap Lateral w/Flex Ext  5 views of the lumbar spine were obtained, with AP, lateral, lumbosacral lateral, and lateral flexion and extension images all submitted.    Mild scoliosis convex to the right in the lumbar region is seen, as is 7.5 mm of anterolisthesis of L4 in relation to L5, the latter alignment abnormality secondary to L4-5 facet arthropathy.  Alignment at other levels appears unremarkable, and there is no evidence of significant translational instability seen on the weight- bearing flexion/extension images.  Vertebral body heights are normally maintained, without compression deformity at any level.  There is prominent disc narrowing seen at every lumbar level, the most severe degrees of narrowing being at L1-2 and L5-S1, with a vacuum phenomenon indicative of disc desiccation seen at the L5-S1 level.  Marginal vertebral endplate spurring is observed, particularly at L1-2 and L5-S1.  Facet arthropathy is seen at L5-S1, in addition to that previously mentioned at L4-5.  Vertebral endplates are well-defined.  No osseous destructive process.  Surgical clips are seen in the right upper quadrant, and calcification in the wall of the abdominal aorta, without definable aneurysm, is also incidentally observed.   Impression    Advanced multilevel degenerative changes are identified as discussed in detail above, with the disc narrowing and vertebral endplate spurring most pronounced at L1-2 and L5-S1, and with anterolisthesis seen at the L4-5 level on the basis of underlying facet arthropathy.  Although the findings are similar to the exam of  8/16/12, there has been some progression of this degenerative change since that time, particularly at the L1-2 level, which exhibits considerably greater disc narrowing on the current exam.  No compression fracture.  No translational instability.     Labs:  BMP  Lab Results   Component Value Date     04/13/2018    K 3.3 (L) 04/13/2018     04/13/2018    CO2 31 (H) 04/13/2018    BUN 25 (H) 04/13/2018    CREATININE 1.0 04/13/2018    CALCIUM 9.5 04/13/2018    ANIONGAP 7 (L) 04/13/2018    ESTGFRAFRICA >60 04/13/2018    EGFRNONAA 55 (A) 04/13/2018     Lab Results   Component Value Date    ALT 39 10/20/2017    AST 30 10/20/2017    ALKPHOS 77 10/20/2017    BILITOT 1.0 10/20/2017       Diagnoses & Associated Orders:  Problem List Items Addressed This Visit     Lumbar spondylosis    Myofascial pain syndrome    Stage 1 chronic kidney disease    Sacroiliac joint dysfunction - Primary        76 yo F with axial right lower back pain due to SI dysfunction and facet arthopathy without evidence of radiculopathy at this time.  While severe DDD and NFS may be a component of her pain, exam and HPI is most consistent with SI as the primary pain generator as this time.     4/13/18 - I strongly encouraged her to resume yoga and activity.  She previously used Celebrex for her pain and is amenable to trying it again.  Her labs show a renal function trend that is borderline normal vs CKD I and I will order a BMP today to verify that function is stable and appropriate.    Recommendations & Interventions:   Procedures: May repeat LEFT SI injection x1  Medications:    - Cont Celebrex 100 mg BID   - I plan to reorder BMP every 6 months to assess renal fxn to ensure that it remains stable while on NSAID therapy  Imaging: No additional at this time  PT/OT: Restart yoga and patient is amenable to PT if no improvement within 3 months  HEP: Cont walking daily as tolerated  Follow Up: RTC 2 months    Ricci Lockett Jr,  MD  Interventional Pain Medicine / Anesthesiology    Disclaimer: This note was partly generated using dictation software which may occasionally result in transcription errors.

## 2018-05-18 ENCOUNTER — OFFICE VISIT (OUTPATIENT)
Dept: PAIN MEDICINE | Facility: CLINIC | Age: 78
End: 2018-05-18
Payer: MEDICARE

## 2018-05-18 VITALS
DIASTOLIC BLOOD PRESSURE: 74 MMHG | SYSTOLIC BLOOD PRESSURE: 118 MMHG | BODY MASS INDEX: 28.34 KG/M2 | WEIGHT: 160 LBS | HEART RATE: 68 BPM

## 2018-05-18 DIAGNOSIS — M53.3 SACROILIAC JOINT DYSFUNCTION: Primary | ICD-10-CM

## 2018-05-18 DIAGNOSIS — M53.3 SI (SACROILIAC) JOINT DYSFUNCTION: ICD-10-CM

## 2018-05-18 DIAGNOSIS — M79.18 MYOFASCIAL PAIN SYNDROME: ICD-10-CM

## 2018-05-18 DIAGNOSIS — M47.816 LUMBAR SPONDYLOSIS: ICD-10-CM

## 2018-05-18 DIAGNOSIS — M51.37 DDD (DEGENERATIVE DISC DISEASE), LUMBOSACRAL: ICD-10-CM

## 2018-05-18 DIAGNOSIS — N18.1 STAGE 1 CHRONIC KIDNEY DISEASE: ICD-10-CM

## 2018-05-18 PROCEDURE — 3074F SYST BP LT 130 MM HG: CPT | Mod: CPTII,S$GLB,, | Performed by: PAIN MEDICINE

## 2018-05-18 PROCEDURE — 99999 PR PBB SHADOW E&M-EST. PATIENT-LVL II: CPT | Mod: PBBFAC,,, | Performed by: PAIN MEDICINE

## 2018-05-18 PROCEDURE — 99213 OFFICE O/P EST LOW 20 MIN: CPT | Mod: S$GLB,,, | Performed by: PAIN MEDICINE

## 2018-05-18 PROCEDURE — 3078F DIAST BP <80 MM HG: CPT | Mod: CPTII,S$GLB,, | Performed by: PAIN MEDICINE

## 2018-05-18 RX ORDER — CELECOXIB 100 MG/1
100 CAPSULE ORAL 2 TIMES DAILY
Qty: 60 CAPSULE | Refills: 1 | Status: SHIPPED | OUTPATIENT
Start: 2018-05-18 | End: 2019-01-11 | Stop reason: SDUPTHER

## 2018-05-24 ENCOUNTER — CLINICAL SUPPORT (OUTPATIENT)
Dept: INFECTIOUS DISEASES | Facility: CLINIC | Age: 78
End: 2018-05-24
Payer: MEDICARE

## 2018-05-24 DIAGNOSIS — Z23 IMMUNIZATION DUE: ICD-10-CM

## 2018-05-24 DIAGNOSIS — Z71.84 TRAVEL ADVICE ENCOUNTER: ICD-10-CM

## 2018-05-24 PROCEDURE — 90636 HEP A/HEP B VACC ADULT IM: CPT | Mod: S$GLB,,, | Performed by: INTERNAL MEDICINE

## 2018-05-24 PROCEDURE — 90471 IMMUNIZATION ADMIN: CPT | Mod: S$GLB,,, | Performed by: INTERNAL MEDICINE

## 2018-05-24 NOTE — PROGRESS NOTES
Pt received the second dose of her Hepatitis A/B vaccination. Pt tolerated the injection well. Yellow travel card updated. Pt left the unit in NAD.

## 2018-06-01 ENCOUNTER — TELEPHONE (OUTPATIENT)
Dept: INTERNAL MEDICINE | Facility: CLINIC | Age: 78
End: 2018-06-01

## 2018-06-01 NOTE — TELEPHONE ENCOUNTER
Spoke to pt. Pt stated that she does drink tea in the afternoon hours. She stated that she also watch TV in bed.  Pt advised to not drink tea in the afternoon time and not watch TV in bed. Advised to sleep in dark, quiet room.   Pt stated that she will try recommendations.

## 2018-06-01 NOTE — TELEPHONE ENCOUNTER
Please advise pt that the 10mg is not recommended for women over the age of 65    Has she stopped caffeine after 12 noon ? \   coffee, tea, sodas, or chocoalte----     Does she take any decongestants?    Does she watch TV or read in bed, ?    Sleep area should be cool, quiet, and w/o any light that might stimulate her

## 2018-06-01 NOTE — TELEPHONE ENCOUNTER
Pt is considered high risk for females of 65 years of age. Insurance may not cover.  Please advise.

## 2018-06-01 NOTE — TELEPHONE ENCOUNTER
----- Message from Zeb Hobbs sent at 6/1/2018 12:56 PM CDT -----  Contact: [phone]  Patient is returning a phone call.  Who left a message for the patient: Emeli  Does patient know what this is regarding:  Unknown  Comments:

## 2018-06-01 NOTE — TELEPHONE ENCOUNTER
----- Message from Awilda Mayer sent at 6/1/2018  9:09 AM CDT -----  Contact:   Call Mobile  [phone]  Patient want to speak with jessica about discussing with Dr. Perez to change her zolpidem (AMBIEN) 5 MG Tab. It's not working at the 5 mg. Want to increased to 10 mg. She will be going out of town on Monday.      [facility]    [phone]

## 2018-07-06 RX ORDER — ZOLPIDEM TARTRATE 5 MG/1
5 TABLET ORAL NIGHTLY PRN
Qty: 30 TABLET | Refills: 0 | OUTPATIENT
Start: 2018-07-06

## 2018-07-08 NOTE — PROGRESS NOTES
CC: f/up HTN, HLD, aortic atherosclerosis, CKD II  And insomnia    78 y.o. female presents for HTN  Tx : chlorthalidone 25mg   Denied HA or dizziness  + hypokalemia  + fatigue  BP today==>138/80      GERD/tx PPI- prn  Pt is asx, ENT rec restart PPI @ o/v    HLD/aortic atherosclerosis, tx rosuvastatin 10mg  Denied angina or SOB  LDL==>91 ( 10/2017)    Insomnia, tx Ambien  Denied oversedation or fatigue   Taking sparingly , able to skip some nights      MEDCARD: Reviewed  ROS:  No fever, chills ,or night sweats  No chest pain or palpitations  No focal deficits or paresthesia  Remainder of review negative except as previously noted    PMHX: Reviewed  SHX: Reviewed  FHX: Reviewed    PE:  VSS:  GEN: WDWN, A&O, NAD, conversant and co-operative; pleasant as always    EYES: Conj/lids unremarkable, sclera anicteric  ENT: Canals w/ some cerumen, right TM - injected , left unremarkable  Nasal mucosa/turbinates, w/o exudate or edema  O/P injected w/o exudate or edema; sinus NT  NECK: Supple w/o LN or TM  RESP: efforts unlabored, lungs CTA  CV: Heart RRR, no MGR, no carotid bruits noted  GI: BS + , soft, NT/ND, - HSM noted  MSK: Gait normal. No CCE  NEURO: BALES. No tremor or facial asymmetry  SKIN: Warm and dry    IMPRESSION:  Insomnia, chronic , intermittent  HTN, asx  Hypokalemia, asx- ? fatigue  CKD, III, asx  HLD, asx  Aortic atherosclerosis,asx  Aorta, ascending- ULN    PLAN:  US aorta  Continue present meds  Rx update  Lab- CMP and Mg  Resume low salt diet  Resume exercise  Call prn  RTC 3mos

## 2018-07-09 ENCOUNTER — LAB VISIT (OUTPATIENT)
Dept: LAB | Facility: HOSPITAL | Age: 78
End: 2018-07-09
Attending: INTERNAL MEDICINE
Payer: MEDICARE

## 2018-07-09 ENCOUNTER — OFFICE VISIT (OUTPATIENT)
Dept: INTERNAL MEDICINE | Facility: CLINIC | Age: 78
End: 2018-07-09
Payer: MEDICARE

## 2018-07-09 ENCOUNTER — TELEPHONE (OUTPATIENT)
Dept: INTERNAL MEDICINE | Facility: CLINIC | Age: 78
End: 2018-07-09

## 2018-07-09 VITALS
WEIGHT: 161.81 LBS | SYSTOLIC BLOOD PRESSURE: 138 MMHG | BODY MASS INDEX: 28.67 KG/M2 | HEIGHT: 63 IN | DIASTOLIC BLOOD PRESSURE: 80 MMHG | HEART RATE: 67 BPM | RESPIRATION RATE: 16 BRPM | TEMPERATURE: 98 F | OXYGEN SATURATION: 99 %

## 2018-07-09 DIAGNOSIS — N18.30 CKD (CHRONIC KIDNEY DISEASE), STAGE III: ICD-10-CM

## 2018-07-09 DIAGNOSIS — I70.0 ATHEROSCLEROSIS OF AORTA: ICD-10-CM

## 2018-07-09 DIAGNOSIS — E78.5 HYPERLIPIDEMIA, UNSPECIFIED HYPERLIPIDEMIA TYPE: ICD-10-CM

## 2018-07-09 DIAGNOSIS — E87.6 HYPOKALEMIA: ICD-10-CM

## 2018-07-09 DIAGNOSIS — G47.01 INSOMNIA DUE TO MEDICAL CONDITION: Primary | ICD-10-CM

## 2018-07-09 DIAGNOSIS — N18.1 CKD (CHRONIC KIDNEY DISEASE), SYMPTOM MANAGEMENT ONLY, STAGE 1: ICD-10-CM

## 2018-07-09 DIAGNOSIS — I10 ESSENTIAL HYPERTENSION: ICD-10-CM

## 2018-07-09 DIAGNOSIS — I10 ESSENTIAL HYPERTENSION, BENIGN: Primary | ICD-10-CM

## 2018-07-09 LAB
ALBUMIN SERPL BCP-MCNC: 4.4 G/DL
ALP SERPL-CCNC: 73 U/L
ALT SERPL W/O P-5'-P-CCNC: 30 U/L
ANION GAP SERPL CALC-SCNC: 8 MMOL/L
AST SERPL-CCNC: 23 U/L
BILIRUB SERPL-MCNC: 1.7 MG/DL
BUN SERPL-MCNC: 19 MG/DL
CALCIUM SERPL-MCNC: 10.2 MG/DL
CHLORIDE SERPL-SCNC: 100 MMOL/L
CO2 SERPL-SCNC: 30 MMOL/L
CREAT SERPL-MCNC: 1 MG/DL
EST. GFR  (AFRICAN AMERICAN): >60 ML/MIN/1.73 M^2
EST. GFR  (NON AFRICAN AMERICAN): 54.9 ML/MIN/1.73 M^2
GLUCOSE SERPL-MCNC: 120 MG/DL
MAGNESIUM SERPL-MCNC: 2.4 MG/DL
POTASSIUM SERPL-SCNC: 4 MMOL/L
PROT SERPL-MCNC: 7.7 G/DL
SODIUM SERPL-SCNC: 138 MMOL/L

## 2018-07-09 PROCEDURE — 3075F SYST BP GE 130 - 139MM HG: CPT | Mod: CPTII,S$GLB,, | Performed by: INTERNAL MEDICINE

## 2018-07-09 PROCEDURE — 36415 COLL VENOUS BLD VENIPUNCTURE: CPT | Mod: PO

## 2018-07-09 PROCEDURE — 83735 ASSAY OF MAGNESIUM: CPT

## 2018-07-09 PROCEDURE — 99999 PR PBB SHADOW E&M-EST. PATIENT-LVL III: CPT | Mod: PBBFAC,,, | Performed by: INTERNAL MEDICINE

## 2018-07-09 PROCEDURE — 3079F DIAST BP 80-89 MM HG: CPT | Mod: CPTII,S$GLB,, | Performed by: INTERNAL MEDICINE

## 2018-07-09 PROCEDURE — 80053 COMPREHEN METABOLIC PANEL: CPT

## 2018-07-09 PROCEDURE — 99214 OFFICE O/P EST MOD 30 MIN: CPT | Mod: S$GLB,,, | Performed by: INTERNAL MEDICINE

## 2018-07-09 RX ORDER — ZOLPIDEM TARTRATE 5 MG/1
5 TABLET ORAL NIGHTLY PRN
Qty: 30 TABLET | Refills: 2 | Status: SHIPPED | OUTPATIENT
Start: 2018-07-09 | End: 2018-10-12 | Stop reason: SDUPTHER

## 2018-07-09 NOTE — TELEPHONE ENCOUNTER
----- Message from Jennifer Garcia sent at 7/9/2018  2:01 PM CDT -----  Contact: Patient - [phone]  Doctor appointment and lab have been scheduled.  Please link lab orders to the lab appointment.  Date of doctor appointment:    Physical or EP:  Physical: 11/13/2018  Date of lab appointment:  Labs: 11/07/2018  Comments:     Thanks Saira

## 2018-07-11 ENCOUNTER — TELEPHONE (OUTPATIENT)
Dept: INTERNAL MEDICINE | Facility: CLINIC | Age: 78
End: 2018-07-11

## 2018-07-11 NOTE — TELEPHONE ENCOUNTER
----- Message from Rosemarie Brooks MD sent at 7/10/2018  7:37 PM CDT -----  Please note that your potassium has normalized, your kidney function has remained stable.  Please do not hesitate to call/email if you have any questions or concerns  Rosemarie Perez

## 2018-07-13 ENCOUNTER — OFFICE VISIT (OUTPATIENT)
Dept: INTERNAL MEDICINE | Facility: CLINIC | Age: 78
End: 2018-07-13
Payer: MEDICARE

## 2018-07-13 VITALS
BODY MASS INDEX: 29.21 KG/M2 | DIASTOLIC BLOOD PRESSURE: 78 MMHG | HEIGHT: 63 IN | HEART RATE: 60 BPM | WEIGHT: 164.88 LBS | SYSTOLIC BLOOD PRESSURE: 118 MMHG

## 2018-07-13 DIAGNOSIS — M47.816 LUMBAR FACET ARTHROPATHY: ICD-10-CM

## 2018-07-13 DIAGNOSIS — Z00.00 ENCOUNTER FOR PREVENTIVE HEALTH EXAMINATION: Primary | ICD-10-CM

## 2018-07-13 DIAGNOSIS — E78.5 HYPERLIPIDEMIA, UNSPECIFIED HYPERLIPIDEMIA TYPE: ICD-10-CM

## 2018-07-13 DIAGNOSIS — K21.9 GASTROESOPHAGEAL REFLUX DISEASE WITHOUT ESOPHAGITIS: ICD-10-CM

## 2018-07-13 DIAGNOSIS — M47.816 LUMBAR SPONDYLOSIS: ICD-10-CM

## 2018-07-13 DIAGNOSIS — M79.18 MYOFASCIAL PAIN SYNDROME: ICD-10-CM

## 2018-07-13 DIAGNOSIS — I70.0 ATHEROSCLEROSIS OF AORTA: ICD-10-CM

## 2018-07-13 DIAGNOSIS — M53.3 SACROILIAC JOINT DYSFUNCTION: ICD-10-CM

## 2018-07-13 DIAGNOSIS — N18.30 CKD (CHRONIC KIDNEY DISEASE), STAGE III: ICD-10-CM

## 2018-07-13 DIAGNOSIS — I10 ESSENTIAL HYPERTENSION: ICD-10-CM

## 2018-07-13 DIAGNOSIS — M48.07 SPINAL STENOSIS OF LUMBOSACRAL REGION: ICD-10-CM

## 2018-07-13 DIAGNOSIS — M47.812 ARTHROPATHY OF CERVICAL FACET JOINT: ICD-10-CM

## 2018-07-13 PROCEDURE — G0439 PPPS, SUBSEQ VISIT: HCPCS | Mod: S$GLB,,, | Performed by: NURSE PRACTITIONER

## 2018-07-13 PROCEDURE — 3078F DIAST BP <80 MM HG: CPT | Mod: CPTII,S$GLB,, | Performed by: NURSE PRACTITIONER

## 2018-07-13 PROCEDURE — 3074F SYST BP LT 130 MM HG: CPT | Mod: CPTII,S$GLB,, | Performed by: NURSE PRACTITIONER

## 2018-07-13 PROCEDURE — 99499 UNLISTED E&M SERVICE: CPT | Mod: HCNC,S$GLB,, | Performed by: NURSE PRACTITIONER

## 2018-07-13 PROCEDURE — 99999 PR PBB SHADOW E&M-EST. PATIENT-LVL IV: CPT | Mod: PBBFAC,,, | Performed by: NURSE PRACTITIONER

## 2018-07-13 RX ORDER — CHOLECALCIFEROL (VITAMIN D3) 25 MCG
1000 TABLET ORAL DAILY
COMMUNITY

## 2018-07-13 NOTE — PATIENT INSTRUCTIONS
Counseling and Referral of Other Preventative  (Italic type indicates deductible and co-insurance are waived)    Patient Name: Zack Hopper  Today's Date: 7/13/2018    Health Maintenance       Date Due Completion Date    Influenza Vaccine 08/01/2018 10/27/2017 (Done)    Override on 10/27/2017: Done    Override on 11/21/2016: Done    Override on 10/23/2015: Done (10/2015 @ [facility])    DEXA SCAN 09/14/2018 9/14/2015    Override on 4/12/2011: Done    TETANUS VACCINE 02/09/2019 2/9/2009    Override on 2/1/2009: Done    Lipid Panel 10/20/2022 10/20/2017        No orders of the defined types were placed in this encounter.    The following information is provided to all patients.  This information is to help you find resources for any of the problems found today that may be affecting your health:                Living healthy guide: www.[facility].[place]      Understanding Diabetes: www.diabetes.org      Eating healthy: www.cdc.gov/healthyweight      CDC home safety checklist: www.cdc.gov/steadi/patient.html      Agency on Aging: www.goea.[place]      Alcoholics anonymous (AA): www.aa.org      Physical Activity: www.remberto.nih.gov/ju1ynmt      Tobacco use: www.quitwithusla.org       Counseling and Referral of Other Preventative  (Italic type indicates deductible and co-insurance are waived)    Patient Name: Zack Hopper  Today's Date: 7/13/2018    Health Maintenance       Date Due Completion Date    Influenza Vaccine 08/01/2018 10/27/2017 (Done)    Override on 10/27/2017: Done    Override on 11/21/2016: Done    Override on 10/23/2015: Done (10/2015 @ [facility])    DEXA SCAN 09/14/2018 9/14/2015    Override on 4/12/2011: Done    TETANUS VACCINE 02/09/2019 2/9/2009    Override on 2/1/2009: Done    Lipid Panel 10/20/2022 10/20/2017        No orders of the defined types were placed in this encounter.    The following information is provided to all patients.  This information is to help you find resources for any of  the problems found today that may be affecting your health:                Living healthy guide: www.[facility].[place].[facility]      Understanding Diabetes: www.diabetes.org      Eating healthy: www.cdc.gov/healthyweight      CDC home safety checklist: www.cdc.gov/steadi/patient.html      Agency on Aging: www.goea.[place]      Alcoholics anonymous (AA): www.aa.org      Physical Activity: www.remberto.nih.gov/ve9iaij      Tobacco use: www.quitwithusla.org

## 2018-07-13 NOTE — PROGRESS NOTES
I offered to discuss end of life issues, including information on how to make advance directives that the patient could use to name someone who would make medical decisions on their behalf if they became too ill to make themselves.    ___Patient declined  _X_Patient is interested, I provided paper work and offered to discuss. Reports she has Advanced Directives - advised to bring to appointment to be scanned into EPIC.

## 2018-07-13 NOTE — PROGRESS NOTES
"Zack Hopper presented for a  Medicare AWV and comprehensive Health Risk Assessment today. The following components were reviewed and updated:    · Medical history  · Family History  · Social history  · Allergies and Current Medications  · Health Risk Assessment  · Health Maintenance  · Care Team     ** See Completed Assessments for Annual Wellness Visit within the encounter summary.**       The following assessments were completed:  · Living Situation  · CAGE  · Depression Screening  · Timed Get Up and Go  · Whisper Test  · Cognitive Function Screening  ·   ·   ·   · Nutrition Screening  · ADL Screening  · PAQ Screening    Vitals:    07/13/18 0841   BP: 118/78   Pulse: 60   Weight: 74.8 kg (164 lb 14.5 oz)   Height: 5' 3" (1.6 m)     Body mass index is 29.21 kg/m².  Physical Exam   Constitutional: She is oriented to person, place, and time. She appears well-developed and well-nourished.   HENT:   Head: Normocephalic.   Cardiovascular: Normal rate, regular rhythm and intact distal pulses.    Pulmonary/Chest: Effort normal and breath sounds normal.   Abdominal: Soft. Bowel sounds are normal.   Musculoskeletal: Normal range of motion. She exhibits no edema.   Neurological: She is alert and oriented to person, place, and time.   Skin: Skin is warm and dry.   Psychiatric: She has a normal mood and affect.   Nursing note and vitals reviewed.        Diagnoses and health risks identified today and associated recommendations/orders:    1. Encounter for preventive health examination  Here for Health Risk Assessment/Annual Wellness Visit.  Health maintenance reviewed and updated. Follow up in one year.    2. Essential hypertension  Chronic, stable on current medications. Followed by PCP.    3. Atherosclerosis of aorta  Chronic, stable on current medications. Noted CXR 5/03/17. Followed by PCP.    4. Hyperlipidemia, unspecified hyperlipidemia type  Chronic, stable on current medication. Followed by PCP.    5. CKD (chronic " kidney disease), stage III  Chronic, stable. Followed by PCP.    6. Sacroiliac joint dysfunction  Chronic, stable on current medications. Followed by PCP, Pain Managment.    7. Lumbar facet arthropathy  Chronic, stable on current medications. Noted Lumbar XR 10/12/17.  Followed by PCP, pain Managment.    8. Spinal stenosis of lumbosacral region  Chronic, stable on current medications. Followed by PCP, Pain Managment.    9. Lumbar spondylosis  Chronic, stable on current medications. Followed by PCP, Pain Management.    10. Arthropathy of cervical facet joint  Chronic, stable on current medication. Noted Cervical XR 5/13/16. Followed by PCP, Pain Management.    11. Myofascial pain syndrome  Chronic, stable on current medications. Followed by PCP.    12. Gastroesophageal reflux disease without esophagitis  Chronic, stable on current medication. Followed by PCP.      Provided Zack with a 5-10 year written screening schedule and personal prevention plan. Recommendations were developed using the USPSTF age appropriate recommendations. Education, counseling, and referrals were provided as needed. After Visit Summary printed and given to patient which includes a list of additional screenings\tests needed.    Follow-up in 4 months (on 11/13/2018).with PCP    Alina Juares NP

## 2018-07-13 NOTE — PROGRESS NOTES
[facility] Established Patient Evaluation    Referred by: Dr. Rosemarie Hastings  Reason for referral: Left low back pain    CC:   Chief Complaint   Patient presents with    Low-back Pain     Interval Update:    7/16/18 - Patient returns to clinic for a 2 month follow up for lower back pain with a current pain score of 4/10.    5/18/18 - Patient returns to clinic following a LEFT Sacroiliac Joint Injection  Done 4/19/18 with 97% relief.  Her current pain score is 0/10.  She restarted yoga as discussed at our last visit; however, that is temporarily on hold pending a home renovation.    4/13/18 - Patient returns to clinic following a LEFT Sacroiliac Joint Injection done 12/14/17 with 60% relief.  She also inquires about limitations in activity specifically related to yoga and gym workouts.  She continues to have pain in the low back near the SI joint especially at night with pain going up to 8/10 and causing insomnia.    Background:  Zack Hopper is a pleasant 78 y.o. female who presented to my clinic complaining of lower back pain as characterized below.    Location: lower back   Severity: Currently: 7/10   Typical Range: 5-8/10     Exacerbation: 7/10   Onset: a few months ago  Quality: Aching  Radiation: Right posterior thigh  Axial/Extremity Percentage of Pain: 90/10  Exacerbating Factors: standing for more than 5 minutes and walking for more than 5 minutes  Mitigating Factors: ice and medications  Assoc: denies night fever/night sweats, urinary incontinence, bowel incontinence, significant weight loss, significant motor weakness and loss of sensations    Previous Therapies:  PT: Denies  HEP: Endorses daily walks  TENS:  Injections:    - 12/14/17 LEFT Sacroiliac Joint Injection - 60% relief  Surgery: Denies  Medications:    - NSAIDS: Yes, ibuprofen & meloxicam   - MSK Relaxants:    - TCAs:    - SNRIs:    - Topicals:     Current Pain Medications:  1. Tylenol   2. Ibuprofen    Full Medication  List:    Current Outpatient Prescriptions:     acetaminophen (TYLENOL) 500 MG tablet, Take 500 mg by mouth every 6 (six) hours as needed for Pain., Disp: , Rfl:     atovaquone-proguanil (MALARONE) 250-100 mg Tab, Take one tablet daily for malaria prevention. Begin one day before entering malarious area and continue for 1 week after return., Disp: 19 tablet, Rfl: 0    celecoxib (CELEBREX) 100 MG capsule, Take 1 capsule (100 mg total) by mouth 2 (two) times daily., Disp: 60 capsule, Rfl: 1    chlorthalidone (HYGROTEN) 25 MG Tab, Take 1 tablet (25 mg total) by mouth once daily., Disp: 90 tablet, Rfl: 1    omeprazole (PRILOSEC) 20 MG capsule, Take 20 mg by mouth once daily., Disp: , Rfl: 0    rosuvastatin (CRESTOR) 10 MG tablet, Take 1 tablet (10 mg total) by mouth once daily., Disp: 90 tablet, Rfl: 3    SHINGRIX, PF, 50 mcg/0.5 mL injection, inject 0.5 milliliter intramuscularly, Disp: , Rfl: 0    vitamin D 1000 units Tab, Take 1,000 Units by mouth once daily., Disp: , Rfl:     zolpidem (AMBIEN) 5 MG Tab, Take 1 tablet (5 mg total) by mouth nightly as needed., Disp: 30 tablet, Rfl: 2     Review of Systems:  Review of Systems   Constitutional: Negative for chills and fever.   HENT: Negative for nosebleeds.    Eyes: Negative for pain.   Respiratory: Negative for hemoptysis.    Cardiovascular: Negative for chest pain.   Gastrointestinal: Negative for nausea and vomiting.   Genitourinary: Negative for dysuria.   Musculoskeletal: Positive for back pain.   Skin: Negative for rash.   Neurological: Negative for tremors.   Endo/Heme/Allergies: Does not bruise/bleed easily.   Psychiatric/Behavioral: Negative for suicidal ideas.       Allergies:  Sulfacetamide     Medical History:  Past Medical History:   Diagnosis Date    DJD (degenerative joint disease)     Dyslipidemia     Hypertension     Nuclear sclerosis - Both Eyes 7/24/2012    Rosacea     Vitamin D deficiency disease         Surgical History:  Past Surgical  History:   Procedure Laterality Date    APPENDECTOMY      BREAST BIOPSY Left     2015    BREAST BIOPSY Right     10/2015 ex bx- papilloma     breast biopsy, left      10/2015     SECTION, CLASSIC      Three times    CHOLECYSTECTOMY      JOINT REPLACEMENT Right     TKA    SHOULDER OPEN ROTATOR CUFF REPAIR Right     TOTAL KNEE ARTHROPLASTY      right knee        Social History:  Social History     Social History    Marital status:      Spouse name: N/A    Number of children: N/A    Years of education: N/A     Occupational History    Not on file.     Social History Main Topics    Smoking status: Never Smoker    Smokeless tobacco: Never Used    Alcohol use 0.6 oz/week     1 Glasses of wine per week      Comment: approximately 2 glasses of wine weekly    Drug use: No    Sexual activity: Not on file     Other Topics Concern    Not on file     Social History Narrative    No narrative on file       Physical Exam:  Vitals:    18 0827   BP: 126/74   Pulse: 72   Weight: 74.4 kg (164 lb)   PainSc:   4     General    Nursing note and vitals reviewed.  Constitutional: She is oriented to person, place, and time. She appears well-developed and well-nourished. No distress.   HENT:   Head: Normocephalic and atraumatic.   Nose: Nose normal.   Eyes: Conjunctivae and EOM are normal. Pupils are equal, round, and reactive to light. Right eye exhibits no discharge. Left eye exhibits no discharge. No scleral icterus.   Neck: No JVD present.   Cardiovascular: Intact distal pulses.    Pulmonary/Chest: Effort normal. No respiratory distress.   Abdominal: She exhibits no distension.   Neurological: She is alert and oriented to person, place, and time. Coordination normal.   Psychiatric: She has a normal mood and affect. Her behavior is normal. Judgment and thought content normal.     General Musculoskeletal Exam   Gait: antalgic         Right Hip Exam     Tests   Pain w/ forced internal rotation  (NAKITA): absent  Left Hip Exam     Tests   Pain w/ forced internal rotation (NAKITA): present      Back (L-Spine & T-Spine) / Neck (C-Spine) Exam     Tenderness Left paramedian tenderness of the Sacrum.     Back (L-Spine & T-Spine) Range of Motion   Extension: abnormal Back extension: facet loading positive on left.         Imaging:MRI Lumbar Spine Without Contrast   [id number] 11/30/17  05:58:48 [id number] ([facility]) : MRI LUMBAR SPINE WITHOUT CONTRAST    SUPPLIED CLINICAL HISTORY:  Low back pain    TECHNIQUE:  Multiplanar, multisequence images of the lumbar spine were obtained without the use of intravenous contrast.      COMPARISON: Lumbar spine radiograph 10/12/2017     FINDINGS:    There is grade 1 anterolisthesis of L4 on L5.  Vertebral body heights are well-maintained without signal abnormality to suggest acute fracture or infiltrative marrow placement process.  Intervertebral discs demonstrate multilevel degeneration with disc space height loss most prominent at L5-S1 and L1-L2.  Conus and lumbar spinal nerve roots are normal in signal.  Spinal cord terminates at the L1 vertebral level.  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    Limited evaluation of the surrounding soft tissues demonstrate no significant abnormalities.      T12-L1:  No focal disc abnormalities.  No significant spinal canal stenosis or neuroforaminal narrowing.    L1-2:  Circumferential disc bulge as well as mild bilateral facet hypertrophy results in mild spinal canal stenosis as well as severe right and moderate left neuroforaminal narrowing.      L2-3:  Circumferential disc bulge as well as mild bilateral facet hypertrophy results in moderate spinal canal stenosis as well as moderate bilateral neuroforaminal narrowing.      L3-4:  Circumferential disc bulge as well as moderate bilateral facet hypertrophy and ligamentum flavum thickening results in mild spinal canal stenosis as well as mild right and moderate left neuroforaminal narrowing      L4-5:   Grade 1 anterolisthesis along with moderate bilateral facet hypertrophy and ligamentum flavum thickening results in moderate spinal canal stenosis as well as mild bilateral neuroforaminal narrowing.      L5-S1:  Mild circumferential disc bulge as well as mild bilateral facet hypertrophy results in moderate right and severe left neuroforaminal narrowing.   Impression    1.  Multilevel spondylosis of the lumbar spine results in moderate spinal canal stenosis at L2-3 and L4-5 as well as severe right neuroforaminal narrowing at L1-2 and severe left neuroforaminal narrowing at L5-S1.    2.  Grade one anterolisthesis of L4 on L5.     X-Ray Lumbar Spine Ap Lateral w/Flex Ext  5 views of the lumbar spine were obtained, with AP, lateral, lumbosacral lateral, and lateral flexion and extension images all submitted.    Mild scoliosis convex to the right in the lumbar region is seen, as is 7.5 mm of anterolisthesis of L4 in relation to L5, the latter alignment abnormality secondary to L4-5 facet arthropathy.  Alignment at other levels appears unremarkable, and there is no evidence of significant translational instability seen on the weight- bearing flexion/extension images.  Vertebral body heights are normally maintained, without compression deformity at any level.  There is prominent disc narrowing seen at every lumbar level, the most severe degrees of narrowing being at L1-2 and L5-S1, with a vacuum phenomenon indicative of disc desiccation seen at the L5-S1 level.  Marginal vertebral endplate spurring is observed, particularly at L1-2 and L5-S1.  Facet arthropathy is seen at L5-S1, in addition to that previously mentioned at L4-5.  Vertebral endplates are well-defined.  No osseous destructive process.  Surgical clips are seen in the right upper quadrant, and calcification in the wall of the abdominal aorta, without definable aneurysm, is also incidentally observed.   Impression    Advanced multilevel degenerative changes are  identified as discussed in detail above, with the disc narrowing and vertebral endplate spurring most pronounced at L1-2 and L5-S1, and with anterolisthesis seen at the L4-5 level on the basis of underlying facet arthropathy.  Although the findings are similar to the exam of 8/16/12, there has been some progression of this degenerative change since that time, particularly at the L1-2 level, which exhibits considerably greater disc narrowing on the current exam.  No compression fracture.  No translational instability.     Labs:  BMP  Lab Results   Component Value Date     07/09/2018    K 4.0 07/09/2018     07/09/2018    CO2 30 (H) 07/09/2018    BUN 19 07/09/2018    CREATININE 1.0 07/09/2018    CALCIUM 10.2 07/09/2018    ANIONGAP 8 07/09/2018    ESTGFRAFRICA >60.0 07/09/2018    EGFRNONAA 54.9 (A) 07/09/2018     Lab Results   Component Value Date    ALT 30 07/09/2018    AST 23 07/09/2018    ALKPHOS 73 07/09/2018    BILITOT 1.7 (H) 07/09/2018       Diagnoses & Associated Orders:  Problem List Items Addressed This Visit     Lumbar spondylosis    Sacroiliac joint dysfunction - Primary    Lumbar facet arthropathy        76 yo F with axial right lower back pain due to SI dysfunction and facet arthopathy without evidence of radiculopathy at this time.  While severe DDD and NFS may be a component of her pain, exam and HPI is most consistent with SI as the primary pain generator as this time.     4/13/18 - I strongly encouraged her to resume yoga and activity.  She previously used Celebrex for her pain and is amenable to trying it again.  Her labs show a renal function trend that is borderline normal vs CKD I and I will order a BMP today to verify that function is stable and appropriate.    7/16/18 - Patient requesting repeat SI injection which is appropriate as it is working well for management of her symptoms.    Recommendations & Interventions:   Procedures: Repeat LEFT SI injection x1  Medications:    - Cont  Celebrex 100 mg BID   - I plan to reorder BMP every 6 months to assess renal fxn to ensure that it remains stable while on NSAID therapy  Imaging: No additional at this time  PT/OT: Restart yoga and patient is amenable to PT if no improvement within 3 months  HEP: Cont walking daily as tolerated  Follow Up: RTC 2 months    Ricci Lockett Jr, MD  Interventional Pain Medicine / Anesthesiology    Disclaimer: This note was partly generated using dictation software which may occasionally result in transcription errors.   no

## 2018-07-16 ENCOUNTER — OFFICE VISIT (OUTPATIENT)
Dept: PAIN MEDICINE | Facility: CLINIC | Age: 78
End: 2018-07-16
Payer: MEDICARE

## 2018-07-16 VITALS
SYSTOLIC BLOOD PRESSURE: 126 MMHG | WEIGHT: 164 LBS | HEART RATE: 72 BPM | BODY MASS INDEX: 29.05 KG/M2 | DIASTOLIC BLOOD PRESSURE: 74 MMHG

## 2018-07-16 DIAGNOSIS — M47.816 LUMBAR FACET ARTHROPATHY: ICD-10-CM

## 2018-07-16 DIAGNOSIS — M53.3 SACROILIAC JOINT DYSFUNCTION: Primary | ICD-10-CM

## 2018-07-16 DIAGNOSIS — M47.816 LUMBAR SPONDYLOSIS: ICD-10-CM

## 2018-07-16 PROCEDURE — 3074F SYST BP LT 130 MM HG: CPT | Mod: CPTII,S$GLB,, | Performed by: PAIN MEDICINE

## 2018-07-16 PROCEDURE — 3078F DIAST BP <80 MM HG: CPT | Mod: CPTII,S$GLB,, | Performed by: PAIN MEDICINE

## 2018-07-16 PROCEDURE — 99214 OFFICE O/P EST MOD 30 MIN: CPT | Mod: S$GLB,,, | Performed by: PAIN MEDICINE

## 2018-07-16 PROCEDURE — 99999 PR PBB SHADOW E&M-EST. PATIENT-LVL III: CPT | Mod: PBBFAC,,, | Performed by: PAIN MEDICINE

## 2018-08-01 NOTE — DISCHARGE INSTRUCTIONS
Home Care Instructions Pain Management:    1.  DIET:    You may resume your normal diet today.    2.  BATHING:    You may shower with luke warm water.    3.  DRESSING:    You may remove your bandage today.    4.  ACTIVITY LEVEL:      You may resume your normal activities 24 hours after your procedure.    5.  MEDICATIONS:    You may resume your normal medications today.    6.  SPECIAL INSTRUCTIONS:    No heat to the injection site for 24 hours including bath or shower, heating pad, moist heat or hot tubs.    Use an ice pack to the injection site for any pain or discomfort.  Apply ice packs for 20 minute intervals as needed.    If you have received any sedatives by mouth today, you can not drive for 12 hours.    If you have received sedation through an IV, you can not drive for 24 hours.    PLEASE CALL YOUR DOCTOR FOR THE FOLLOWIN.  Redness or swelling around the injection site.  2.  Fever of 101 degrees.  3.  Drainage (pus) from the injection site.  4.  For any continuous bleeding (some dried blood over the incision is normal.)    FOR EMERGENCIES:    If any unusual problems or difficulties occur during clinic hours, call [phone] or dial [phone].    Follow up with with your physician in 2-3 weeks.       Procedural Sedation  Procedural sedation is medicine to ease discomfort, pain, and anxiety during a procedure. The medicine is often given through an IV (intravenous) line in your arm or hand. In some cases, the medicine may be taken by mouth or inhaled. While you are under sedation, you will likely be awake. But you may not remember anything afterward.  Why procedural sedation is used  Sedation is used for many types of procedures. The goal is to reduce pain, anxiety, and stressful memories of a procedure. It can also help your healthcare provider treat you. For example, having a broken bone fixed may be easier if you feel relaxed.  Procedural sedation is used only for short, basic procedures. It is not used  for complex surgeries. Some procedures that use this type of sedation include:  · Dental surgery  · Breast biopsy, to take a sample of breast tissue  · Endoscopy, to look at gastrointestinal problems  · Bronchoscopy, to check for lung problems  · Bone or joint realignment, to fix a broken bone or dislocated joint  · Minor foot or skin surgery  · Electrical cardioversion, to restore a normal heart rhythm  · Lumbar puncture, to assess neurological disease  Risks of procedural sedation  Procedural sedation has some risks and possible side effects, such as:  · Headache  · Nausea and vomiting  · Unpleasant memory of the procedure  · Lowered rate of breathing  · Changes in heart rate and blood pressure (rare)  · Inhalation of stomach contents into your lungs (rare)  Side effects will likely go away shortly after the procedure. Your healthcare team will watch your heart rate and breathing during your sedation. This is to help prevent problems.  Your own risks may vary based on your age and your overall health. They also depend on the type of sedation you are given. Talk with your healthcare provider about the risks that apply most to you.  Getting ready for procedural sedation  Talk with your healthcare provider about how to get ready for your procedure. Tell him or her about all the medicines you take. This includes over-the-counter medicines such as ibuprofen. It also includes vitamins, herbs, and other supplements. You may need to stop taking some medicines before the procedure, such as blood thinners and aspirin. If you smoke, you should stop to lessen the chance of a lung issue. Talk with your healthcare provider if you need help to stop smoking.  Tell your healthcare provider if you:  · Have had any problems in the past with sedation or anesthesia  · Have had any recent changes in your health, such as an infection or fever  · Are pregnant or think you may be pregnant  Also be sure to:  · Ask a family member or friend  to take you home after the procedure. You cant drive on the day you receive sedation.  · Not eat or drink after midnight the night before your procedure, if advised.  · Not plan on making any important decisions, such as financial or legal, for the day after you receive the sedation.  · Follow all other instructions from your healthcare provider.  During your procedural sedation  You may have your procedure in a hospital or a medical clinic. Sedation is done by a trained healthcare provider. In general, you can expect the following:  · You will be given medicine through an IV line in your arm or hand. Or you may receive a shot. The medicine may also be given by mouth. Or you may inhale it through a mask.  · If you receive medicine through an IV, you may feel the effects very quickly. You will start to feel relaxed and drowsy.  · During the procedure, your heart rate, breathing, and blood pressure will be closely watched. Your breathing and blood pressure may decrease a little. But you will likely not need help with your breathing. You may receive a little extra oxygen through a mask or through some soft plastic prongs underneath your nose.  · You will probably be awake the entire time. If you do fall asleep, you should be easy to wake up, if needed. You should feel little or no pain.  · When your procedure is over, the sedative medicine will be stopped.  After your procedural sedation  You will begin to feel more awake and aware. But you will likely be drowsy for a while afterward. You will be closely watched as you become more alert. You may have a faint memory of the procedure. Or you may not remember it at all.  You should be able to return home within an hour or two after your procedure. Plan to have someone stay with you for a few hours. Side effects such as headache and nausea may go away quickly. Tell your healthcare provider if they continue.  Dont drive or make any important decisions for at least 24  hours. Be sure to follow all after-care instructions.     When to call your healthcare provider  Have someone call your healthcare provider right away if you have any of these:  · Drowsiness that gets worse  · Weakness or dizziness that gets worse  · Repeated vomiting  · You cant be awakened  · Severe or ongoing pain from the procedure, not relieved by the pain medicine   Date Last Reviewed: 2/1/2017  © 8298-3839 [employer]. [address] [place] [address], [place] [address]. All rights reserved. This information is not intended as a substitute for professional medical care. Always follow your healthcare professional's instructions.

## 2018-08-01 NOTE — OR NURSING
Instructed to arrive at 12:45pm and to fast after 6:00am. Pt also instructed to provide transportation following the procedure. Pt. Verb. Understanding.

## 2018-08-02 ENCOUNTER — SURGERY (OUTPATIENT)
Age: 78
End: 2018-08-02

## 2018-08-02 ENCOUNTER — HOSPITAL ENCOUNTER (OUTPATIENT)
Facility: HOSPITAL | Age: 78
Discharge: HOME | End: 2018-08-02
Attending: PAIN MEDICINE | Admitting: PAIN MEDICINE
Payer: MEDICARE

## 2018-08-02 VITALS
BODY MASS INDEX: 29.23 KG/M2 | WEIGHT: 165 LBS | DIASTOLIC BLOOD PRESSURE: 68 MMHG | SYSTOLIC BLOOD PRESSURE: 129 MMHG | RESPIRATION RATE: 15 BRPM | OXYGEN SATURATION: 98 % | TEMPERATURE: 98 F | HEIGHT: 63 IN | HEART RATE: 74 BPM

## 2018-08-02 DIAGNOSIS — M53.3 SACROILIAC JOINT DYSFUNCTION: Primary | ICD-10-CM

## 2018-08-02 PROCEDURE — 25500020 PHARM REV CODE 255: Performed by: PAIN MEDICINE

## 2018-08-02 PROCEDURE — 63600175 PHARM REV CODE 636 W HCPCS: Performed by: PAIN MEDICINE

## 2018-08-02 PROCEDURE — 27096 INJECT SACROILIAC JOINT: CPT | Mod: LT,,, | Performed by: PAIN MEDICINE

## 2018-08-02 PROCEDURE — 27096 INJECT SACROILIAC JOINT: CPT | Performed by: PAIN MEDICINE

## 2018-08-02 PROCEDURE — 25000003 PHARM REV CODE 250: Performed by: PAIN MEDICINE

## 2018-08-02 RX ORDER — TRIAMCINOLONE ACETONIDE 40 MG/ML
INJECTION, SUSPENSION INTRA-ARTICULAR; INTRAMUSCULAR
Status: DISCONTINUED | OUTPATIENT
Start: 2018-08-02 | End: 2018-08-02 | Stop reason: HOSPADM

## 2018-08-02 RX ORDER — BUPIVACAINE HYDROCHLORIDE 2.5 MG/ML
INJECTION, SOLUTION EPIDURAL; INFILTRATION; INTRACAUDAL
Status: DISCONTINUED | OUTPATIENT
Start: 2018-08-02 | End: 2018-08-02 | Stop reason: HOSPADM

## 2018-08-02 RX ORDER — LIDOCAINE HYDROCHLORIDE 10 MG/ML
INJECTION, SOLUTION EPIDURAL; INFILTRATION; INTRACAUDAL; PERINEURAL
Status: DISCONTINUED | OUTPATIENT
Start: 2018-08-02 | End: 2018-08-02 | Stop reason: HOSPADM

## 2018-08-02 RX ORDER — ALPRAZOLAM 0.5 MG/1
0.5 TABLET, ORALLY DISINTEGRATING ORAL ONCE AS NEEDED
Status: COMPLETED | OUTPATIENT
Start: 2018-08-02 | End: 2018-08-02

## 2018-08-02 RX ADMIN — LIDOCAINE HYDROCHLORIDE 5 ML: 10 INJECTION, SOLUTION EPIDURAL; INFILTRATION; INTRACAUDAL; PERINEURAL at 01:08

## 2018-08-02 RX ADMIN — BUPIVACAINE HYDROCHLORIDE 5 ML: 2.5 INJECTION, SOLUTION EPIDURAL; INFILTRATION; INTRACAUDAL; PERINEURAL at 01:08

## 2018-08-02 RX ADMIN — IOHEXOL 5 ML: 300 INJECTION, SOLUTION INTRAVENOUS at 01:08

## 2018-08-02 RX ADMIN — TRIAMCINOLONE ACETONIDE 40 MG: 40 INJECTION, SUSPENSION INTRA-ARTICULAR; INTRAMUSCULAR at 01:08

## 2018-08-02 RX ADMIN — ALPRAZOLAM 0.5 MG: 0.5 TABLET, ORALLY DISINTEGRATING ORAL at 12:08

## 2018-08-02 NOTE — H&P (VIEW-ONLY)
[facility] Established Patient Evaluation    Referred by: Dr. Rosemarie Hastings  Reason for referral: Left low back pain    CC:   Chief Complaint   Patient presents with    Low-back Pain     Interval Update:    7/16/18 - Patient returns to clinic for a 2 month follow up for lower back pain with a current pain score of 4/10.    5/18/18 - Patient returns to clinic following a LEFT Sacroiliac Joint Injection  Done 4/19/18 with 97% relief.  Her current pain score is 0/10.  She restarted yoga as discussed at our last visit; however, that is temporarily on hold pending a home renovation.    4/13/18 - Patient returns to clinic following a LEFT Sacroiliac Joint Injection done 12/14/17 with 60% relief.  She also inquires about limitations in activity specifically related to yoga and gym workouts.  She continues to have pain in the low back near the SI joint especially at night with pain going up to 8/10 and causing insomnia.    Background:  Zack Hopper is a pleasant 78 y.o. female who presented to my clinic complaining of lower back pain as characterized below.    Location: lower back   Severity: Currently: 7/10   Typical Range: 5-8/10     Exacerbation: 7/10   Onset: a few months ago  Quality: Aching  Radiation: Right posterior thigh  Axial/Extremity Percentage of Pain: 90/10  Exacerbating Factors: standing for more than 5 minutes and walking for more than 5 minutes  Mitigating Factors: ice and medications  Assoc: denies night fever/night sweats, urinary incontinence, bowel incontinence, significant weight loss, significant motor weakness and loss of sensations    Previous Therapies:  PT: Denies  HEP: Endorses daily walks  TENS:  Injections:    - 12/14/17 LEFT Sacroiliac Joint Injection - 60% relief  Surgery: Denies  Medications:    - NSAIDS: Yes, ibuprofen & meloxicam   - MSK Relaxants:    - TCAs:    - SNRIs:    - Topicals:     Current Pain Medications:  1. Tylenol   2. Ibuprofen    Full Medication  List:    Current Outpatient Prescriptions:     acetaminophen (TYLENOL) 500 MG tablet, Take 500 mg by mouth every 6 (six) hours as needed for Pain., Disp: , Rfl:     atovaquone-proguanil (MALARONE) 250-100 mg Tab, Take one tablet daily for malaria prevention. Begin one day before entering malarious area and continue for 1 week after return., Disp: 19 tablet, Rfl: 0    celecoxib (CELEBREX) 100 MG capsule, Take 1 capsule (100 mg total) by mouth 2 (two) times daily., Disp: 60 capsule, Rfl: 1    chlorthalidone (HYGROTEN) 25 MG Tab, Take 1 tablet (25 mg total) by mouth once daily., Disp: 90 tablet, Rfl: 1    omeprazole (PRILOSEC) 20 MG capsule, Take 20 mg by mouth once daily., Disp: , Rfl: 0    rosuvastatin (CRESTOR) 10 MG tablet, Take 1 tablet (10 mg total) by mouth once daily., Disp: 90 tablet, Rfl: 3    SHINGRIX, PF, 50 mcg/0.5 mL injection, inject 0.5 milliliter intramuscularly, Disp: , Rfl: 0    vitamin D 1000 units Tab, Take 1,000 Units by mouth once daily., Disp: , Rfl:     zolpidem (AMBIEN) 5 MG Tab, Take 1 tablet (5 mg total) by mouth nightly as needed., Disp: 30 tablet, Rfl: 2     Review of Systems:  Review of Systems   Constitutional: Negative for chills and fever.   HENT: Negative for nosebleeds.    Eyes: Negative for pain.   Respiratory: Negative for hemoptysis.    Cardiovascular: Negative for chest pain.   Gastrointestinal: Negative for nausea and vomiting.   Genitourinary: Negative for dysuria.   Musculoskeletal: Positive for back pain.   Skin: Negative for rash.   Neurological: Negative for tremors.   Endo/Heme/Allergies: Does not bruise/bleed easily.   Psychiatric/Behavioral: Negative for suicidal ideas.       Allergies:  Sulfacetamide     Medical History:  Past Medical History:   Diagnosis Date    DJD (degenerative joint disease)     Dyslipidemia     Hypertension     Nuclear sclerosis - Both Eyes 7/24/2012    Rosacea     Vitamin D deficiency disease         Surgical History:  Past Surgical  History:   Procedure Laterality Date    APPENDECTOMY      BREAST BIOPSY Left     2015    BREAST BIOPSY Right     10/2015 ex bx- papilloma     breast biopsy, left      10/2015     SECTION, CLASSIC      Three times    CHOLECYSTECTOMY      JOINT REPLACEMENT Right     TKA    SHOULDER OPEN ROTATOR CUFF REPAIR Right     TOTAL KNEE ARTHROPLASTY      right knee        Social History:  Social History     Social History    Marital status:      Spouse name: N/A    Number of children: N/A    Years of education: N/A     Occupational History    Not on file.     Social History Main Topics    Smoking status: Never Smoker    Smokeless tobacco: Never Used    Alcohol use 0.6 oz/week     1 Glasses of wine per week      Comment: approximately 2 glasses of wine weekly    Drug use: No    Sexual activity: Not on file     Other Topics Concern    Not on file     Social History Narrative    No narrative on file       Physical Exam:  Vitals:    18 0827   BP: 126/74   Pulse: 72   Weight: 74.4 kg (164 lb)   PainSc:   4     General    Nursing note and vitals reviewed.  Constitutional: She is oriented to person, place, and time. She appears well-developed and well-nourished. No distress.   HENT:   Head: Normocephalic and atraumatic.   Nose: Nose normal.   Eyes: Conjunctivae and EOM are normal. Pupils are equal, round, and reactive to light. Right eye exhibits no discharge. Left eye exhibits no discharge. No scleral icterus.   Neck: No JVD present.   Cardiovascular: Intact distal pulses.    Pulmonary/Chest: Effort normal. No respiratory distress.   Abdominal: She exhibits no distension.   Neurological: She is alert and oriented to person, place, and time. Coordination normal.   Psychiatric: She has a normal mood and affect. Her behavior is normal. Judgment and thought content normal.     General Musculoskeletal Exam   Gait: antalgic         Right Hip Exam     Tests   Pain w/ forced internal rotation  (NAKITA): absent  Left Hip Exam     Tests   Pain w/ forced internal rotation (NAKITA): present      Back (L-Spine & T-Spine) / Neck (C-Spine) Exam     Tenderness Left paramedian tenderness of the Sacrum.     Back (L-Spine & T-Spine) Range of Motion   Extension: abnormal Back extension: facet loading positive on left.         Imaging:MRI Lumbar Spine Without Contrast   [id number] 11/30/17  05:58:48 [id number] ([facility]) : MRI LUMBAR SPINE WITHOUT CONTRAST    SUPPLIED CLINICAL HISTORY:  Low back pain    TECHNIQUE:  Multiplanar, multisequence images of the lumbar spine were obtained without the use of intravenous contrast.      COMPARISON: Lumbar spine radiograph 10/12/2017     FINDINGS:    There is grade 1 anterolisthesis of L4 on L5.  Vertebral body heights are well-maintained without signal abnormality to suggest acute fracture or infiltrative marrow placement process.  Intervertebral discs demonstrate multilevel degeneration with disc space height loss most prominent at L5-S1 and L1-L2.  Conus and lumbar spinal nerve roots are normal in signal.  Spinal cord terminates at the L1 vertebral level.  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    Limited evaluation of the surrounding soft tissues demonstrate no significant abnormalities.      T12-L1:  No focal disc abnormalities.  No significant spinal canal stenosis or neuroforaminal narrowing.    L1-2:  Circumferential disc bulge as well as mild bilateral facet hypertrophy results in mild spinal canal stenosis as well as severe right and moderate left neuroforaminal narrowing.      L2-3:  Circumferential disc bulge as well as mild bilateral facet hypertrophy results in moderate spinal canal stenosis as well as moderate bilateral neuroforaminal narrowing.      L3-4:  Circumferential disc bulge as well as moderate bilateral facet hypertrophy and ligamentum flavum thickening results in mild spinal canal stenosis as well as mild right and moderate left neuroforaminal narrowing      L4-5:   Grade 1 anterolisthesis along with moderate bilateral facet hypertrophy and ligamentum flavum thickening results in moderate spinal canal stenosis as well as mild bilateral neuroforaminal narrowing.      L5-S1:  Mild circumferential disc bulge as well as mild bilateral facet hypertrophy results in moderate right and severe left neuroforaminal narrowing.   Impression    1.  Multilevel spondylosis of the lumbar spine results in moderate spinal canal stenosis at L2-3 and L4-5 as well as severe right neuroforaminal narrowing at L1-2 and severe left neuroforaminal narrowing at L5-S1.    2.  Grade one anterolisthesis of L4 on L5.     X-Ray Lumbar Spine Ap Lateral w/Flex Ext  5 views of the lumbar spine were obtained, with AP, lateral, lumbosacral lateral, and lateral flexion and extension images all submitted.    Mild scoliosis convex to the right in the lumbar region is seen, as is 7.5 mm of anterolisthesis of L4 in relation to L5, the latter alignment abnormality secondary to L4-5 facet arthropathy.  Alignment at other levels appears unremarkable, and there is no evidence of significant translational instability seen on the weight- bearing flexion/extension images.  Vertebral body heights are normally maintained, without compression deformity at any level.  There is prominent disc narrowing seen at every lumbar level, the most severe degrees of narrowing being at L1-2 and L5-S1, with a vacuum phenomenon indicative of disc desiccation seen at the L5-S1 level.  Marginal vertebral endplate spurring is observed, particularly at L1-2 and L5-S1.  Facet arthropathy is seen at L5-S1, in addition to that previously mentioned at L4-5.  Vertebral endplates are well-defined.  No osseous destructive process.  Surgical clips are seen in the right upper quadrant, and calcification in the wall of the abdominal aorta, without definable aneurysm, is also incidentally observed.   Impression    Advanced multilevel degenerative changes are  identified as discussed in detail above, with the disc narrowing and vertebral endplate spurring most pronounced at L1-2 and L5-S1, and with anterolisthesis seen at the L4-5 level on the basis of underlying facet arthropathy.  Although the findings are similar to the exam of 8/16/12, there has been some progression of this degenerative change since that time, particularly at the L1-2 level, which exhibits considerably greater disc narrowing on the current exam.  No compression fracture.  No translational instability.     Labs:  BMP  Lab Results   Component Value Date     07/09/2018    K 4.0 07/09/2018     07/09/2018    CO2 30 (H) 07/09/2018    BUN 19 07/09/2018    CREATININE 1.0 07/09/2018    CALCIUM 10.2 07/09/2018    ANIONGAP 8 07/09/2018    ESTGFRAFRICA >60.0 07/09/2018    EGFRNONAA 54.9 (A) 07/09/2018     Lab Results   Component Value Date    ALT 30 07/09/2018    AST 23 07/09/2018    ALKPHOS 73 07/09/2018    BILITOT 1.7 (H) 07/09/2018       Diagnoses & Associated Orders:  Problem List Items Addressed This Visit     Lumbar spondylosis    Sacroiliac joint dysfunction - Primary    Lumbar facet arthropathy        76 yo F with axial right lower back pain due to SI dysfunction and facet arthopathy without evidence of radiculopathy at this time.  While severe DDD and NFS may be a component of her pain, exam and HPI is most consistent with SI as the primary pain generator as this time.     4/13/18 - I strongly encouraged her to resume yoga and activity.  She previously used Celebrex for her pain and is amenable to trying it again.  Her labs show a renal function trend that is borderline normal vs CKD I and I will order a BMP today to verify that function is stable and appropriate.    7/16/18 - Patient requesting repeat SI injection which is appropriate as it is working well for management of her symptoms.    Recommendations & Interventions:   Procedures: Repeat LEFT SI injection x1  Medications:    - Cont  Celebrex 100 mg BID   - I plan to reorder BMP every 6 months to assess renal fxn to ensure that it remains stable while on NSAID therapy  Imaging: No additional at this time  PT/OT: Restart yoga and patient is amenable to PT if no improvement within 3 months  HEP: Cont walking daily as tolerated  Follow Up: RTC 2 months    Ricci Lockett Jr, MD  Interventional Pain Medicine / Anesthesiology    Disclaimer: This note was partly generated using dictation software which may occasionally result in transcription errors.

## 2018-08-02 NOTE — DISCHARGE SUMMARY
[facility]  Discharge Note  Short Stay     Admit Date: 8/2/2018    Discharge Date: 8/2/2018     Attending Physician: Ricci Lockett Jr, MD    Diagnoses:  Active Hospital Problems    Diagnosis  POA    *Sacroiliac joint dysfunction [M53.3]  Yes      Resolved Hospital Problems    Diagnosis Date Resolved POA   No resolved problems to display.     Discharged Condition: Good     Hospital Course: Patient was admitted for an outpatient interventional pain management procedure and tolerated the procedure well with no complications.     Final Diagnoses: Same as principal problem.     Disposition: Home or Self Care     Follow up/Patient Instructions:    Follow-up Information     Ricci Lockett Jr, MD. Go in 1 month.    Specialty:  Pain Medicine  Why:  Post-procedural Follow Up As Scheduled, Call to make an appointment if you do not have one  Contact information:  [address]  [address]  [place] [address]  [phone]                   Reconciled Medications:     Medication List      CONTINUE taking these medications    acetaminophen 500 MG tablet  Commonly known as:  TYLENOL  Take 500 mg by mouth every 6 (six) hours as needed for Pain.     atovaquone-proguanil 250-100 mg Tab  Commonly known as:  MALARONE  Take one tablet daily for malaria prevention. Begin one day before entering malarious area and continue for 1 week after return.     celecoxib 100 MG capsule  Commonly known as:  CeleBREX  Take 1 capsule (100 mg total) by mouth 2 (two) times daily.     chlorthalidone 25 MG Tab  Commonly known as:  HYGROTEN  Take 1 tablet (25 mg total) by mouth once daily.     omeprazole 20 MG capsule  Commonly known as:  PRILOSEC  Take 20 mg by mouth once daily.     rosuvastatin 10 MG tablet  Commonly known as:  CRESTOR  Take 1 tablet (10 mg total) by mouth once daily.     SHINGRIX (PF) 50 mcg/0.5 mL injection  Generic drug:  varicella-zoster [id number] (PF)  inject 0.5 milliliter intramuscularly     vitamin D 1000  units Tab  Take 1,000 Units by mouth once daily.     zolpidem 5 MG Tab  Commonly known as:  AMBIEN  Take 1 tablet (5 mg total) by mouth nightly as needed.            Discharge Procedure Orders (must include Diet, Follow-up, Activity)  Call MD for:  temperature >100.4     Call MD for:  severe uncontrolled pain     Call MD for:  redness, tenderness, or signs of infection (pain, swelling, redness, odor or green/yellow discharge around incision site)     Call MD for:  difficulty breathing or increased cough     Call MD for:  severe persistent headache     Call MD for:  worsening rash     Remove dressing in 24 hours         Ricci Lockett Jr, MD  Interventional Pain Medicine / Anesthesiology

## 2018-08-02 NOTE — OP NOTE
Procedure Note    Pre-operative Diagnosis: Sacroiliac joint dysfunction  Post-operative Diagnosis: Sacroiliac joint dysfunction  Procedure: (1) LEFT Sacroiliac Joint Injection and (2) Intraoperative fluoroscopy  Procedure Date: 08/02/2018      Anesthesia: Local    Indications: (1) To alleviate pain and suffering, (2) reduce functional impairment, and for (3) diagnostic purposes.    The patients history and physical exam were reviewed. The risks (local discomfort, infection, headache, temporary or permanent weakness and/or numbness of one or both legs, temporary or permanent paraplegia, heart attack and stroke), benefits and alternatives to the procedure were discussed, and all questions were answered to the patients satisfaction. The patient agreed to proceed, and written, informed consent was verified.    Procedure in Detail: Patient was brought back to procedure room and placed in a prone position and head resting comfortably in head ring. Prior to the initiation of the procedure, the patient's identity, the site, and the nature of the procedure were verified.     The skin was prepped with chloroprep and sterile drapes were applied. The Left SI joint was identified by fluoroscopy in an oblique plane. Skin and subcutaneous tissues overyling the target area was anesthetized with 1-2 mL of Lidocaine 1%.  A 22g 3 1/2 inch spinal needle was advanced under intermittent fluoroscopy until joint space was entered. There was no paresthesia with needle placement. Aspiration was negative for blood. Omnipaque 0.5 mL was injected confirming appropriate position and spread into the joint without intravascualr uptake. Next, 2 mL containing 40 mg kenalog and 1 mL of 0.25% Marcaine  was injected without difficulty or resistance.  The spinal needle was removed with lidocaine flush and bandaged placed over site of needle insertion.      EBL: nil    Disposition: The patient tolerated the procedure well, and there were no apparent  complications. Vital signs remained stable throughout the procedure. The patient was taken to the recovery area where written discharge instructions for the procedure were given.     Follow-up: RTC as scheduled      Ricci Lockett Jr, MD  Interventional Pain Medicine / Anesthesiology

## 2018-08-06 ENCOUNTER — TELEPHONE (OUTPATIENT)
Dept: PAIN MEDICINE | Facility: HOSPITAL | Age: 78
End: 2018-08-06

## 2018-08-23 NOTE — PROGRESS NOTES
[facility] Established Patient Evaluation    Referred by: Dr. Rosemarie Hastings  Reason for referral: Left low back pain    CC:   No chief complaint on file.    Interval Update:  8/27/18 pt returns today S/P LEFT Sacroiliac Joint Injection on 8/2/18 with 80% continued relief.  Patient states she is doing well, is scheduled for vacation for the next 2 weeks, discussed that we will consider physical therapy upon return.    7/16/18 - Patient returns to clinic for a 2 month follow up for lower back pain with a current pain score of 4/10.    5/18/18 - Patient returns to clinic following a LEFT Sacroiliac Joint Injection  Done 4/19/18 with 97% relief.  Her current pain score is 0/10.  She restarted yoga as discussed at our last visit; however, that is temporarily on hold pending a home renovation.    4/13/18 - Patient returns to clinic following a LEFT Sacroiliac Joint Injection done 12/14/17 with 60% relief.  She also inquires about limitations in activity specifically related to yoga and gym workouts.  She continues to have pain in the low back near the SI joint especially at night with pain going up to 8/10 and causing insomnia.    Background:  Zack Hopper is a pleasant 78 y.o. female who presented to my clinic complaining of lower back pain as characterized below.    Location: lower back   Severity: Currently: 7/10   Typical Range: 5-8/10     Exacerbation: 7/10   Onset: a few months ago  Quality: Aching  Radiation: Right posterior thigh  Axial/Extremity Percentage of Pain: 90/10  Exacerbating Factors: standing for more than 5 minutes and walking for more than 5 minutes  Mitigating Factors: ice and medications  Assoc: denies night fever/night sweats, urinary incontinence, bowel incontinence, significant weight loss, significant motor weakness and loss of sensations    Previous Therapies:  PT: Denies  HEP: Endorses daily walks  TENS:  Injections: 8/2/18 LEFT Sacroiliac Joint Injection                    2/14/17 LEFT Sacroiliac Joint Injection- 1 - 60% relief  Surgery: Denies  Medications:    - NSAIDS: Yes, ibuprofen & meloxicam   - MSK Relaxants:    - TCAs:    - SNRIs:    - Topicals:     Current Pain Medications:  1. Tylenol   2. Ibuprofen    Full Medication List:    Current Outpatient Medications:     acetaminophen (TYLENOL) 500 MG tablet, Take 500 mg by mouth every 6 (six) hours as needed for Pain., Disp: , Rfl:     atovaquone-proguanil (MALARONE) 250-100 mg Tab, Take one tablet daily for malaria prevention. Begin one day before entering malarious area and continue for 1 week after return., Disp: 19 tablet, Rfl: 0    celecoxib (CELEBREX) 100 MG capsule, Take 1 capsule (100 mg total) by mouth 2 (two) times daily., Disp: 60 capsule, Rfl: 1    chlorthalidone (HYGROTEN) 25 MG Tab, Take 1 tablet (25 mg total) by mouth once daily., Disp: 90 tablet, Rfl: 1    omeprazole (PRILOSEC) 20 MG capsule, Take 20 mg by mouth once daily., Disp: , Rfl: 0    SHINGRIX, PF, 50 mcg/0.5 mL injection, inject 0.5 milliliter intramuscularly, Disp: , Rfl: 0    vitamin D 1000 units Tab, Take 1,000 Units by mouth once daily., Disp: , Rfl:     zolpidem (AMBIEN) 5 MG Tab, Take 1 tablet (5 mg total) by mouth nightly as needed., Disp: 30 tablet, Rfl: 2    rosuvastatin (CRESTOR) 10 MG tablet, Take 1 tablet (10 mg total) by mouth once daily., Disp: 90 tablet, Rfl: 3     Review of Systems:  Review of Systems   Constitutional: Negative for chills and fever.   HENT: Negative for nosebleeds.    Eyes: Negative for pain.   Respiratory: Negative for hemoptysis.    Cardiovascular: Negative for chest pain.   Gastrointestinal: Negative for nausea and vomiting.   Genitourinary: Negative for dysuria.   Musculoskeletal: Positive for back pain.   Skin: Negative for rash.   Neurological: Negative for tremors.   Endo/Heme/Allergies: Does not bruise/bleed easily.   Psychiatric/Behavioral: Negative for suicidal ideas.       Allergies:  Sulfacetamide      Medical History:  Past Medical History:   Diagnosis Date    DJD (degenerative joint disease)     Dyslipidemia     Hypertension     Nuclear sclerosis - Both Eyes 2012    Rosacea     Vitamin D deficiency disease         Surgical History:  Past Surgical History:   Procedure Laterality Date    APPENDECTOMY      BREAST BIOPSY Left     2015    BREAST BIOPSY Right     10/2015 ex bx- papilloma     breast biopsy, left      10/2015     SECTION, CLASSIC      Three times    CHOLECYSTECTOMY      JOINT REPLACEMENT Right     TKA    SHOULDER OPEN ROTATOR CUFF REPAIR Right     TOTAL KNEE ARTHROPLASTY      right knee        Social History:  Social History     Socioeconomic History    Marital status:      Spouse name: Not on file    Number of children: Not on file    Years of education: Not on file    Highest education level: Not on file   Social Needs    Financial resource strain: Not on file    Food insecurity - worry: Not on file    Food insecurity - inability: Not on file    Transportation needs - medical: Not on file    Transportation needs - non-medical: Not on file   Occupational History    Not on file   Tobacco Use    Smoking status: Never Smoker    Smokeless tobacco: Never Used   Substance and Sexual Activity    Alcohol use: Yes     Alcohol/week: 0.6 oz     Types: 1 Glasses of wine per week     Comment: approximately 2 glasses of wine weekly    Drug use: No    Sexual activity: Not on file   Other Topics Concern    Not on file   Social History Narrative    Not on file       Physical Exam:  There were no vitals filed for this visit.  General    Nursing note and vitals reviewed.  Constitutional: She is oriented to person, place, and time. She appears well-developed and well-nourished. No distress.   HENT:   Head: Normocephalic and atraumatic.   Nose: Nose normal.   Eyes: Conjunctivae and EOM are normal. Pupils are equal, round, and reactive to light. Right eye exhibits  no discharge. Left eye exhibits no discharge. No scleral icterus.   Neck: No JVD present.   Cardiovascular: Intact distal pulses.    Pulmonary/Chest: Effort normal. No respiratory distress.   Abdominal: She exhibits no distension.   Neurological: She is alert and oriented to person, place, and time. Coordination normal.   Psychiatric: She has a normal mood and affect. Her behavior is normal. Judgment and thought content normal.     General Musculoskeletal Exam   Gait: antalgic         Right Hip Exam     Tests   Pain w/ forced internal rotation (NAKITA): absent  Left Hip Exam     Tests   Pain w/ forced internal rotation (NAKITA): present      Back (L-Spine & T-Spine) / Neck (C-Spine) Exam     Tenderness Left paramedian tenderness of the Sacrum.     Back (L-Spine & T-Spine) Range of Motion   Extension: abnormal Back extension: facet loading positive on left.         Imaging:MRI Lumbar Spine Without Contrast   [id number] 11/30/17  05:58:48 [id number] ([facility]) : MRI LUMBAR SPINE WITHOUT CONTRAST    SUPPLIED CLINICAL HISTORY:  Low back pain    TECHNIQUE:  Multiplanar, multisequence images of the lumbar spine were obtained without the use of intravenous contrast.      COMPARISON: Lumbar spine radiograph 10/12/2017     FINDINGS:    There is grade 1 anterolisthesis of L4 on L5.  Vertebral body heights are well-maintained without signal abnormality to suggest acute fracture or infiltrative marrow placement process.  Intervertebral discs demonstrate multilevel degeneration with disc space height loss most prominent at L5-S1 and L1-L2.  Conus and lumbar spinal nerve roots are normal in signal.  Spinal cord terminates at the L1 vertebral level.  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    Limited evaluation of the surrounding soft tissues demonstrate no significant abnormalities.      T12-L1:  No focal disc abnormalities.  No significant spinal canal stenosis or neuroforaminal narrowing.    L1-2:  Circumferential disc bulge as well as mild  bilateral facet hypertrophy results in mild spinal canal stenosis as well as severe right and moderate left neuroforaminal narrowing.      L2-3:  Circumferential disc bulge as well as mild bilateral facet hypertrophy results in moderate spinal canal stenosis as well as moderate bilateral neuroforaminal narrowing.      L3-4:  Circumferential disc bulge as well as moderate bilateral facet hypertrophy and ligamentum flavum thickening results in mild spinal canal stenosis as well as mild right and moderate left neuroforaminal narrowing      L4-5:  Grade 1 anterolisthesis along with moderate bilateral facet hypertrophy and ligamentum flavum thickening results in moderate spinal canal stenosis as well as mild bilateral neuroforaminal narrowing.      L5-S1:  Mild circumferential disc bulge as well as mild bilateral facet hypertrophy results in moderate right and severe left neuroforaminal narrowing.   Impression    1.  Multilevel spondylosis of the lumbar spine results in moderate spinal canal stenosis at L2-3 and L4-5 as well as severe right neuroforaminal narrowing at L1-2 and severe left neuroforaminal narrowing at L5-S1.    2.  Grade one anterolisthesis of L4 on L5.     X-Ray Lumbar Spine Ap Lateral w/Flex Ext  5 views of the lumbar spine were obtained, with AP, lateral, lumbosacral lateral, and lateral flexion and extension images all submitted.    Mild scoliosis convex to the right in the lumbar region is seen, as is 7.5 mm of anterolisthesis of L4 in relation to L5, the latter alignment abnormality secondary to L4-5 facet arthropathy.  Alignment at other levels appears unremarkable, and there is no evidence of significant translational instability seen on the weight- bearing flexion/extension images.  Vertebral body heights are normally maintained, without compression deformity at any level.  There is prominent disc narrowing seen at every lumbar level, the most severe degrees of narrowing being at L1-2 and L5-S1,  with a vacuum phenomenon indicative of disc desiccation seen at the L5-S1 level.  Marginal vertebral endplate spurring is observed, particularly at L1-2 and L5-S1.  Facet arthropathy is seen at L5-S1, in addition to that previously mentioned at L4-5.  Vertebral endplates are well-defined.  No osseous destructive process.  Surgical clips are seen in the right upper quadrant, and calcification in the wall of the abdominal aorta, without definable aneurysm, is also incidentally observed.   Impression    Advanced multilevel degenerative changes are identified as discussed in detail above, with the disc narrowing and vertebral endplate spurring most pronounced at L1-2 and L5-S1, and with anterolisthesis seen at the L4-5 level on the basis of underlying facet arthropathy.  Although the findings are similar to the exam of 8/16/12, there has been some progression of this degenerative change since that time, particularly at the L1-2 level, which exhibits considerably greater disc narrowing on the current exam.  No compression fracture.  No translational instability.     Labs:  BMP  Lab Results   Component Value Date     07/09/2018    K 4.0 07/09/2018     07/09/2018    CO2 30 (H) 07/09/2018    BUN 19 07/09/2018    CREATININE 1.0 07/09/2018    CALCIUM 10.2 07/09/2018    ANIONGAP 8 07/09/2018    ESTGFRAFRICA >60.0 07/09/2018    EGFRNONAA 54.9 (A) 07/09/2018     Lab Results   Component Value Date    ALT 30 07/09/2018    AST 23 07/09/2018    ALKPHOS 73 07/09/2018    BILITOT 1.7 (H) 07/09/2018       Diagnoses & Associated Orders:  Problem List Items Addressed This Visit     None        76 yo F with axial right lower back pain due to SI dysfunction and facet arthopathy without evidence of radiculopathy at this time.  While severe DDD and NFS may be a component of her pain, exam and HPI is most consistent with SI as the primary pain generator as this time.     4/13/18 - I strongly encouraged her to resume yoga and  activity.  She previously used Celebrex for her pain and is amenable to trying it again.  Her labs show a renal function trend that is borderline normal vs CKD I and I will order a BMP today to verify that function is stable and appropriate.    7/16/18 - Patient requesting repeat SI injection which is appropriate as it is working well for management of her symptoms.    Recommendations & Interventions:   Procedures: S/P  SI injection x1 w/ 85% relief.   Medications:    - Cont Celebrex 100 mg BID   - I plan to reorder BMP every 6 months to assess renal fxn to ensure that it remains stable while on NSAID therapy  Imaging: No additional at this time  PT/OT: Restart yoga and patient will consider PT following vacation.  HEP: Cont walking daily as tolerated  Follow Up:  RTC as needed for returning or new pain    The above plan and management options were discussed at length with patient. Patient is in agreement with the above and verbalized understanding. Dr. Lockett was consulted on this patient  and agrees with this plan.    Derek Nava, MADISON-C  [facility]      Disclaimer: This note was partly generated using dictation software which may occasionally result in transcription errors.

## 2018-08-27 ENCOUNTER — OFFICE VISIT (OUTPATIENT)
Dept: PAIN MEDICINE | Facility: CLINIC | Age: 78
End: 2018-08-27
Payer: MEDICARE

## 2018-08-27 VITALS
BODY MASS INDEX: 29.21 KG/M2 | HEART RATE: 83 BPM | WEIGHT: 164.88 LBS | SYSTOLIC BLOOD PRESSURE: 160 MMHG | DIASTOLIC BLOOD PRESSURE: 80 MMHG

## 2018-08-27 DIAGNOSIS — N18.1 STAGE 1 CHRONIC KIDNEY DISEASE: ICD-10-CM

## 2018-08-27 DIAGNOSIS — M47.816 LUMBAR SPONDYLOSIS: ICD-10-CM

## 2018-08-27 DIAGNOSIS — M53.3 SACROILIAC JOINT DYSFUNCTION: Primary | ICD-10-CM

## 2018-08-27 PROCEDURE — 3079F DIAST BP 80-89 MM HG: CPT | Mod: CPTII,S$GLB,, | Performed by: NURSE PRACTITIONER

## 2018-08-27 PROCEDURE — 99999 PR PBB SHADOW E&M-EST. PATIENT-LVL III: CPT | Mod: PBBFAC,,, | Performed by: NURSE PRACTITIONER

## 2018-08-27 PROCEDURE — 3077F SYST BP >= 140 MM HG: CPT | Mod: CPTII,S$GLB,, | Performed by: NURSE PRACTITIONER

## 2018-08-27 PROCEDURE — 99213 OFFICE O/P EST LOW 20 MIN: CPT | Mod: S$GLB,,, | Performed by: NURSE PRACTITIONER

## 2018-08-28 ENCOUNTER — TELEPHONE (OUTPATIENT)
Dept: INTERNAL MEDICINE | Facility: CLINIC | Age: 78
End: 2018-08-28

## 2018-08-28 DIAGNOSIS — Z12.31 ENCOUNTER FOR SCREENING MAMMOGRAM FOR BREAST CANCER: Primary | ICD-10-CM

## 2018-08-28 NOTE — TELEPHONE ENCOUNTER
----- Message from Olga Lidia Merida sent at 8/28/2018 10:05 AM CDT -----  Contact: pt [phone]  Caller is requesting to schedule their annual screening mammogram appointment. Order is not listed in Epic.  Please enter order and contact patient to schedule.    Where would they like the mammogram performed?:  [place]   Additional information:

## 2018-09-04 ENCOUNTER — HOSPITAL ENCOUNTER (OUTPATIENT)
Dept: RADIOLOGY | Facility: HOSPITAL | Age: 78
Discharge: HOME | End: 2018-09-04
Attending: INTERNAL MEDICINE
Payer: MEDICARE

## 2018-09-04 DIAGNOSIS — Z12.31 ENCOUNTER FOR SCREENING MAMMOGRAM FOR BREAST CANCER: ICD-10-CM

## 2018-09-04 PROCEDURE — 77067 SCR MAMMO BI INCL CAD: CPT | Mod: 26,,, | Performed by: RADIOLOGY

## 2018-09-04 PROCEDURE — 77063 BREAST TOMOSYNTHESIS BI: CPT | Mod: 26,,, | Performed by: RADIOLOGY

## 2018-09-04 PROCEDURE — 77063 BREAST TOMOSYNTHESIS BI: CPT | Mod: TC

## 2018-09-05 ENCOUNTER — TELEPHONE (OUTPATIENT)
Dept: INTERNAL MEDICINE | Facility: CLINIC | Age: 78
End: 2018-09-05

## 2018-09-05 NOTE — TELEPHONE ENCOUNTER
----- Message from Rosemarie Brooks MD sent at 9/5/2018 12:39 PM CDT -----  Please note that your mammogram was read as follows  Impression:  There is no mammographic evidence of malignancy.  Rosemarie Perez

## 2018-10-12 RX ORDER — ZOLPIDEM TARTRATE 5 MG/1
TABLET ORAL
Qty: 30 TABLET | Refills: 0 | OUTPATIENT
Start: 2018-10-12

## 2018-10-12 RX ORDER — ZOLPIDEM TARTRATE 5 MG/1
TABLET ORAL
Qty: 30 TABLET | Refills: 0 | Status: SHIPPED | OUTPATIENT
Start: 2018-10-12 | End: 2018-11-13 | Stop reason: SDUPTHER

## 2018-10-22 ENCOUNTER — CLINICAL SUPPORT (OUTPATIENT)
Dept: INFECTIOUS DISEASES | Facility: CLINIC | Age: 78
End: 2018-10-22
Payer: MEDICARE

## 2018-10-22 DIAGNOSIS — Z23 IMMUNIZATION DUE: ICD-10-CM

## 2018-10-22 DIAGNOSIS — Z71.84 TRAVEL ADVICE ENCOUNTER: ICD-10-CM

## 2018-10-22 PROCEDURE — 90636 HEP A/HEP B VACC ADULT IM: CPT | Mod: PBBFAC,HCNC

## 2018-10-22 PROCEDURE — 90736 HZV VACCINE LIVE SUBQ: CPT | Mod: PBBFAC,HCNC

## 2018-10-22 PROCEDURE — 90472 IMMUNIZATION ADMIN EACH ADD: CPT | Mod: PBBFAC,HCNC

## 2018-10-22 NOTE — PROGRESS NOTES
Patient arrives for immunization injections of Hepatitis A & B (Recombinant) Vaccine/Twinrix and Adjuvant Suspension Component with Lyophilized gE Antigen Component/Shingrix - administered per order - patient understands need to remain on campus for at least 15 minutes and report any reaction to staff - left in no apparent distress

## 2018-11-02 ENCOUNTER — LAB VISIT (OUTPATIENT)
Dept: LAB | Facility: HOSPITAL | Age: 78
End: 2018-11-02
Attending: INTERNAL MEDICINE
Payer: MEDICARE

## 2018-11-02 ENCOUNTER — TELEPHONE (OUTPATIENT)
Dept: INTERNAL MEDICINE | Facility: CLINIC | Age: 78
End: 2018-11-02

## 2018-11-02 DIAGNOSIS — N18.1 CKD (CHRONIC KIDNEY DISEASE), SYMPTOM MANAGEMENT ONLY, STAGE 1: ICD-10-CM

## 2018-11-02 DIAGNOSIS — I10 ESSENTIAL HYPERTENSION, BENIGN: ICD-10-CM

## 2018-11-02 DIAGNOSIS — E78.5 HYPERLIPIDEMIA, UNSPECIFIED HYPERLIPIDEMIA TYPE: ICD-10-CM

## 2018-11-02 LAB
ALBUMIN SERPL BCP-MCNC: 3.9 G/DL
ALP SERPL-CCNC: 59 U/L
ALT SERPL W/O P-5'-P-CCNC: 20 U/L
ANION GAP SERPL CALC-SCNC: 6 MMOL/L
AST SERPL-CCNC: 19 U/L
BASOPHILS # BLD AUTO: 0.07 K/UL
BASOPHILS NFR BLD: 1.2 %
BILIRUB SERPL-MCNC: 1.2 MG/DL
BUN SERPL-MCNC: 18 MG/DL
CALCIUM SERPL-MCNC: 9.7 MG/DL
CHLORIDE SERPL-SCNC: 106 MMOL/L
CHOLEST SERPL-MCNC: 152 MG/DL
CHOLEST/HDLC SERPL: 3 {RATIO}
CO2 SERPL-SCNC: 29 MMOL/L
CREAT SERPL-MCNC: 0.9 MG/DL
DIFFERENTIAL METHOD: NORMAL
EOSINOPHIL # BLD AUTO: 0.2 K/UL
EOSINOPHIL NFR BLD: 3.8 %
ERYTHROCYTE [DISTWIDTH] IN BLOOD BY AUTOMATED COUNT: 12.6 %
EST. GFR  (AFRICAN AMERICAN): >60 ML/MIN/1.73 M^2
EST. GFR  (NON AFRICAN AMERICAN): >60 ML/MIN/1.73 M^2
GLUCOSE SERPL-MCNC: 94 MG/DL
HCT VFR BLD AUTO: 38.2 %
HDLC SERPL-MCNC: 50 MG/DL
HDLC SERPL: 32.9 %
HGB BLD-MCNC: 12.3 G/DL
IMM GRANULOCYTES # BLD AUTO: 0.02 K/UL
IMM GRANULOCYTES NFR BLD AUTO: 0.3 %
LDLC SERPL CALC-MCNC: 74.8 MG/DL
LYMPHOCYTES # BLD AUTO: 1.9 K/UL
LYMPHOCYTES NFR BLD: 32 %
MCH RBC QN AUTO: 30.2 PG
MCHC RBC AUTO-ENTMCNC: 32.2 G/DL
MCV RBC AUTO: 94 FL
MONOCYTES # BLD AUTO: 0.6 K/UL
MONOCYTES NFR BLD: 9.4 %
NEUTROPHILS # BLD AUTO: 3.2 K/UL
NEUTROPHILS NFR BLD: 53.3 %
NONHDLC SERPL-MCNC: 102 MG/DL
NRBC BLD-RTO: 0 /100 WBC
PLATELET # BLD AUTO: 209 K/UL
PMV BLD AUTO: 11.2 FL
POTASSIUM SERPL-SCNC: 3.9 MMOL/L
PROT SERPL-MCNC: 7 G/DL
RBC # BLD AUTO: 4.07 M/UL
SODIUM SERPL-SCNC: 141 MMOL/L
TRIGL SERPL-MCNC: 136 MG/DL
TSH SERPL DL<=0.005 MIU/L-ACNC: 3.74 UIU/ML
WBC # BLD AUTO: 6.04 K/UL

## 2018-11-02 PROCEDURE — 36415 COLL VENOUS BLD VENIPUNCTURE: CPT | Mod: HCNC,PO

## 2018-11-02 PROCEDURE — 84443 ASSAY THYROID STIM HORMONE: CPT | Mod: HCNC

## 2018-11-02 PROCEDURE — 80061 LIPID PANEL: CPT | Mod: HCNC

## 2018-11-02 PROCEDURE — 85025 COMPLETE CBC W/AUTO DIFF WBC: CPT | Mod: HCNC

## 2018-11-02 PROCEDURE — 80053 COMPREHEN METABOLIC PANEL: CPT | Mod: HCNC

## 2018-11-02 NOTE — TELEPHONE ENCOUNTER
----- Message from Rosemarie Brooks MD sent at 11/2/2018  2:41 PM CDT -----  Please review your lab work and we will discuss at your pending office visit.  Rosemarie Perez

## 2018-11-12 NOTE — PROGRESS NOTES
CC: Annual PE    78 y.o. female presents for PE  Non smoker  Social ETOH    HEALTH MAINTENANCE:  Cholesterol/labs: reviewed  C-scope: 6/2015-----------REC 5 yrs  One 3 mm polyp in the ascending colon. Resected   and retrieved. Tubular adenoma  - Melanosis in the colon.  - The examination was otherwise normal on direct   and retroflexion views.  EGD: 9/2015  Impression: - LA Grade B reflux esophagitis.  - Small hiatus hernia.  - Non-bleeding erosive gastropathy. Biopsied.  - Normal examined duodenum.  WWE: aged out, asx  Mammo: 2017, s/p bx- benign  DEXA: 9/2015, normal- update pending  EYE exam: UTD  DDS exam: UTD    VACCINATIONS:  TD: 2/2009  Flu: yearly  Pneumovax: 8/2014  Prevnar: 10/2015  Zostavax: 3/21/2011  Shingrix; completed 11/2018    MEDCARD: Reviewed  ROS:  No fever, chills, or night sweats  No visual disturbance or d/c  No ear or sinus pain or pressure  No dysphagia or early satiety  No chest pain or palpitations  No cough or wheezing  No PND or orthopnea  No GERD or abdominal pain  No change in bowels or blood in stool  No hematuria or dysuria;   No vaginal bleeding or pelvic pain or bloating  No skin rashes or lesions  No joint swelling or erythema  No unusual HA or focal deficits  No cold or heat intolerance  No increased thirst or urination  No unusual bruising or bleeding  ADL's: 100% independent  Memory: Good, delayed recall  Mental health: stress- moving to smaller home and having to go through her things  Not sleeping w/o Ambien  Advance Directives:+ will, need living will, and M/POA forms for AD given, pt completed and will return  Nutrtion: Good  Gait: No falls, not walking w/ move  Safety: Intact, just had incident w/ [employer]  impersonators  Urinary incontinence: n/a  Remainder of review negative except as previously noted    PMHX:Reviewed  PSHX: Reviewed  SHX: Reviewed      PE:  VS: As noted  GENERAL: Well developed well nourished, alert and oriented in NAD  Conversant and  co-operative  EYES: Conjunctiva and lids normal, sclera anicteric, PERRL, EOMI  ENT: Hearing intact; canals free of cerumen ,   TM's unremarkable  Nasal mucosa/turbinates/septum unremarkable;   oropharynx w/o lesions or stridor  Sinus nontender  NECK: Supple w/o thyromegaly or lymphadenopathy  RESPIRATORY: Efforts are unlabored; lungs are CTAP  CARDIOVASCULAR: Heart RRR w/o mumur, gallop or rub; No carotid bruits noted  1+ pedal pulses; no LE edema noted  GASTROINTESTINAL: Bowel sounds are present, soft, nontender, nondistended  No hepatosplenomegaly noted.  MUSCULOSKELETAL: Gait normal. No clubbing, cyanosis, or edema noted.  NEUROLOGIC: BALES. No tremor noted  SKIN: Warm and dry    IMPRESSION:  Annual PE  FHx: Breast cancer  HTN, stable  CKD , III, asx  Hypercholesterolemia,asx   Aortic atherosclerosis, asx  Ascending aorta- ULN 3.7cm  Insomnia, chronic  Hematuria    PLAN:   HD flu vaccine  DEXA  Repeat imaging of aorta - ULN, one year-   US ordered  Urine culture for hematuria  Exercise  Avoid salt  Resume BP med  Monitor BP  Continue present meds  Rx update  Vitamin D supplementation    Call prn  RTC 6 mos

## 2018-11-13 ENCOUNTER — OFFICE VISIT (OUTPATIENT)
Dept: INTERNAL MEDICINE | Facility: CLINIC | Age: 78
End: 2018-11-13
Payer: MEDICARE

## 2018-11-13 VITALS
BODY MASS INDEX: 29.69 KG/M2 | WEIGHT: 167.56 LBS | HEART RATE: 69 BPM | OXYGEN SATURATION: 99 % | SYSTOLIC BLOOD PRESSURE: 136 MMHG | DIASTOLIC BLOOD PRESSURE: 78 MMHG | RESPIRATION RATE: 18 BRPM | TEMPERATURE: 99 F | HEIGHT: 63 IN

## 2018-11-13 DIAGNOSIS — I70.0 AORTIC ATHEROSCLEROSIS: ICD-10-CM

## 2018-11-13 DIAGNOSIS — E78.5 HYPERLIPIDEMIA, UNSPECIFIED HYPERLIPIDEMIA TYPE: ICD-10-CM

## 2018-11-13 DIAGNOSIS — Z00.00 ANNUAL PHYSICAL EXAM: Primary | ICD-10-CM

## 2018-11-13 DIAGNOSIS — G47.00 INSOMNIA, UNSPECIFIED TYPE: ICD-10-CM

## 2018-11-13 DIAGNOSIS — R31.9 HEMATURIA, UNSPECIFIED TYPE: ICD-10-CM

## 2018-11-13 DIAGNOSIS — N18.30 CKD (CHRONIC KIDNEY DISEASE), STAGE III: ICD-10-CM

## 2018-11-13 DIAGNOSIS — Z78.0 POSTMENOPAUSAL ESTROGEN DEFICIENCY: ICD-10-CM

## 2018-11-13 DIAGNOSIS — I10 ESSENTIAL HYPERTENSION: ICD-10-CM

## 2018-11-13 PROCEDURE — 99499 UNLISTED E&M SERVICE: CPT | Mod: S$GLB,,, | Performed by: INTERNAL MEDICINE

## 2018-11-13 PROCEDURE — G0008 ADMIN INFLUENZA VIRUS VAC: HCPCS | Mod: HCNC,S$GLB,, | Performed by: INTERNAL MEDICINE

## 2018-11-13 PROCEDURE — 99397 PER PM REEVAL EST PAT 65+ YR: CPT | Mod: HCNC,25,S$GLB, | Performed by: INTERNAL MEDICINE

## 2018-11-13 PROCEDURE — 90662 IIV NO PRSV INCREASED AG IM: CPT | Mod: HCNC,S$GLB,, | Performed by: INTERNAL MEDICINE

## 2018-11-13 PROCEDURE — 3078F DIAST BP <80 MM HG: CPT | Mod: CPTII,HCNC,S$GLB, | Performed by: INTERNAL MEDICINE

## 2018-11-13 PROCEDURE — 87086 URINE CULTURE/COLONY COUNT: CPT | Mod: HCNC

## 2018-11-13 PROCEDURE — 3075F SYST BP GE 130 - 139MM HG: CPT | Mod: CPTII,HCNC,S$GLB, | Performed by: INTERNAL MEDICINE

## 2018-11-13 PROCEDURE — 99999 PR PBB SHADOW E&M-EST. PATIENT-LVL IV: CPT | Mod: PBBFAC,HCNC,, | Performed by: INTERNAL MEDICINE

## 2018-11-13 RX ORDER — CHLORTHALIDONE 25 MG/1
25 TABLET ORAL DAILY
Qty: 90 TABLET | Refills: 3 | Status: SHIPPED | OUTPATIENT
Start: 2018-11-13 | End: 2020-01-17 | Stop reason: SDUPTHER

## 2018-11-13 RX ORDER — ZOLPIDEM TARTRATE 5 MG/1
TABLET ORAL
Qty: 30 TABLET | Refills: 5 | Status: SHIPPED | OUTPATIENT
Start: 2018-11-13 | End: 2019-05-22 | Stop reason: CLARIF

## 2018-11-14 LAB — BACTERIA UR CULT: NO GROWTH

## 2018-11-16 ENCOUNTER — TELEPHONE (OUTPATIENT)
Dept: INTERNAL MEDICINE | Facility: CLINIC | Age: 78
End: 2018-11-16

## 2018-11-16 ENCOUNTER — CLINICAL SUPPORT (OUTPATIENT)
Dept: CARDIOLOGY | Facility: CLINIC | Age: 78
End: 2018-11-16
Attending: INTERNAL MEDICINE
Payer: MEDICARE

## 2018-11-16 ENCOUNTER — APPOINTMENT (OUTPATIENT)
Dept: RADIOLOGY | Facility: CLINIC | Age: 78
End: 2018-11-16
Attending: INTERNAL MEDICINE
Payer: MEDICARE

## 2018-11-16 DIAGNOSIS — Z78.0 POSTMENOPAUSAL ESTROGEN DEFICIENCY: ICD-10-CM

## 2018-11-16 DIAGNOSIS — I70.0 AORTIC ATHEROSCLEROSIS: ICD-10-CM

## 2018-11-16 LAB
ABDOMINAL IMA AP: 1.38 CM
ABDOMINAL IMA ED VEL: 17 CM/S
ABDOMINAL IMA PS VEL: 141 CM/S
ABDOMINAL IMA TRANS: 1.43 CM
ABDOMINAL INFRARENAL AORTA AP: 1.42 CM
ABDOMINAL INFRARENAL AORTA ED VEL: 0 CM/S
ABDOMINAL INFRARENAL AORTA PS VEL: 98 CM/S
ABDOMINAL INFRARENAL AORTA TRANS: 1.24 CM
ABDOMINAL JUXTARENAL AORTA AP: 1.49 CM
ABDOMINAL JUXTARENAL AORTA ED VEL: 0 CM/S
ABDOMINAL JUXTARENAL AORTA PS VEL: 103 CM/S
ABDOMINAL JUXTARENAL AORTA TRANS: 1.63 CM
ABDOMINAL LT COM ILIAC AP: 0.91 CM
ABDOMINAL LT COM ILIAC TRANS: 1 CM
ABDOMINAL LT COM ILIAC VEL: 140 CM/S
ABDOMINAL LT COM ILLIAC ED VEL: 0 CM/S
ABDOMINAL RT COM ILIAC AP: 1 CM
ABDOMINAL RT COM ILIAC TRANS: 1.07 CM
ABDOMINAL RT COM ILIAC VEL: 173 CM/S
ABDOMINAL RT COM ILLIAC ED VEL: 0 CM/S
ABDOMINAL SUPRARENAL AORTA AP: 2.66 CM
ABDOMINAL SUPRARENAL AORTA ED VEL: 15 CM/S
ABDOMINAL SUPRARENAL AORTA PS VEL: 64 CM/S
ABDOMINAL SUPRARENAL AORTA TRANS: 2.15 CM

## 2018-11-16 PROCEDURE — 77080 DXA BONE DENSITY AXIAL: CPT | Mod: 26,HCNC,, | Performed by: INTERNAL MEDICINE

## 2018-11-16 PROCEDURE — 93978 VASCULAR STUDY: CPT | Mod: HCNC,S$GLB,, | Performed by: INTERNAL MEDICINE

## 2018-11-16 PROCEDURE — 77080 DXA BONE DENSITY AXIAL: CPT | Mod: TC,HCNC,PO

## 2018-11-16 NOTE — TELEPHONE ENCOUNTER
----- Message from Rosemarie Brooks MD sent at 11/16/2018  4:14 PM CST -----  Please note that your abdominal ultrasound did NOT reveal an enlargement of your aorta.  Rosemarie Perez   15

## 2018-11-19 ENCOUNTER — TELEPHONE (OUTPATIENT)
Dept: INTERNAL MEDICINE | Facility: CLINIC | Age: 78
End: 2018-11-19

## 2018-11-19 NOTE — TELEPHONE ENCOUNTER
----- Message from Rosemarie Brooks MD sent at 11/16/2018  4:28 PM CST -----  Please note your urine culture did not reveal an infection.  Rosemarie Perez

## 2018-11-21 ENCOUNTER — PATIENT MESSAGE (OUTPATIENT)
Dept: INTERNAL MEDICINE | Facility: CLINIC | Age: 78
End: 2018-11-21

## 2018-11-24 ENCOUNTER — TELEPHONE (OUTPATIENT)
Dept: INTERNAL MEDICINE | Facility: CLINIC | Age: 78
End: 2018-11-24

## 2018-11-24 NOTE — TELEPHONE ENCOUNTER
----- Message from Rosemarie Brooks MD sent at 11/21/2018  4:34 PM CST -----  Please note that your bone density is normal    RECOMMENDATIONS of [facility]:    Calcium 2900-1403 mg daily and vitamin D 800-1000 units daily, adequate exercise.    Please do not hesitate to call/email if you have any questions or concerns  Rosemarie Perez

## 2019-01-11 DIAGNOSIS — M47.816 LUMBAR SPONDYLOSIS: ICD-10-CM

## 2019-01-11 DIAGNOSIS — M53.3 SI (SACROILIAC) JOINT DYSFUNCTION: ICD-10-CM

## 2019-01-11 DIAGNOSIS — M51.37 DDD (DEGENERATIVE DISC DISEASE), LUMBOSACRAL: ICD-10-CM

## 2019-01-11 RX ORDER — CELECOXIB 100 MG/1
100 CAPSULE ORAL 2 TIMES DAILY
Qty: 60 CAPSULE | Refills: 1 | Status: SHIPPED | OUTPATIENT
Start: 2019-01-11 | End: 2019-01-14 | Stop reason: SDUPTHER

## 2019-01-11 NOTE — TELEPHONE ENCOUNTER
----- Message from Aidee Deborah sent at 1/10/2019  1:39 PM CST -----  Contact: self, [phone] (M)  Patient requests her Celecoxib prescription refill approval sent to [facility] pharmacy on [address] [facility]. Please advise.

## 2019-01-14 ENCOUNTER — OFFICE VISIT (OUTPATIENT)
Dept: PAIN MEDICINE | Facility: CLINIC | Age: 79
End: 2019-01-14
Payer: MEDICARE

## 2019-01-14 VITALS
HEART RATE: 81 BPM | BODY MASS INDEX: 29.58 KG/M2 | WEIGHT: 167 LBS | SYSTOLIC BLOOD PRESSURE: 129 MMHG | DIASTOLIC BLOOD PRESSURE: 83 MMHG

## 2019-01-14 DIAGNOSIS — N18.30 CKD (CHRONIC KIDNEY DISEASE), STAGE III: ICD-10-CM

## 2019-01-14 DIAGNOSIS — M53.3 SI (SACROILIAC) JOINT DYSFUNCTION: ICD-10-CM

## 2019-01-14 DIAGNOSIS — M79.18 MYOFASCIAL PAIN SYNDROME: ICD-10-CM

## 2019-01-14 DIAGNOSIS — M51.37 DDD (DEGENERATIVE DISC DISEASE), LUMBOSACRAL: ICD-10-CM

## 2019-01-14 DIAGNOSIS — M47.816 LUMBAR SPONDYLOSIS: ICD-10-CM

## 2019-01-14 DIAGNOSIS — M47.816 LUMBAR FACET ARTHROPATHY: Primary | ICD-10-CM

## 2019-01-14 PROBLEM — M51.379 DDD (DEGENERATIVE DISC DISEASE), LUMBOSACRAL: Status: ACTIVE | Noted: 2019-01-14

## 2019-01-14 PROCEDURE — 99214 OFFICE O/P EST MOD 30 MIN: CPT | Mod: HCNC,S$GLB,, | Performed by: PAIN MEDICINE

## 2019-01-14 PROCEDURE — 99999 PR PBB SHADOW E&M-EST. PATIENT-LVL III: CPT | Mod: PBBFAC,HCNC,, | Performed by: PAIN MEDICINE

## 2019-01-14 PROCEDURE — 3079F PR MOST RECENT DIASTOLIC BLOOD PRESSURE 80-89 MM HG: ICD-10-PCS | Mod: CPTII,HCNC,S$GLB, | Performed by: PAIN MEDICINE

## 2019-01-14 PROCEDURE — 3079F DIAST BP 80-89 MM HG: CPT | Mod: CPTII,HCNC,S$GLB, | Performed by: PAIN MEDICINE

## 2019-01-14 PROCEDURE — 99999 PR PBB SHADOW E&M-EST. PATIENT-LVL III: ICD-10-PCS | Mod: PBBFAC,HCNC,, | Performed by: PAIN MEDICINE

## 2019-01-14 PROCEDURE — 3074F SYST BP LT 130 MM HG: CPT | Mod: CPTII,HCNC,S$GLB, | Performed by: PAIN MEDICINE

## 2019-01-14 PROCEDURE — 1101F PT FALLS ASSESS-DOCD LE1/YR: CPT | Mod: CPTII,HCNC,S$GLB, | Performed by: PAIN MEDICINE

## 2019-01-14 PROCEDURE — 99214 PR OFFICE/OUTPT VISIT, EST, LEVL IV, 30-39 MIN: ICD-10-PCS | Mod: HCNC,S$GLB,, | Performed by: PAIN MEDICINE

## 2019-01-14 PROCEDURE — 99499 RISK ADDL DX/OHS AUDIT: ICD-10-PCS | Mod: HCNC,S$GLB,, | Performed by: PAIN MEDICINE

## 2019-01-14 PROCEDURE — 3074F PR MOST RECENT SYSTOLIC BLOOD PRESSURE < 130 MM HG: ICD-10-PCS | Mod: CPTII,HCNC,S$GLB, | Performed by: PAIN MEDICINE

## 2019-01-14 PROCEDURE — 99499 UNLISTED E&M SERVICE: CPT | Mod: HCNC,S$GLB,, | Performed by: PAIN MEDICINE

## 2019-01-14 PROCEDURE — 1101F PR PT FALLS ASSESS DOC 0-1 FALLS W/OUT INJ PAST YR: ICD-10-PCS | Mod: CPTII,HCNC,S$GLB, | Performed by: PAIN MEDICINE

## 2019-01-14 RX ORDER — CELECOXIB 100 MG/1
100 CAPSULE ORAL 2 TIMES DAILY
Qty: 60 CAPSULE | Refills: 5 | Status: ON HOLD | OUTPATIENT
Start: 2019-01-14 | End: 2020-10-01 | Stop reason: HOSPADM

## 2019-01-14 RX ORDER — ROSUVASTATIN CALCIUM 10 MG/1
TABLET, COATED ORAL
Qty: 90 TABLET | Refills: 2 | Status: SHIPPED | OUTPATIENT
Start: 2019-01-14 | End: 2020-01-17 | Stop reason: SDUPTHER

## 2019-01-14 NOTE — PROGRESS NOTES
[facility] Established Patient Evaluation    Referred by: Dr. Rosemarie Hastings  Reason for referral: Left low back pain    CC:   Chief Complaint   Patient presents with    Hip Pain     left     Interval Update:  1/14/19 - Pt returns for a follow up. She reports 5/10 back pain in that area of the SI joints.  This pain has increased since the refill of her Celebrex was delayed due to issue at [facility].  She is requesting PT and refill of Celebrex a this combination works well to alleviate her pain.    8/27/18 - Pt returns today S/P LEFT Sacroiliac Joint Injection on 8/2/18 with 80% continued relief.  Patient states she is doing well, is scheduled for vacation for the next 2 weeks, discussed that we will consider physical therapy upon return.    7/16/18 - Patient returns to clinic for a 2 month follow up for lower back pain with a current pain score of 4/10.    5/18/18 - Patient returns to clinic following a LEFT Sacroiliac Joint Injection  Done 4/19/18 with 97% relief.  Her current pain score is 0/10.  She restarted yoga as discussed at our last visit; however, that is temporarily on hold pending a home renovation.    4/13/18 - Patient returns to clinic following a LEFT Sacroiliac Joint Injection done 12/14/17 with 60% relief.  She also inquires about limitations in activity specifically related to yoga and gym workouts.  She continues to have pain in the low back near the SI joint especially at night with pain going up to 8/10 and causing insomnia.    Background:  Zack Hopper is a pleasant 78 y.o. female who presented to my clinic complaining of lower back pain as characterized below.    Location: lower back   Severity: Currently: 7/10   Typical Range: 5-8/10     Exacerbation: 7/10   Onset: a few months ago  Quality: Aching  Radiation: Right posterior thigh  Axial/Extremity Percentage of Pain: 90/10  Exacerbating Factors: standing for more than 5 minutes and walking for more than 5  minutes  Mitigating Factors: ice and medications  Assoc: denies night fever/night sweats, urinary incontinence, bowel incontinence, significant weight loss, significant motor weakness and loss of sensations    Previous Therapies:  PT: Denies  HEP: Endorses daily walks  TENS:  Injections: 8/2/18 LEFT Sacroiliac Joint Injection                   2/14/17 LEFT Sacroiliac Joint Injection- 1 - 60% relief  Surgery: Denies  Medications:    - NSAIDS: Yes, ibuprofen & meloxicam   - MSK Relaxants:    - TCAs:    - SNRIs:    - Topicals:     Current Pain Medications:  1. Tylenol   2. Celebrex    Full Medication List:    Current Outpatient Medications:     acetaminophen (TYLENOL) 500 MG tablet, Take 500 mg by mouth every 6 (six) hours as needed for Pain., Disp: , Rfl:     celecoxib (CELEBREX) 100 MG capsule, Take 1 capsule (100 mg total) by mouth 2 (two) times daily., Disp: 60 capsule, Rfl: 1    chlorthalidone (HYGROTEN) 25 MG Tab, Take 1 tablet (25 mg total) by mouth once daily., Disp: 90 tablet, Rfl: 3    omeprazole (PRILOSEC) 20 MG capsule, Take 20 mg by mouth once daily., Disp: , Rfl: 0    rosuvastatin (CRESTOR) 10 MG tablet, Take 1 tablet (10 mg total) by mouth once daily., Disp: 90 tablet, Rfl: 3    SHINGRIX, PF, 50 mcg/0.5 mL injection, inject 0.5 milliliter intramuscularly, Disp: , Rfl: 0    vitamin D 1000 units Tab, Take 1,000 Units by mouth once daily., Disp: , Rfl:     zolpidem (AMBIEN) 5 MG Tab, TAKE 1 TABLET BY MOUTH NIGHTLY IF NEEDED FOR SLEEP, Disp: 30 tablet, Rfl: 5     Review of Systems:  Review of Systems   Constitutional: Negative for chills and fever.   HENT: Negative for nosebleeds.    Eyes: Negative for pain.   Respiratory: Negative for hemoptysis.    Cardiovascular: Negative for chest pain.   Gastrointestinal: Negative for nausea and vomiting.   Genitourinary: Negative for dysuria.   Musculoskeletal: Positive for back pain.   Skin: Negative for rash.   Neurological: Negative for tremors.    Endo/Heme/Allergies: Does not bruise/bleed easily.   Psychiatric/Behavioral: Negative for suicidal ideas.       Allergies:  Sulfacetamide     Medical History:  Past Medical History:   Diagnosis Date    DJD (degenerative joint disease)     Nuclear sclerosis - Both Eyes 2012    Rosacea     Vitamin D deficiency disease         Surgical History:  Past Surgical History:   Procedure Laterality Date    APPENDECTOMY      BIOPSY-EXCISIONAL  LEFT BREAST WITH WIRE LOCALIZATION  WIRE TO BE INSERTED  AT [place] Left 10/14/2015    Performed by Ashley Whittaker [facility] at [facility] OR 2ND FLR    BLOCK, SACROILIAC JOINT- Left - ORAL SEDATION Left 2018    Performed by Ricci Lockett Jr., MD at [facility] PAIN MGT    BLOCK-JOINT-SACROILIAC- left Left 2018    Performed by Ricci Lockett Jr., MD at [facility] PAIN [facility]GT    BLOCK-JOINT-SACROILIAC- Left Left 2017    Performed by Ricci Lockett Jr., MD at [facility] PAIN [facility]GT    BREAST BIOPSY Left     2015    BREAST BIOPSY Right     10/2015 ex bx- papilloma     breast biopsy, left      10/2015     SECTION, CLASSIC      Three times    CHOLECYSTECTOMY      COLONOSCOPY N/A 2015    Performed by Darek Martínez MD at [facility] ENDO (4TH FLR)    ESOPHAGOGASTRODUODENOSCOPY (EGD) N/A 2015    Performed by Darek Martínez MD at [facility] ENDO (4TH FLR)    JOINT REPLACEMENT Right     TKA    SHOULDER OPEN ROTATOR CUFF REPAIR Right     TOTAL KNEE ARTHROPLASTY      right knee        Social History:  Social History     Socioeconomic History    Marital status:      Spouse name: Not on file    Number of children: Not on file    Years of education: Not on file    Highest education level: Not on file   Social Needs    Financial resource strain: Not on file    Food insecurity - worry: Not on file    Food insecurity - inability: Not on file    Transportation needs - medical: Not on file    Transportation needs - non-medical: Not on file   Occupational History    Not on  file   Tobacco Use    Smoking status: Never Smoker    Smokeless tobacco: Never Used   Substance and Sexual Activity    Alcohol use: Yes     Alcohol/week: 0.6 oz     Types: 1 Glasses of wine per week     Comment: approximately 2 glasses of wine weekly    Drug use: No    Sexual activity: Not on file   Other Topics Concern    Not on file   Social History Narrative    Not on file       Physical Exam:  There were no vitals filed for this visit.  General    Nursing note and vitals reviewed.  Constitutional: She is oriented to person, place, and time. She appears well-developed and well-nourished. No distress.   HENT:   Head: Normocephalic and atraumatic.   Nose: Nose normal.   Eyes: Conjunctivae and EOM are normal. Pupils are equal, round, and reactive to light. Right eye exhibits no discharge. Left eye exhibits no discharge. No scleral icterus.   Neck: No JVD present.   Cardiovascular: Intact distal pulses.    Pulmonary/Chest: Effort normal. No respiratory distress.   Abdominal: She exhibits no distension.   Neurological: She is alert and oriented to person, place, and time. Coordination normal.   Psychiatric: She has a normal mood and affect. Her behavior is normal. Judgment and thought content normal.     General Musculoskeletal Exam   Gait: antalgic         Right Hip Exam     Tests   Pain w/ forced internal rotation (NAKITA): absent  Left Hip Exam     Tests   Pain w/ forced internal rotation (NAKITA): present      Back (L-Spine & T-Spine) / Neck (C-Spine) Exam     Tenderness Left paramedian tenderness of the Sacrum.     Back (L-Spine & T-Spine) Range of Motion   Extension: abnormal Back extension: facet loading positive on left.         Imaging:MRI Lumbar Spine Without Contrast   [id number] 11/30/17  05:58:48 [id number] ([facility]) : MRI LUMBAR SPINE WITHOUT CONTRAST    SUPPLIED CLINICAL HISTORY:  Low back pain    TECHNIQUE:  Multiplanar, multisequence images of the lumbar spine were obtained without the use of intravenous  contrast.      COMPARISON: Lumbar spine radiograph 10/12/2017     FINDINGS:    There is grade 1 anterolisthesis of L4 on L5.  Vertebral body heights are well-maintained without signal abnormality to suggest acute fracture or infiltrative marrow placement process.  Intervertebral discs demonstrate multilevel degeneration with disc space height loss most prominent at L5-S1 and L1-L2.  Conus and lumbar spinal nerve roots are normal in signal.  Spinal cord terminates at the L1 vertebral level.  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    Limited evaluation of the surrounding soft tissues demonstrate no significant abnormalities.      T12-L1:  No focal disc abnormalities.  No significant spinal canal stenosis or neuroforaminal narrowing.    L1-2:  Circumferential disc bulge as well as mild bilateral facet hypertrophy results in mild spinal canal stenosis as well as severe right and moderate left neuroforaminal narrowing.      L2-3:  Circumferential disc bulge as well as mild bilateral facet hypertrophy results in moderate spinal canal stenosis as well as moderate bilateral neuroforaminal narrowing.      L3-4:  Circumferential disc bulge as well as moderate bilateral facet hypertrophy and ligamentum flavum thickening results in mild spinal canal stenosis as well as mild right and moderate left neuroforaminal narrowing      L4-5:  Grade 1 anterolisthesis along with moderate bilateral facet hypertrophy and ligamentum flavum thickening results in moderate spinal canal stenosis as well as mild bilateral neuroforaminal narrowing.      L5-S1:  Mild circumferential disc bulge as well as mild bilateral facet hypertrophy results in moderate right and severe left neuroforaminal narrowing.   Impression    1.  Multilevel spondylosis of the lumbar spine results in moderate spinal canal stenosis at L2-3 and L4-5 as well as severe right neuroforaminal narrowing at L1-2 and severe left neuroforaminal narrowing at L5-S1.    2.  Grade one  anterolisthesis of L4 on L5.     X-Ray Lumbar Spine Ap Lateral w/Flex Ext  5 views of the lumbar spine were obtained, with AP, lateral, lumbosacral lateral, and lateral flexion and extension images all submitted.    Mild scoliosis convex to the right in the lumbar region is seen, as is 7.5 mm of anterolisthesis of L4 in relation to L5, the latter alignment abnormality secondary to L4-5 facet arthropathy.  Alignment at other levels appears unremarkable, and there is no evidence of significant translational instability seen on the weight- bearing flexion/extension images.  Vertebral body heights are normally maintained, without compression deformity at any level.  There is prominent disc narrowing seen at every lumbar level, the most severe degrees of narrowing being at L1-2 and L5-S1, with a vacuum phenomenon indicative of disc desiccation seen at the L5-S1 level.  Marginal vertebral endplate spurring is observed, particularly at L1-2 and L5-S1.  Facet arthropathy is seen at L5-S1, in addition to that previously mentioned at L4-5.  Vertebral endplates are well-defined.  No osseous destructive process.  Surgical clips are seen in the right upper quadrant, and calcification in the wall of the abdominal aorta, without definable aneurysm, is also incidentally observed.   Impression    Advanced multilevel degenerative changes are identified as discussed in detail above, with the disc narrowing and vertebral endplate spurring most pronounced at L1-2 and L5-S1, and with anterolisthesis seen at the L4-5 level on the basis of underlying facet arthropathy.  Although the findings are similar to the exam of 8/16/12, there has been some progression of this degenerative change since that time, particularly at the L1-2 level, which exhibits considerably greater disc narrowing on the current exam.  No compression fracture.  No translational instability.     Labs:  BMP  Lab Results   Component Value Date     11/02/2018    K 3.9  11/02/2018     11/02/2018    CO2 29 11/02/2018    BUN 18 11/02/2018    CREATININE 0.9 11/02/2018    CALCIUM 9.7 11/02/2018    ANIONGAP 6 (L) 11/02/2018    ESTGFRAFRICA >60.0 11/02/2018    EGFRNONAA >60.0 11/02/2018     Lab Results   Component Value Date    ALT 20 11/02/2018    AST 19 11/02/2018    ALKPHOS 59 11/02/2018    BILITOT 1.2 (H) 11/02/2018       Diagnoses & Associated Orders:  Problem List Items Addressed This Visit     Lumbar spondylosis    Relevant Medications    celecoxib (CELEBREX) 100 MG capsule    Other Relevant Orders    Ambulatory Referral to Physical/Occupational Therapy    Myofascial pain syndrome    SI (sacroiliac) joint dysfunction    Relevant Medications    celecoxib (CELEBREX) 100 MG capsule    Other Relevant Orders    Ambulatory Referral to Physical/Occupational Therapy    CKD (chronic kidney disease), stage III    Lumbar facet arthropathy - Primary    DDD (degenerative disc disease), lumbosacral    Relevant Medications    celecoxib (CELEBREX) 100 MG capsule    Other Relevant Orders    Ambulatory Referral to Physical/Occupational Therapy        76 yo F with axial right lower back pain due to SI dysfunction and facet arthopathy without evidence of radiculopathy at this time.  While severe DDD and NFS may be a component of her pain, exam and HPI is most consistent with SI as the primary pain generator as this time.     4/13/18 - I strongly encouraged her to resume yoga and activity.  She previously used Celebrex for her pain and is amenable to trying it again.  Her labs show a renal function trend that is borderline normal vs CKD I and I will order a BMP today to verify that function is stable and appropriate.    7/16/18 - Patient requesting repeat SI injection which is appropriate as it is working well for management of her symptoms.    1/14/19 - Patient will restart celebrex and undergo short course of PT to reduce irritation in the SI joints.    Recommendations & Interventions:    Procedures: S/P  SI injection x1 w/ 85% relief.  May repeat in the future as needed.  Medications:    - Cont and Refill Celebrex 100 mg BID   - I plan to reorder BMP every 6 months to assess renal fxn to ensure that it remains stable while on NSAID therapy  Imaging: No additional at this time  PT/OT: Restart yoga and patient will consider PT following vacation.  HEP: Cont walking daily as tolerated  Follow Up:  RTC as needed for returning or new pain    Ricci Lockett Jr, MD  Interventional Pain Medicine / Anesthesiology      Disclaimer: This note was partly generated using dictation software which may occasionally result in transcription errors.

## 2019-01-22 ENCOUNTER — CLINICAL SUPPORT (OUTPATIENT)
Dept: REHABILITATION | Facility: HOSPITAL | Age: 79
End: 2019-01-22
Attending: PAIN MEDICINE
Payer: MEDICARE

## 2019-01-22 DIAGNOSIS — R53.1 WEAKNESS: Primary | ICD-10-CM

## 2019-01-22 PROCEDURE — 97110 THERAPEUTIC EXERCISES: CPT | Mod: HCNC,PO

## 2019-01-22 PROCEDURE — 97162 PT EVAL MOD COMPLEX 30 MIN: CPT | Mod: HCNC,PO

## 2019-01-22 NOTE — PLAN OF CARE
Physical Therapy Evaluation    Name: Zack Hopper  Clinic Number: [id number]    Diagnosis:   Encounter Diagnosis   Name Primary?   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    celecoxib (CELEBREX) 100 MG capsule Take 1 capsule (100 mg total) by mouth 2 (two) times daily.    chlorthalidone (HYGROTEN) 25 MG Tab Take 1 tablet (25 mg total) by mouth once daily.    rosuvastatin (CRESTOR) 10 MG tablet TAKE 1 TABLET BY MOUTH EVERY DAY    SHINGRIX, PF, 50 mcg/0.5 mL injection inject 0.5 milliliter intramuscularly    vitamin D 1000 units Tab Take 1,000 Units by mouth once daily.    zolpidem (AMBIEN) 5 MG Tab TAKE 1 TABLET BY MOUTH NIGHTLY IF NEEDED FOR SLEEP     No current facility-administered medications for this visit.      Review of patient's allergies indicates:   Allergen Reactions    Sulfacetamide      Other reaction(s): Unknown       Evaluation Date: 1/22/19  Visit # authorized: 1/20  Authorization period: 12/31/19  Plan of care expiration: 3/21/19    Subjective   PMH: neck pain/left shoulder pain, PSH: right TKA  History of present condition:  Zack is a 78 y.o. female that presents to [facility] secondary to chronic low back and left SIJ pain. Injury/surgery occurred approximately: ~1 year ago. Pt. presents with the following co-morbidities and personal factors that directly impact her plan of care: chronicity of condition.        Precautions: standard    Onset/REYNA: gradual: chronic low back with radiation to right posterior thigh for about ~1 year. Pt. describes the pain as achy. About a year ago when  she visited her grandson and she noted increased pain due to extensive walking and negotiating hills in [place]. Went to a chiropractor while away; x-ray performed: noting DDD/DJD and gave recommendations on diet/exercise. Pt. returned home and followed up with PCP who referred her to Pain management. Pt. has undergone several injections with temporary. Current symptoms include left SIJ pain and central LBP and decreased flexibility/stiffness associated with decreased activity. Notes pain first thing in the morning (stiffness), improves with movement, and worsens at night.   Red Flags:  · Bowel/bladder symptoms (urinary retention/fecal incontinence)? No  · Recent weight loss? No.  · Constant/Night pain that is unchanging with change of position? No.  · PMH of [place]? No.   · Numbness or Tingling? No.    Aggravating factors: walking/standing: greater than 5 minutes, stair negotiation: non-reciprocal, sleep disturbance, moderate activities: household and leisure, prolonged sitting, transitional activities after prolonged sitting I.e. after driving, floor transfers after performing yoga, bending/stooping, and lifting  Relieving factors: ice and celebrex; yoga/stretching (inconsistently)  Pain Scale: Zack rates pain on a scale of 0-10 to be 3 at currently; 3 at best; 7 at worst.    Diagnostic tests: MRI revealed: Advanced multilevel degenerative changes are identified as discussed in detail above, with the disc narrowing and vertebral endplate spurring most pronounced at L1-2 and L5-S1, and with anterolisthesis seen at the L4-5 level on the basis of underlying facet arthropathy.  Although the findings are similar to the exam of 8/16/12, there has been some progression of this degenerative change since that time, particularly at the L1-2 level, which exhibits considerably greater disc narrowing on the current exam.    [facility] [place] both [place] with her ; walking for exercise outdoors: ~1  hour (4x/wk) has not perform for 4 months  DME own/use: [employer]  Occupation: Pt is retired    Previous treatment: left SIJ injections: 12/2017, 4/2018, and 8/2018.  ADLs: Pt has a decrease ability to perform ADLs such as see above.    Patient Goals: Pt would like to decrease pain and increase function with improved joint mobility/flexibility and learn symptom management    Objective     Observation: deconditioned; standing/supine: right ilial anterior and left ilial posterior    Posture: kyphotic    Lumbar ROM: (measured in degrees)    Degrees Quality   Flexion       Extension 5      Left Side Bending 25    Right Side Bending 15    Left rotation 50%    Right Rotation 75%      Active/Passive Hip ROM: (measured in degrees)    RLE LLE   ER /20 /20   IR /15 /15     Dermatomes: (impaired/normal)     RLE LLE   L2 Intact Intact   L3 Intact Intact   L4 Intact Intact   L5 Intact Intact   S1 Intact Intact     Reflexes: L3: 2 bilaterally; unable to obtain bilateral S1 reflex    Lower Extremity Strength (graded 0-5 out of 5)   RLE LLE   Hip flexion: 4+/5 4-/5   Hip ER 4+/5 4/5   Hip IR 4/5 3/5 * left SIJ   Knee extension: 4+/5 4+/5   Ankle dorsiflexion: 5/5 5/5   Great toe extension: 4+/5 3-/5   Posterior fibers of Gluteus medius 3+/5 3/5   Knee flexion 5/5 5/5   Glute max 4+/5 4/5   Ankle plantarflexion 5/5 5/5   Hip extension: 4/5 4-/5     Special Tests: ((+): pos.; (-): neg.)   · Slump Test: - bilaterally  · SLR Test: 90 bilaterally  · Bridge Test: +  · Pirformis Test: -  · NAKITA left: + SIJ pain  · Scour bilateral hips: -    Flexibility:   · Popliteal Angle: 90 deg. bilateral     Palpation for condition:   · Position: right ilial anterior and left ilial posterior    · Warmth:   · Swelling:   · Texture: hypertonic bilateral: GM, GMin, and piriformis (left greater than right)    Joint Mobility: (graded 0-6 out of 6) L5/S1: 2/6    Functional Status Measures:    Intake Score     Pts Physical FS Primary Measure      54                 "          Risk Adjustment Statistical FOTO     45        PT reviewed FOTO scores for Zack Hopper on 01/22/2019.   FOTO scores were entered into [employer] - see media section.    History  Co-morbidities and personal factors that may impact the plan of care Examination  Body Structures and Functions, activity limitations and participation restrictions that may impact the plan of care Clinical Presentation   Decision Making/ Complexity Score   Co-morbidities:   chronicity of condition            Personal Factors:   none Body Regions: Lumbar spine and SIJ    Body Systems: Decreased AROM; pelvic dysfunction; core hip lumbopelvic weakness; poor posture; pain with transitional activities, decrease exercise ability, and pain with ADLs.     Activity limitations: bending/stooping, lifting, prolonged sitting/standing, community ambulation, household tasks, moderate actvities, and transitional activities    Participation Restrictions: leisure work out evolving with changing clinical characteristics   moderate     Clinical Presentation/complexity category  Moderate complexity category: pt. has 1-2 personal factors and/or co-morbidities directly  impacting POC, 3 or more body system impairments/functional limitations/participation restrictions; as well as, condition is evolving with changing clinical characteristics.    PT Evaluation Completed? Yes  Discussed Plan of Care with patient: Yes    TREATMENT:  Therapeutic exercise: Zack received therapeutic exercises to develop strength and endurance, flexibility for 10 minutes including: View at "[email or web address]" using code: [email or web address]    Pt. Education: Instructed pt. regarding:body mechanics, posture, activity modification/avoidance, and proper technique with all exercises. Pt. to demonstrate good understanding of the education provided. Zack demonstrated good return demonstration of activities. No cultural, environmental, or spiritual barriers identified to treatment or " learning.    Medical necessity is demonstrated by the following IMPAIRMENTS/PROBLEM LIST:   1) Pain limiting function   2) Posture dysfunction   3) Core/Lumbar/LE weakness   4) Decreased thoracic/lumbar/lumbosacral joint mobility   5) Decreased Lumbar ROM   6) Decreased soft tissue extensibility/fascia restriction   7) Decreased LE flexibility: bilateral piriformis   8) Lack of HEP   9) Sacroiliac dysfunction    GOALS:   Short Term Goals:  4 weeks  1. Report decreased lumbosacral pain </= 5/10 at worst to increase tolerance for prolonged sitting/standing  2. Pt. to demonstrate proper cervical and scapula retraction requiring min. to no verbal cues from PT  3. Pt. to demonstrate increased MMT for core/lumbar paraspinals to 3/5 to increase endurance with prolonged sitting/standing.  4. Pt. to demonstrate increased MMT for left gluteus medius to 3+/5  to increase stability during community ambulation  5. Pt. to demonstrate increased MMT for left hip flexor to 4/5 to increase ability to clear toes during gait/stair negotiation.   6. Pt to tolerate HEP to improve ROM and independence with ADL's  7. Pt. to be independent with symptom management    Long Term Goals: 8 weeks  1. Report decreased lumbosacral pain </=  2/10 at worst to increase tolerance for prolonged sitting/standing  2. Pt. to demonstrate proper cervical and scapula retraction requiring no verbal cues from PT  3. Increase thoracic joint mobility to 3-/6 to promote greater ease with self care skills  4. Increase lumbar joint mobility to 3-/6 to promote greater ease with prolonged sitting/standing  5. Pt. to demonstrate increased MMT for core/lumbar paraspinals to 3+/5 to increase endurance with prolonged sitting.   6. Pt. to demonstrate increased MMT for bilateral gluteus medius to 4+/5  to increase stability during ambulation on uneven surfaces.  7. Pt. to demonstrate increased MMT for bilateral hip flexor to 4+/5 to increase tolerance for ADL and work  activities.   8. Pt to be independent with HEP to improve ROM and independence with ADL's  9. Pt. to be able to resume leisure work out with manageable symptoms  Assessment   This is a 78 y.o. female referred to outpatient physical therapy who presents with a medical diagnosis of lumbar spondylosis, DDD, and SIJ dysfunction and PT diagnosis of weakness demonstrating joint dysfunction and functional limitation as described above. Level of complexity is moderate; based on patient's past medical history including the above co-morbidities and personal factors; functional limitations, and clinical presentation directly impacting his/her plan of care. Pt demonstrates good rehab potential.    Patient presents with signs and symptoms consistent with the above diagnoses. Patient was in agreement with set goals and plan of care. Pt will benefit from physical therapy services in order to maximize pain free functional independence.     Plan     Pt will be treated by physical therapy 1-3 times a week for 8 weeks for pt. education, HEP, aquatic therapy if land based therapy is not tolerable, therapeutic exercises, neuromuscular re-education, soft tissue and joint mobilizations; and modalities, including but not exclusive to dry needling, prn to achieve established goals. Zack may at times be seen by a PTA as part of the Rehab Team.     I certify the need for these services furnished under this plan of treatment and while under my care.______________________________ Physician/Referring Practitioner  Date of Signature

## 2019-01-24 NOTE — PROGRESS NOTES
"                                                    Physical Therapy Progress Note     Name: Zack Pavon [facility] Number: [id number]  Diagnosis:   Encounter Diagnosis   Name Primary?   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 Denies being able to perform her exercises due to caring for her  that has had a UTI. Her  is preparing for his surgery this coming week.    Pain Scale: before treatment: 6 currently; after treatment: 3    Objective     ROM: before treatment: ; after treatment: NT    Patient received individual therapy to increase strength, endurance, ROM, flexibility, posture and core stabilization with 1 patients with activities as follows:     Therapeutic exercise: Zack received therapeutic exercises to develop strength, endurance, ROM, flexibility and posture for 25 minutes including:     Supine:   · piriformis stretch bilaterally: 3x30"  · DKTC: 3x30 sec.   · push/pull (right anterior/left ilial posterior): 3x5"  · TA brace with isometric hip adduction/abduction with pilates ring: 2x8  · TA brace with knee fall out: 2x8  Sidelying:   · s/l clams: 2x8  Sitting:    Standing:  · Paloff press with YTB: 2x8 bilaterally    Manual therapy: Zack received the following manual therapy techniques: Joint mobilizations and Soft tissue Mobilization were applied to: piriformis and lumbar spine for 15 minutes.    Manual techniques include:  Soft tissue mobilization:  · prone:    · lamina release:   · IASTM: sciatic nerve bed, " lumbar para, QL, superior to iliac crest  Joint mobilization:  · p/a Tspine:   · p/a Lspine:    Written Home Exercises Provided: review of current exercise regimen  Pt demo good understanding of the education provided. Zack demonstrated good and fair return demonstration of activities.     Pt. education:  · Posture reeducation, body mechanics, HEP, activity modification/avoidance  · No spiritual or educational barriers to learning provided  · Pt has no cultural, educational or language barriers to learning provided.    Assessment     Pt. noted some relief with manual therapy and exercises. Pt. required recurrent verbal cues for proper technique with exercises. Pt. is slowly progressing towards goals. Pt will continue to benefit from skilled outpatient physical therapy to address the remaining functional deficits, provide pt/family education, and to maximize pt's level of independence in the home and community environment. .     Anticipated barriers to physical therapy: chronicity of condition    · Pt's spiritual, cultural and educational needs considered and pt agreeable to plan of care and goals as stated below:   Please see IE for goals; no new/revised goals     Plan   Continue with established Plan of Care towards PT goals.

## 2019-01-25 ENCOUNTER — CLINICAL SUPPORT (OUTPATIENT)
Dept: REHABILITATION | Facility: HOSPITAL | Age: 79
End: 2019-01-25
Attending: PAIN MEDICINE
Payer: MEDICARE

## 2019-01-25 DIAGNOSIS — R53.1 WEAKNESS: ICD-10-CM

## 2019-01-25 PROCEDURE — 97110 THERAPEUTIC EXERCISES: CPT | Mod: HCNC,PO

## 2019-01-28 ENCOUNTER — CLINICAL SUPPORT (OUTPATIENT)
Dept: REHABILITATION | Facility: HOSPITAL | Age: 79
End: 2019-01-28
Attending: PAIN MEDICINE
Payer: MEDICARE

## 2019-01-28 DIAGNOSIS — R53.1 WEAKNESS: ICD-10-CM

## 2019-01-28 PROCEDURE — 97110 THERAPEUTIC EXERCISES: CPT | Mod: HCNC,PO

## 2019-01-28 PROCEDURE — 97140 MANUAL THERAPY 1/> REGIONS: CPT | Mod: HCNC,PO

## 2019-01-28 NOTE — PROGRESS NOTES
"                                                    Physical Therapy Progress Note     Name: Zack Pavon [facility] Number: [id number]  Diagnosis:   Encounter Diagnosis   Name Primary?   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 Reports stiffness isn't as bad today.     Pain Scale: before treatment: 4 currently; after treatment: 3    Objective     Patient received individual therapy to increase strength, endurance, ROM, flexibility, posture and core stabilization with 1 patients with activities as follows:     Therapeutic exercise: Zack received therapeutic exercises to develop strength, endurance, ROM, flexibility and posture for 30 minutes including:     Supine:   · piriformis stretch bilaterally: 3x30"  · DKTC: 3x30 sec.   · push/pull (right anterior/left ilial posterior): 3x5"  · TA brace with isometric hip adduction/abduction with pilates ring: 2x10  · TA brace with knee fall out: 2x8, YTB  · Bridges 2 x 8, pelvic tilt first - patient reports these feel good on her back.   Sidelying:   · s/l clams: 2x10  · S/l hip abduction 2 x 8  · Reverse clams 2 x 8   Sitting:    Standing:  · Paloff press with YTB: 2x10 bilaterally    Manual therapy: Zack received the following manual therapy techniques: Joint mobilizations and Soft tissue Mobilization were applied to: piriformis and lumbar spine for 15 minutes.    Manual techniques include:  Soft tissue mobilization:  · prone:    · lamina release: - NP  · STM: sciatic nerve bed, lumbar para, QL, superior to " iliac crest  Joint mobilization: - NP  · p/a Tspine:   · p/a Lspine:     Written Home Exercises Provided: bridges, clamshells, knee fallouts   Pt demo good understanding of the education provided. Zack demonstrated good and fair return demonstration of activities.     Pt. education:  · Posture reeducation, body mechanics, HEP, activity modification/avoidance  · No spiritual or educational barriers to learning provided  · Pt has no cultural, educational or language barriers to learning provided.    Assessment     Pt. Tolerated additional exercises and progression of above bolded exercises well today with no onset of adverse effects. Patient notes bridges with pelvic tilt felt good on her back. Patient notes relief with manual therapy. Patient requires verbal cueing to brace abdominals during pilates ring exercises. Patient requires verbal and tactile cueing for proper form during SL clamshells. Patient with 1/4 inch leg length discrepancy. Pt will continue to benefit from skilled outpatient physical therapy to address the remaining functional deficits, provide pt/family education, and to maximize pt's level of independence in the home and community environment. .     Anticipated barriers to physical therapy: chronicity of condition    · Pt's spiritual, cultural and educational needs considered and pt agreeable to plan of care and goals as stated below:   Please see IE for goals; no new/revised goals     Plan   Continue with established Plan of Care towards PT goals.

## 2019-01-30 ENCOUNTER — CLINICAL SUPPORT (OUTPATIENT)
Dept: REHABILITATION | Facility: HOSPITAL | Age: 79
End: 2019-01-30
Attending: PAIN MEDICINE
Payer: MEDICARE

## 2019-01-30 ENCOUNTER — TELEPHONE (OUTPATIENT)
Dept: OPHTHALMOLOGY | Facility: CLINIC | Age: 79
End: 2019-01-30

## 2019-01-30 DIAGNOSIS — R53.1 WEAKNESS: ICD-10-CM

## 2019-01-30 PROCEDURE — 97140 MANUAL THERAPY 1/> REGIONS: CPT | Mod: HCNC,PO

## 2019-01-30 PROCEDURE — 97110 THERAPEUTIC EXERCISES: CPT | Mod: HCNC,PO

## 2019-01-30 NOTE — PROGRESS NOTES
"                                                    Physical Therapy Progress Note     Name: Zack Pavon [facility] Number: [id number]  Diagnosis:   Encounter Diagnosis   Name Primary?   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 States that she purchased a heel lift as directed by primary PT.     Pain Scale: before treatment: 3 currently; after treatment: 0/10    Objective     Patient received individual therapy to increase strength, endurance, ROM, flexibility, posture and core stabilization with 1 patients with activities as follows:     Therapeutic exercise: Zack received therapeutic exercises to develop strength, endurance, ROM, flexibility and posture for 40 minutes including:     Supine:   · piriformis stretch bilaterally: 3x30"  · DKTC: 3x30 sec.   · push/pull (right anterior/left ilial posterior): 3x5"  · TA brace with isometric hip adduction/abduction with pilates ring: 2x10  · TA brace with knee fall out: 2x10, OTB  · Bridges 2 x 10, pelvic tilt first - patient reports these feel good on her back.   Sidelying:   · s/l clams: 2x10  · S/l hip abduction 2 x 10  · Reverse clams 2 x 10  Sitting:    Standing:  · Anti-rotational walk out to Paloff press with OTB 2x10 bilaterally    Manual therapy: Zack received the following manual therapy techniques: Joint mobilizations and Soft tissue Mobilization were applied to: piriformis and lumbar spine for 15 minutes. " "   Manual techniques include:  Soft tissue mobilization:  · prone:    · lamina release: - NP  · STM: sciatic nerve bed, lumbar para, QL, superior to iliac crest  · Piriformis release on (L)  · Stick massage (L) pirifomris  Joint mobilization:    · p/a Tspine:   · p/a Lspine:   · Long axis distraction (B)LE     Written Home Exercises Provided:Patient educated to continue with previously issued HEP to tolerance.  Pt demo good understanding of the education provided. Zack demonstrated good and fair return demonstration of activities.     Pt. education:  · Posture reeducation, body mechanics, HEP, activity modification/avoidance  · No spiritual or educational barriers to learning provided  · Pt has no cultural, educational or language barriers to learning      Assessment      Patient joelle Tx fairly well. She was able to perform and progress slightly with the above ex's/activities within tolerable muscular fatigue but without increased pain. She reports good relief with manual techniques. She continues to present with pelvic obliquity ( Anterior rotation on (R) resulting in leg length discrepancy. She brought in heel lift for her (L) shoe as directed by primary PT (~1/4") which was applied to her shoe--she reports no adverse affects with this but states it will take a little getting used to. Will monitor for its longer term effectiveness.   Pt will continue to benefit from skilled outpatient physical therapy to address the remaining functional deficits, provide pt/family education, and to maximize pt's level of independence in the home and community environment. .     Anticipated barriers to physical therapy: chronicity of condition    · Pt's spiritual, cultural and educational needs considered and pt agreeable to plan of care and goals as stated below:   Please see IE for goals; no new/revised goals     Plan   Continue with established Plan of Care towards PT goals with focus on core /LE strength , flexibility ex's. " .

## 2019-01-30 NOTE — TELEPHONE ENCOUNTER
----- Message from Lurdes Walker sent at 1/30/2019  2:46 PM CST -----  Contact: Zack Hopper   Pt would like to speak with  nurse to scheduled the a follow up ,pt can be reached at [phone] please thank you.

## 2019-02-04 ENCOUNTER — CLINICAL SUPPORT (OUTPATIENT)
Dept: REHABILITATION | Facility: HOSPITAL | Age: 79
End: 2019-02-04
Attending: PAIN MEDICINE
Payer: MEDICARE

## 2019-02-04 DIAGNOSIS — R53.1 WEAKNESS: ICD-10-CM

## 2019-02-04 PROCEDURE — 97110 THERAPEUTIC EXERCISES: CPT | Mod: HCNC,PO

## 2019-02-04 NOTE — PROGRESS NOTES
"                                                    Physical Therapy Progress Note     Name: Zack Pavon [facility] Number: [id number]  Diagnosis:   Encounter Diagnosis   Name Primary?   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 Notes that she hasn't really noticed a difference with the heel lift.     Pain Scale: before treatment: 3 currently; after treatment: 0/10    Objective     Patient received individual therapy to increase strength, endurance, ROM, flexibility, posture and core stabilization with 1 patients with activities as follows:     Therapeutic exercise: Zack received therapeutic exercises to develop strength, endurance, ROM, flexibility and posture for 40 minutes including:     warm-up  · recumbent bike: L1 5'  Supine:   · piriformis stretch bilaterally: 3x30"  · DKTC: 3x45 sec.   · push/pull (right anterior/left ilial posterior): 3x5"  · TA brace with isometric hip adduction/abduction with pilates ring: 2x10  · TA brace with knee fall out: 2x10, OTB  · Bridges with OTB: 2 x 10, pelvic tilt first - patient reports these feel good on her back.   Sidelying:   · s/l clams with 1#: 2x10  · Reverse clams 2 x 10  · S/l hip abduction 2 x 10  Sitting:    Standing:  · Anti-rotational walk out to Pal[employer] press with OTB 2x10 bilaterally    Manual therapy: Zack received the following manual therapy techniques: Joint mobilizations and Soft tissue Mobilization were applied to: piriformis and " lumbar spine for 15 minutes.    Manual techniques include:  Soft tissue mobilization:  · prone:    · lamina release: - NP  · STM: sciatic nerve bed, lumbar para, QL, superior to iliac crest  · Piriformis release on (L) and strain/counterstrain  Joint mobilization:    · p/a Lspine  · Long axis distraction (B)LE NP    Written Home Exercises Provided:Patient educated to continue with previously issued HEP to tolerance.  Pt demo good understanding of the education provided. Zack demonstrated good and fair return demonstration of activities.     Pt. education:  · Posture reeducation, body mechanics, HEP, activity modification/avoidance  · No spiritual or educational barriers to learning provided  · Pt has no cultural, educational or language barriers to learning      Assessment      Patient had good tolerance to manual therapy and exercises denying increased pain. PT advised pt. to reduce the heel lift down one more layer to not overcorrect her LLD. Pt. agreed with plan and presented with less pelvic obliquity as a result.. Will continue to progress core hip lumbopelvic stabilization as joelle.    Pt will continue to benefit from skilled outpatient physical therapy to address the remaining functional deficits, provide pt/family education, and to maximize pt's level of independence in the home and community environment. .     Anticipated barriers to physical therapy: chronicity of condition    · Pt's spiritual, cultural and educational needs considered and pt agreeable to plan of care and goals as stated below:   Please see IE for goals; no new/revised goals     Plan   Continue with established Plan of Care towards PT goals with focus on core /LE strength , flexibility ex's. .

## 2019-02-06 ENCOUNTER — CLINICAL SUPPORT (OUTPATIENT)
Dept: REHABILITATION | Facility: HOSPITAL | Age: 79
End: 2019-02-06
Attending: PAIN MEDICINE
Payer: MEDICARE

## 2019-02-06 DIAGNOSIS — R53.1 WEAKNESS: ICD-10-CM

## 2019-02-06 PROCEDURE — 97110 THERAPEUTIC EXERCISES: CPT | Mod: HCNC,PO

## 2019-02-06 NOTE — PROGRESS NOTES
"                                                    Physical Therapy Progress Note     Name: Zack Pavon [facility] Number: [id number]  Diagnosis:   Encounter Diagnosis   Name Primary?   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 Pt. notes she is still having difficulty with sleep due to pain.     Pain Scale: before treatment: 3 currently; after treatment: 0/10    Objective     Patient received individual therapy to increase strength, endurance, ROM, flexibility, posture and core stabilization with 1 patients with activities as follows:     Therapeutic exercise: Zack received therapeutic exercises to develop strength, endurance, ROM, flexibility and posture for 40 minutes including:     warm-up  · recumbent bike: L1 5'  Supine:   · piriformis stretch bilaterally: 3x30"  · DKTC: 3x45 sec.   · push/pull (right anterior/left ilial posterior): 3x5"  · TA brace with isometric hip adduction/abduction with pilates ring: 3x10  · TA brace with knee fall out: 3x10, OTB  · Bridges with OTB: 3 x 10, pelvic tilt first - patient reports these feel good on her back.   Sidelying:   · s/l clams with 1#: 3x10  · Reverse clams 3 x 10  · S/l hip abduction: 3x8`  Sitting:    Standing:  · Anti-rotational walk out to Paloff press with OTB 2x10 bilaterally    Manual therapy: Zack received the following manual therapy techniques: Joint mobilizations and Soft tissue " Mobilization were applied to: piriformis and lumbar spine for 15 minutes.    Manual techniques include:  Soft tissue mobilization:  · prone:    · lamina release: - NP  · STM: sciatic nerve bed, lumbar para, QL, superior to iliac crest  · Piriformis release on (L) and strain/counterstrain  Joint mobilization:    · p/a Lspine  · Long axis distraction (B)LE NP    Written Home Exercises Provided:Patient educated to continue with previously issued HEP to tolerance.  Pt demo good understanding of the education provided. Zack demonstrated good and fair return demonstration of activities.     Pt. education:  · Posture reeducation, body mechanics, HEP, activity modification/avoidance  · No spiritual or educational barriers to learning provided  · Pt has no cultural, educational or language barriers to learning      Assessment      Patient had good tolerance to manual therapy and exercise progression denying increased pain. Pt. notes much less tenderness since heel lift reduction. Pt. is progressing well towards goals. Will continue to progress core hip lumbopelvic stabilization as joelle.    Pt will continue to benefit from skilled outpatient physical therapy to address the remaining functional deficits, provide pt/family education, and to maximize pt's level of independence in the home and community environment. .     Anticipated barriers to physical therapy: chronicity of condition    · Pt's spiritual, cultural and educational needs considered and pt agreeable to plan of care and goals as stated below:   Please see IE for goals; no new/revised goals     Plan   Continue with established Plan of Care towards PT goals with focus on core /LE strength , flexibility ex's. .

## 2019-02-11 ENCOUNTER — CLINICAL SUPPORT (OUTPATIENT)
Dept: REHABILITATION | Facility: HOSPITAL | Age: 79
End: 2019-02-11
Attending: PAIN MEDICINE
Payer: MEDICARE

## 2019-02-11 DIAGNOSIS — R53.1 WEAKNESS: ICD-10-CM

## 2019-02-11 PROCEDURE — 97110 THERAPEUTIC EXERCISES: CPT | Mod: PO

## 2019-02-11 PROCEDURE — 97140 MANUAL THERAPY 1/> REGIONS: CPT | Mod: PO

## 2019-02-11 NOTE — PROGRESS NOTES
"                                                    Physical Therapy Progress Note     Name: Zack Pavon [facility]  Clinic Number: [id number]  Diagnosis:   Encounter Diagnosis   Name Primary?   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    Pain Scale: before treatment: 3 currently (L) lower back ; after treatment: 0/10    Objective     Patient received individual therapy to increase strength, endurance, ROM, flexibility, posture and core stabilization with 1 patients with activities as follows:     Therapeutic exercise: Zack received therapeutic exercises to develop strength, endurance, ROM, flexibility and posture for 40 minutes including:     warm-up  · recumbent bike: L1 5' (pilow   Supine:   · piriformis stretch bilaterally: 3x30"  · DKTC: 3x30 sec.   · push/pull (right anterior/left ilial posterior): 3x5"  · TA brace with isometric hip adduction/abduction with pilates ring: 3x10  · TA brace with knee fall out: 3x10, OTB  · Bridges with OTB: 3 x 10, pelvic tilt first  Sidelying:   · s/l clams with 1#: 3x10  · Reverse clams 3 x 10  · S/l hip abduction: 3x8`--NP  Sitting:    Standing:  · Anti-rotational walk out to Paloff press with OTB 2x10 bilaterally    Manual therapy: Zack received the following manual therapy techniques: Joint mobilizations and Soft tissue Mobilization were applied to: piriformis and lumbar " spine for 15 minutes.    Manual techniques include:  Soft tissue mobilization:  · prone:    · lamina release: - NP  · STM: sciatic nerve bed, lumbar para, QL, superior to iliac crest  · Piriformis release on (L) and strain/counterstrain  Joint mobilization:    · p/a Lspine--NP   · Long axis distraction (L)LE     Written Home Exercises Provided:Patient educated to continue with previously issued HEP to tolerance.  Pt demo good understanding of the education provided. Zack demonstrated good and fair return demonstration of activities.     Pt. education:  · Posture reeducation, body mechanics, HEP, activity modification/avoidance  · No spiritual or educational barriers to learning provided  · Pt has no cultural, educational or language barriers to learning      Assessment      Patient joelle Tx fairly well. She was able to perform and progress slightly with the above ex's/activities within tolerable muscular fatigue but without increased pain. She reports compliance with shoe lift but still has some increased discomfort at night.  She reports good relief with manual techniques and pain level = 0/10 post session.    Pt will continue to benefit from skilled outpatient physical therapy to address the remaining functional deficits, provide pt/family education, and to maximize pt's level of independence in the home and community environment. .      Anticipated barriers to physical therapy: chronicity of condition    · Pt's spiritual, cultural and educational needs considered and pt agreeable to plan of care and goals as stated below:   Please see IE for goals; no new/revised goals     Plan   Continue with established Plan of Care towards PT goals with focus on core /LE strength , flexibility ex's. .

## 2019-02-13 ENCOUNTER — CLINICAL SUPPORT (OUTPATIENT)
Dept: REHABILITATION | Facility: HOSPITAL | Age: 79
End: 2019-02-13
Attending: PAIN MEDICINE
Payer: MEDICARE

## 2019-02-13 ENCOUNTER — OFFICE VISIT (OUTPATIENT)
Dept: OPHTHALMOLOGY | Facility: CLINIC | Age: 79
End: 2019-02-13
Payer: MEDICARE

## 2019-02-13 DIAGNOSIS — R53.1 WEAKNESS: ICD-10-CM

## 2019-02-13 DIAGNOSIS — H25.13 NUCLEAR SCLEROSIS, BILATERAL: Primary | ICD-10-CM

## 2019-02-13 PROCEDURE — 92014 COMPRE OPH EXAM EST PT 1/>: CPT | Mod: S$GLB,,, | Performed by: OPHTHALMOLOGY

## 2019-02-13 PROCEDURE — 99999 PR PBB SHADOW E&M-EST. PATIENT-LVL II: CPT | Mod: PBBFAC,,, | Performed by: OPHTHALMOLOGY

## 2019-02-13 PROCEDURE — 99999 PR PBB SHADOW E&M-EST. PATIENT-LVL II: ICD-10-PCS | Mod: PBBFAC,,, | Performed by: OPHTHALMOLOGY

## 2019-02-13 PROCEDURE — 97110 THERAPEUTIC EXERCISES: CPT | Mod: PO

## 2019-02-13 PROCEDURE — 97140 MANUAL THERAPY 1/> REGIONS: CPT | Mod: PO

## 2019-02-13 PROCEDURE — 92014 PR EYE EXAM, EST PATIENT,COMPREHESV: ICD-10-PCS | Mod: S$GLB,,, | Performed by: OPHTHALMOLOGY

## 2019-02-13 NOTE — PROGRESS NOTES
"                                                    Physical Therapy Progress Note     Name: Zack Pavon [facility]  Clinic Number: [id number]  Diagnosis:   Encounter Diagnosis   Name Primary?   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 States that she has been able to sleep better the last few nights without increased symptoms.   Pain Scale: before treatment: 2  currently (L) lower back ; after treatment: 0/10    Objective     Patient received individual therapy to increase strength, endurance, ROM, flexibility, posture and core stabilization with 1 patients with activities as follows:     Therapeutic exercise: Zack received therapeutic exercises to develop strength, endurance, ROM, flexibility and posture for 40 minutes including:     warm-up  · recumbent bike: L1 5' (pilow behind back for comfort)   Supine:   · piriformis stretch bilaterally: 2x30"  · DKTC: 3x30 sec.   · push/pull (right anterior/left ilial posterior): 3x5"  · TA brace with isometric hip adduction/abduction with pilates ring: 3x10  · TA brace with knee fall out: 3x10, GTB  · Bridges with GTB : 3 x 10, pelvic tilt first  · Knee to chest with T-ball + Tband pull down 2 x 8  Sidelying:   · s/l clams with 1#: 3x10  · Reverse clams 3 x 10  · S/l hip abduction: 3x8`--NP  Sitting:    Standing:  · Anti-rotational walk-out to Paloff press with OTB 2x10 " "bilaterally  · Lateral step ups (4") 2 x 8  · Hip abd 2 x 8    Manual therapy: Zack received the following manual therapy techniques: Joint mobilizations and Soft tissue Mobilization were applied to: piriformis and lumbar spine for 15 minutes.    Manual techniques include:  Soft tissue mobilization:  · prone:    · lamina release: - NP  · STM: sciatic nerve bed, lumbar para, QL, superior to iliac crest  · Piriformis release on (L) and strain/counterstrain  Joint mobilization:    · p/a Lspine--NP   · Long axis distraction (L)LE      Written Home Exercises Provided:Patient educated to continue with previously issued HEP to tolerance.  Pt demo good understanding of the education provided. Zack demonstrated good and fair return demonstration of activities.     Pt. education:  · Posture reeducation, body mechanics, HEP, activity modification/avoidance  · No spiritual or educational barriers to learning provided  · Pt has no cultural, educational or language barriers to learning      Assessment      Patient joelle Tx fairly well.  Improved overall pain control this week. Some evidence of trendelenberg gait on (L). She was progressed to perform additional closed chain ex's of lateral step ups and Hip abduction which she was able to perform without c/o.  She reports good relief with manual techniques and pain level = 0/10 post session.  Pt will continue to benefit from skilled outpatient physical therapy to address the remaining functional deficits, provide pt/family education, and to maximize pt's level of independence in the home and community environment. .      Anticipated barriers to physical therapy: chronicity of condition    · Pt's spiritual, cultural and educational needs considered and pt agreeable to plan of care and goals as stated below:   Please see IE for goals; no new/revised goals     Plan   Continue with established Plan of Care towards PT goals with focus on core /LE strength , flexibility ex's. .  "

## 2019-02-13 NOTE — PROGRESS NOTES
HPI     Patient states she is here because last week she had an episode of the   right eyelid drooping upon wakening. She says the drooping would improve   when she would physically take her fingers and open her eyes. Her right   eyelid had also been red which cleared up the following day. Denies pain   0/10. Patient states it feels like her vision is starting to change. More   in the right eye than the left eye.     Last edited by Carolina Kline on 2/13/2019  2:42 PM. (History)            Assessment /Plan     For exam results, see Encounter Report.    Nuclear sclerosis, bilateral      Incipient cataract: Patient does reports mild visual decline, but not sufficient to affect activities of daily living. I recommend monitoring visual status and follow up when visual symptoms worsen.

## 2019-02-21 ENCOUNTER — CLINICAL SUPPORT (OUTPATIENT)
Dept: REHABILITATION | Facility: HOSPITAL | Age: 79
End: 2019-02-21
Attending: PAIN MEDICINE
Payer: MEDICARE

## 2019-02-21 DIAGNOSIS — R53.1 WEAKNESS: ICD-10-CM

## 2019-02-21 PROCEDURE — 97140 MANUAL THERAPY 1/> REGIONS: CPT | Mod: PO

## 2019-02-21 PROCEDURE — 97110 THERAPEUTIC EXERCISES: CPT | Mod: PO

## 2019-02-21 NOTE — PROGRESS NOTES
"                                                    Physical Therapy Progress Note     Name: Zack Pavon [facility]  Clinic Number: [id number]  Diagnosis:   Encounter Diagnosis   Name Primary?   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 She reports some increased physical stress and fatigue over the past week as she has been helping out with her  who had a TKA surgery.  Pain Scale: before treatment: 3  currently (L) lower back ; after treatment: 0/10    Objective     Therapeutic exercise: Zack received therapeutic exercises to develop strength, endurance, ROM, flexibility and posture for 40 minutes including:     warm-up  · recumbent bike: L1 5' (pilow behind back for comfort)   Supine:   · piriformis stretch bilaterally: 2x30"  · DKTC: 3x30 sec.   · push/pull (right anterior/left ilial posterior): 3x5"  · TA brace with isometric hip adduction/abduction with pilates ring: 3x10  · TA brace with knee fall out: 3x10, GTB  · Bridges with GTB : 3 x 10, pelvic tilt first  · Knee to chest with T-ball + Tband pull down 2 x 10  Sidelying:   · s/l clams with 1#: 3x10  · Reverse clams 3 x 10  · S/l hip abduction: 3x8`--NP  Sitting:    Standing:  · Anti-rotational walk-out to Paloff press with OTB 2x10 bilaterally  · Lateral step ups (4") 2 x 10  · Hip abd ( while standing on 2" box) 2 x10    Manual therapy: Zack received the following manual therapy techniques: Joint " mobilizations and Soft tissue Mobilization were applied to: piriformis and lumbar spine for 15 minutes.    Manual techniques include:  Soft tissue mobilization:  · prone:    · lamina release: - NP  · STM: sciatic nerve bed, lumbar para, QL, superior to iliac crest  · Piriformis release on (L) and strain/counterstrain  Joint mobilization:    · p/a Lspine--NP   · Long axis distraction (L)LE      Written Home Exercises Provided:Patient educated to continue with previously issued HEP to tolerance.  Pt demo good understanding of the education provided. Zack demonstrated good and fair return demonstration of activities.     Pt. education:  · Posture reeducation, body mechanics, HEP, activity modification/avoidance  · No spiritual or educational barriers to learning provided  · Pt has no cultural, educational or language barriers to learning      Assessment      Patient joelle Tx fairly well. She did present with some increased fatigue and tolerable (L) lower back/piriformis discomfort due to increased physical demands of caring for her  this week. Still has some evidence of trendelenberg gait on (L) although it did appear slightly improved from previous visit.. She was progressed to perform additional reps closed chain ex's of lateral step ups and Hip abduction which she was able to perform within tolerable level of fatigue but without increased pain .c/o.  She reports good relief with manual techniques and pain level = 0/10 post session.  Pt will continue to benefit from skilled outpatient physical therapy to address the remaining functional deficits, provide pt/family education, and to maximize pt's level of independence in the home and community environment. .      Anticipated barriers to physical therapy: chronicity of condition    · Pt's spiritual, cultural and educational needs considered and pt agreeable to plan of care and goals as stated below:   Please see IE for goals; no new/revised goals     Plan    Continue with established Plan of Care towards PT goals with focus on core /LE strength , flexibility ex's. .

## 2019-02-25 ENCOUNTER — CLINICAL SUPPORT (OUTPATIENT)
Dept: REHABILITATION | Facility: HOSPITAL | Age: 79
End: 2019-02-25
Attending: PAIN MEDICINE
Payer: MEDICARE

## 2019-02-25 DIAGNOSIS — R53.1 WEAKNESS: ICD-10-CM

## 2019-02-25 PROCEDURE — 97110 THERAPEUTIC EXERCISES: CPT | Mod: PO

## 2019-02-25 NOTE — PROGRESS NOTES
Physical Therapy Reassessment     Name: Zack Hopper  [facility] Number: [id number]  Diagnosis:   Encounter Diagnosis   Name Primary?   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 Transitioning from supine lying, sit to stand, car transfers needs to take her time before she   starts moving. This morning she used the lacrosse ball on the left buttock/piriformis muscle.  Pain Scale: before treatment: 3  currently (L) lower back ; after treatment: 0/10    Objective   Lumbar ROM: (measured in degrees)     Degrees Quality   Flexion  WFL        Extension 5     Left Side Bending 25     Right Side Bending 25     Left rotation 50%     Right Rotation 75%        Active/Passive Hip ROM: (measured in degrees)     RLE LLE   ER /20 /20   IR /15 /15      Dermatomes: (impaired/normal)      RLE LLE   L2 Intact Intact   L3 Intact Intact   L4 Intact Intact   L5 Intact Intact   S1 Intact Intact      Reflexes: L3: 2 bilaterally; unable to obtain bilateral S1 reflex     Lower Extremity Strength (graded 0-5 out of 5)    RLE LLE   Hip flexion: 4+/5 4/5   Hip ER 4+/5 4+/5   Hip IR 4/5 3+/5 * left SIJ   Knee extension: 4+/5 4+/5   Ankle dorsiflexion: 5/5 5/5   Great toe extension: 4+/5 3-/5   Posterior fibers of Gluteus medius 3+/5 3/5   Knee flexion 5/5 5/5   Glute max 4+/5 4/5  "  Ankle plantarflexion 5/5 5/5   Hip extension: 4/5 4-/5      Special Tests: ((+): pos.; (-): neg.)   · Slump Test: - bilaterally  · SLR Test: 90 bilaterally  · Bridge Test: +  · Pirformis Test: -  · NAKITA left: + SIJ pain  · Scour bilateral hips: -     Flexibility:   · Popliteal Angle: 90 deg. bilateral      Palpation for condition:   · Position: right ilial anterior and left ilial posterior    · Warmth:   · Swelling:   · Texture: hypertonic bilateral: GM, GMin, and piriformis (left greater than right)     Joint Mobility: (graded 0-6 out of 6) L5/S1: 2/6    Therapeutic exercise: Zack received therapeutic exercises to develop strength, endurance, ROM, flexibility and posture for 40 minutes including:     warm-up  · recumbent bike: L1 5' (pilow behind back for comfort)   Supine:   · figure four piriformis stretch bilaterally: 2x30"  · DKTC: 3x30 sec.   · push/pull (right anterior/left ilial posterior): 3x5"  · TA brace with isometric hip adduction/abduction with pilates ring: 3x10  · TA brace with knee fall out: 3x10, GTB  · Bridges with GTB : 3 x 10, pelvic tilt first  · Knee to chest with T-ball + Tband pull down 2 x 10  Sidelying:   · s/l clams with 1#: 3x10  · Reverse clams add in small ball next visit: 3 x 10  · S/l hip abduction: 3x8`--NP  Sitting:    Standing:  · Anti-rotational walk-out to Paloff press with OTB 2x10 bilaterally  · Lateral step ups (4") 2 x 10  · Hip abd ( while standing on 2" box) 2 x10    Manual therapy: Zack received the following manual therapy techniques: Joint mobilizations and Soft tissue Mobilization were applied to: piriformis and lumbar spine for 15 minutes.    Manual techniques include:  Soft tissue mobilization:  · prone:    · MFR left piriformis  · CFM left piriformis insertion  · c/r left piriformis  Joint mobilization:    · lateral hip distraction left  · Long axis distraction (L)LE with IR and ER  · left hip posterolateral glides Gr III    Written Home Exercises " "Provided:Patient educated to continue with previously issued HEP to tolerance.  Pt demo good understanding of the education provided. Zack demonstrated good and fair return demonstration of activities.     Pt. education:  · Posture reeducation, body mechanics, HEP, activity modification/avoidance  · No spiritual or educational barriers to learning provided  · Pt has no cultural, educational or language barriers to learning      Assessment      Patient has made some improvements in LE strength, but minimal change in bilateral hip joint mobility. Pt. had a lot of relief after left hip distraction and STM to left piriformis. Pt. reported "that was the most relief I think I've gotten so far." Her limited hip joint mobility appears to be creating undue stress on her SIJ. Will continue to focus on increased hip joint mobility, core hip lumbopelvic stabilization, and gait mechanics. Pt will continue to benefit from skilled outpatient physical therapy to address the remaining functional deficits, provide pt/family education, and to maximize pt's level of independence in the home and community environment. .      Anticipated barriers to physical therapy: chronicity of condition    · Pt's spiritual, cultural and educational needs considered and pt agreeable to plan of care and goals as stated below:   GOALS:   Short Term Goals:  4 weeks  1. Report decreased lumbosacral pain </= 5/10 at worst to increase tolerance for prolonged sitting/standing  2. Pt. to demonstrate proper cervical and scapula retraction requiring min. to no verbal cues from PT  3. Pt. to demonstrate increased MMT for core/lumbar paraspinals to 3/5 to increase endurance with prolonged sitting/standing.  4. Pt. to demonstrate increased MMT for left gluteus medius to 3+/5  to increase stability during community ambulation  5. Pt. to demonstrate increased MMT for left hip flexor to 4/5 to increase ability to clear toes during gait/stair negotiation. MET  6. Pt " to tolerate HEP to improve ROM and independence with ADL's MET  7. Pt. to be independent with symptom management     Long Term Goals: 8 weeks  1. Report decreased lumbosacral pain </=  2/10 at worst to increase tolerance for prolonged sitting/standing  2. Pt. to demonstrate proper cervical and scapula retraction requiring no verbal cues from PT  3. Increase thoracic joint mobility to 3-/6 to promote greater ease with self care skills  4. Increase lumbar joint mobility to 3-/6 to promote greater ease with prolonged sitting/standing  5. Pt. to demonstrate increased MMT for core/lumbar paraspinals to 3+/5 to increase endurance with prolonged sitting.   6. Pt. to demonstrate increased MMT for bilateral gluteus medius to 4+/5  to increase stability during ambulation on uneven surfaces.  7. Pt. to demonstrate increased MMT for bilateral hip flexor to 4+/5 to increase tolerance for ADL and work activities.   8. Pt to be independent with HEP to improve ROM and independence with ADL's  9. Pt. to be able to resume leisure work out with manageable symptoms     Plan   Continue with established Plan of Care towards PT goals with focus on core /LE strength , flexibility ex's. .

## 2019-02-27 ENCOUNTER — TELEPHONE (OUTPATIENT)
Dept: INTERNAL MEDICINE | Facility: CLINIC | Age: 79
End: 2019-02-27

## 2019-02-27 ENCOUNTER — CLINICAL SUPPORT (OUTPATIENT)
Dept: REHABILITATION | Facility: HOSPITAL | Age: 79
End: 2019-02-27
Attending: PAIN MEDICINE
Payer: MEDICARE

## 2019-02-27 DIAGNOSIS — R53.1 WEAKNESS: Primary | ICD-10-CM

## 2019-02-27 PROCEDURE — 97140 MANUAL THERAPY 1/> REGIONS: CPT | Mod: PO

## 2019-02-27 PROCEDURE — 97110 THERAPEUTIC EXERCISES: CPT | Mod: PO

## 2019-02-27 NOTE — TELEPHONE ENCOUNTER
----- Message from Olga Lidia Merida sent at 2/27/2019  2:17 PM CST -----  Contact: pt [phone]  Pt is stating that a PA is need for her prescription zolpidem (AMBIEN) 5 MG Tab.  Please advise

## 2019-02-27 NOTE — PROGRESS NOTES
"                                                    Physical Therapy daily note     Name: Zack Hopper  Clinic Number: [id number]  Diagnosis:   Encounter Diagnosis   Name Primary?   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     Pain Scale: before treatment: 3  currently (L) lower back/glute ; after treatment: 0/10    Objective          Therapeutic exercise: Zack received therapeutic exercises to develop strength, endurance, ROM, flexibility and posture for 40 minutes including:     warm-up  · recumbent bike: L1 5' (pilow behind back for comfort) --NP  Supine:   · figure four piriformis stretch bilaterally: 2x30"  · DKTC: 2x30 sec.   · push/pull (right anterior/left ilial posterior): 3x5"  · TA brace with isometric hip adduction/abduction with pilates ring: 3x10--NP  · TA brace with knee fall out: 3x10, GTB--NP  · Bridges with BTB  : 3 x 10, pelvic tilt first  · Knee to chest with T-ball + red sports cord pull down  2 x 10  Sidelying:   · s/l clams with 2#: 3x10  · Reverse clams  With small ball between legs 3 x 10  · S/l hip abduction: 3x8`--NP  Sitting:    Standing:  · Anti-rotational walk-out to Paloff press with OTB 2x10 bilaterally  · Lateral step ups (4") + hip abd 2 x 10  ·  Shuttle kick backs with 1 red band x 10 each    Manual therapy: Zack received the following manual therapy " techniques: Joint mobilizations and Soft tissue Mobilization were applied to: piriformis and lumbar spine for 15 minutes.    Manual techniques include:  Soft tissue mobilization:  · prone:    · MFR left piriformis  · CFM left piriformis insertion  · c/r left piriformis  · Therapist assisted foam roller massage to (L) piriformis   Joint mobilization:    · lateral hip distraction left  · Long axis distraction (L)LE with IR and ER  · left hip posterolateral glides Gr III    Written Home Exercises Provided:Patient educated to continue with previously issued HEP to tolerance.  Pt demo good understanding of the education provided. Zack demonstrated good and fair return demonstration of activities.     Pt. education:  · Posture reeducation, body mechanics, HEP, activity modification/avoidance  · No spiritual or educational barriers to learning provided  · Pt has no cultural, educational or language barriers to learning      PT/PTA met face to face to discuss patient's treatment plan and progress towards established goals.  Treatment will be continued as described in initial report/eval and progress notes.    Assessment      Patient joelle Tx well. She was able to perform the above ex's/activitites without increased symptoms. She was progressed to perform shuttle kick backs today with muscular fatigue only and without increased pain. She continues to report good relief with manual techniques lasting through the day but still having some symptoms at night.   Will continue to focus on increased hip joint mobility, core hip lumbopelvic stabilization, and gait mechanics. Pt will continue to benefit from skilled outpatient physical therapy to address the remaining functional deficits, provide pt/family education, and to maximize pt's level of independence in the home and community environment. .      Anticipated barriers to physical therapy: chronicity of condition    · Pt's spiritual, cultural and educational needs considered  and pt agreeable to plan of care and goals as stated below:   GOALS:   Short Term Goals:  4 weeks  1. Report decreased lumbosacral pain </= 5/10 at worst to increase tolerance for prolonged sitting/standing  2. Pt. to demonstrate proper cervical and scapula retraction requiring min. to no verbal cues from PT  3. Pt. to demonstrate increased MMT for core/lumbar paraspinals to 3/5 to increase endurance with prolonged sitting/standing.  4. Pt. to demonstrate increased MMT for left gluteus medius to 3+/5  to increase stability during community ambulation  5. Pt. to demonstrate increased MMT for left hip flexor to 4/5 to increase ability to clear toes during gait/stair negotiation. MET  6. Pt to tolerate HEP to improve ROM and independence with ADL's MET  7. Pt. to be independent with symptom management     Long Term Goals: 8 weeks  1. Report decreased lumbosacral pain </=  2/10 at worst to increase tolerance for prolonged sitting/standing  2. Pt. to demonstrate proper cervical and scapula retraction requiring no verbal cues from PT  3. Increase thoracic joint mobility to 3-/6 to promote greater ease with self care skills  4. Increase lumbar joint mobility to 3-/6 to promote greater ease with prolonged sitting/standing  5. Pt. to demonstrate increased MMT for core/lumbar paraspinals to 3+/5 to increase endurance with prolonged sitting.   6. Pt. to demonstrate increased MMT for bilateral gluteus medius to 4+/5  to increase stability during ambulation on uneven surfaces.  7. Pt. to demonstrate increased MMT for bilateral hip flexor to 4+/5 to increase tolerance for ADL and work activities.   8. Pt to be independent with HEP to improve ROM and independence with ADL's  9. Pt. to be able to resume leisure work out with manageable symptoms     Plan   Continue with established Plan of Care towards PT goals with focus on core /LE strength , flexibility ex's. .

## 2019-02-28 ENCOUNTER — TELEPHONE (OUTPATIENT)
Dept: INTERNAL MEDICINE | Facility: CLINIC | Age: 79
End: 2019-02-28

## 2019-02-28 NOTE — TELEPHONE ENCOUNTER
----- Message from Zeb Hobbs sent at 2/28/2019  3:16 PM CST -----  Contact: Self [phone]  Pt is calling to follow up on PA forms for medication.

## 2019-02-28 NOTE — TELEPHONE ENCOUNTER
Spoke to pt. Pt advised that a PA has been initiated for her zolpidem. Pt advised that the zolpidem is considered high risk for females over 65.  Advised we will let her know outcome.

## 2019-03-03 ENCOUNTER — TELEPHONE (OUTPATIENT)
Dept: INTERNAL MEDICINE | Facility: CLINIC | Age: 79
End: 2019-03-03

## 2019-03-04 NOTE — TELEPHONE ENCOUNTER
Prior auth for zolpidem 5mg paperwork   Please ask pt what other meds she has tried and why they didn't work  Need to send med list

## 2019-03-06 ENCOUNTER — CLINICAL SUPPORT (OUTPATIENT)
Dept: REHABILITATION | Facility: HOSPITAL | Age: 79
End: 2019-03-06
Attending: PAIN MEDICINE
Payer: MEDICARE

## 2019-03-06 DIAGNOSIS — R53.1 WEAKNESS: ICD-10-CM

## 2019-03-06 PROCEDURE — 97110 THERAPEUTIC EXERCISES: CPT | Mod: PO

## 2019-03-06 NOTE — PROGRESS NOTES
"                                                    Physical Therapy daily note     Name: Zack Pavon [facility]  Clinic Number: [id number]  Diagnosis:   Encounter Diagnosis   Name Primary?   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 had to leave early)  1:1 time:50    Subjective     Pt reports that she is still having a little trouble getting comfortable at night time. She seems to think it is her mattress. She notes that she is supposed to get it changed out this coming week.   Pain Scale: before treatment: 3  currently (L) lower back/glute ; after treatment: 0/10    Objective          Therapeutic exercise: Zack received therapeutic exercises to develop strength, endurance, ROM, flexibility and posture for 40 minutes including:     warm-up  · recumbent bike: L1 5' (pilow behind back for comfort) --NP  Supine:   · figure four piriformis stretch bilaterally: 2x30"  · DKTC: 2x30 sec.   · push/pull (right anterior/left ilial posterior): 3x5"  · Bridges with BTB: 3 x 10, pelvic tilt first  · Knee to chest with T-ball + red sports cord pull down  2 x 10  Sidelying:   · s/l clams with 2#: 3x10  · Reverse clams (With small ball between legs): 3 x 10  Sitting:    Standing:  · Anti-rotational walk-out to Paloff press with OTB 2x10 bilaterally  · Lateral step ups (4") + hip abd 2 x 10  · lateral walking with BTB at knees along length of mirror (1 laps with knees straight and another lap with mini-squat)  · Shuttle kick backs with 1 red band x 10 each    Manual " therapy: Zack received the following manual therapy techniques: Joint mobilizations and Soft tissue Mobilization were applied to: piriformis and lumbar spine for 15 minutes.    Manual techniques include:  Soft tissue mobilization:  · prone:    · MFR left piriformis  · CFM left piriformis insertion  · c/r left piriformis  · Therapist assisted foam roller massage to (L) piriformis   Joint mobilization:    · lateral hip distraction left  · Long axis distraction (L)LE with IR and ER  · left hip posterolateral glides Gr III    Written Home Exercises Provided:Patient educated to continue with previously issued HEP to tolerance.  Pt demo good understanding of the education provided. Zack demonstrated good and fair return demonstration of activities.     Pt. education:  · Posture reeducation, body mechanics, HEP, activity modification/avoidance  · No spiritual or educational barriers to learning provided  · Pt has no cultural, educational or language barriers to learning      PT/PTA met face to face to discuss patient's treatment plan and progress towards established goals.  Treatment will be continued as described in initial report/eval and progress notes.    Assessment      Patient had good tolerance to exercise progression and manual therapy denying increased pain. Pt. was reinforced to engage greater musculature during gait/transitional activities. Pt. appeared to have greater awareness. Will continue to focus on increased hip joint mobility, core hip lumbopelvic stabilization, and gait mechanics. Pt will continue to benefit from skilled outpatient physical therapy to address the remaining functional deficits, provide pt/family education, and to maximize pt's level of independence in the home and community environment. .      Anticipated barriers to physical therapy: chronicity of condition    · Pt's spiritual, cultural and educational needs considered and pt agreeable to plan of care and goals as stated below:    GOALS:   Short Term Goals:  4 weeks  1. Report decreased lumbosacral pain </= 5/10 at worst to increase tolerance for prolonged sitting/standing  2. Pt. to demonstrate proper cervical and scapula retraction requiring min. to no verbal cues from PT  3. Pt. to demonstrate increased MMT for core/lumbar paraspinals to 3/5 to increase endurance with prolonged sitting/standing.  4. Pt. to demonstrate increased MMT for left gluteus medius to 3+/5  to increase stability during community ambulation  5. Pt. to demonstrate increased MMT for left hip flexor to 4/5 to increase ability to clear toes during gait/stair negotiation. MET  6. Pt to tolerate HEP to improve ROM and independence with ADL's MET  7. Pt. to be independent with symptom management     Long Term Goals: 8 weeks  1. Report decreased lumbosacral pain </=  2/10 at worst to increase tolerance for prolonged sitting/standing  2. Pt. to demonstrate proper cervical and scapula retraction requiring no verbal cues from PT  3. Increase thoracic joint mobility to 3-/6 to promote greater ease with self care skills  4. Increase lumbar joint mobility to 3-/6 to promote greater ease with prolonged sitting/standing  5. Pt. to demonstrate increased MMT for core/lumbar paraspinals to 3+/5 to increase endurance with prolonged sitting.   6. Pt. to demonstrate increased MMT for bilateral gluteus medius to 4+/5  to increase stability during ambulation on uneven surfaces.  7. Pt. to demonstrate increased MMT for bilateral hip flexor to 4+/5 to increase tolerance for ADL and work activities.   8. Pt to be independent with HEP to improve ROM and independence with ADL's  9. Pt. to be able to resume leisure work out with manageable symptoms     Plan   Continue with established Plan of Care towards PT goals with focus on core /LE strength , flexibility ex's. .

## 2019-03-11 ENCOUNTER — CLINICAL SUPPORT (OUTPATIENT)
Dept: REHABILITATION | Facility: HOSPITAL | Age: 79
End: 2019-03-11
Attending: PAIN MEDICINE
Payer: MEDICARE

## 2019-03-11 DIAGNOSIS — R53.1 WEAKNESS: ICD-10-CM

## 2019-03-11 PROCEDURE — 97110 THERAPEUTIC EXERCISES: CPT | Mod: PO

## 2019-03-11 NOTE — PROGRESS NOTES
"                                                    Physical Therapy daily note     Name: Zack Hdzplet  Clinic Number: [id number]  Diagnosis:   Encounter Diagnosis   Name Primary?   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 had to leave early)  1:1 time:50    Subjective     Pt reports she seems to be having pain all the time. Denies poor reaction to last tx session. Notes that she doesn't stretch at home as often as she should.   Pain Scale: before treatment: 3  currently (L) lower back/glute ; after treatment: 0/10    Objective          Therapeutic exercise: Zack received therapeutic exercises to develop strength, endurance, ROM, flexibility and posture for 40 minutes including:     warm-up  · recumbent bike: L1 5' (pilow behind back for comfort) --NP  Supine:   · figure four piriformis stretch bilaterally: 2x30"  · DKTC: 2x30 sec.   · push/pull (right anterior/left ilial posterior): 3x5"  · Bridges with BTB: 3 x 12 pelvic tilt first  · Knee to chest with T-ball + red sports cord pull down:  2 x 10  Sidelying:   · s/l clams with 2#: 3x10  · Reverse clams (With small ball between legs): 3 x 10  Sitting:    Standing:  · TPR with lacrosse ball left piriformis: 3x30 sec.   · Anti-rotational walk-out to Paloff press with OTB 2x10 bilaterally  · Lateral step ups (4") + hip abd 2 x 10  · lateral walking with BTB at knees along length of mirror (1 laps with knees straight and another lap with mini-squat)  · Shuttle kick backs with 1 red band x 10 " each  Machines  · sidelying single leg squat 1 black band: 2x8 bilaterally    Manual therapy: Zack received the following manual therapy techniques: Joint mobilizations and Soft tissue Mobilization were applied to: piriformis and lumbar spine for 15 minutes.    Manual techniques include:  Soft tissue mobilization:  · prone:    · MFR left piriformis  · CFM left piriformis insertion  · c/r left piriformis  Joint mobilization:    · lumbar p/a Gr III  · lateral hip distraction left  · Long axis distraction (L)LE with IR and ER  · left hip posterolateral glides Gr III    Written Home Exercises Provided:Patient educated to continue with previously issued HEP to tolerance.  Pt demo good understanding of the education provided. Zack demonstrated good and fair return demonstration of activities.     Pt. education:  · Posture reeducation, body mechanics, HEP, activity modification/avoidance  · No spiritual or educational barriers to learning provided  · Pt has no cultural, educational or language barriers to learning      PT/PTA met face to face to discuss patient's treatment plan and progress towards established goals.  Treatment will be continued as described in initial report/eval and progress notes.    Assessment      Pt. had good tolerance to manual therapy and exercise progression denying increased pain. Pt. had less antalgia after tx session. Pt. is slowy progressing towards goals.    Will continue to focus on increased hip joint mobility, core hip lumbopelvic stabilization, and gait mechanics. Pt will continue to benefit from skilled outpatient physical therapy to address the remaining functional deficits, provide pt/family education, and to maximize pt's level of independence in the home and community environment. .      Anticipated barriers to physical therapy: chronicity of condition    · Pt's spiritual, cultural and educational needs considered and pt agreeable to plan of care and goals as stated below:    GOALS:   Short Term Goals:  4 weeks  1. Report decreased lumbosacral pain </= 5/10 at worst to increase tolerance for prolonged sitting/standing  2. Pt. to demonstrate proper cervical and scapula retraction requiring min. to no verbal cues from PT  3. Pt. to demonstrate increased MMT for core/lumbar paraspinals to 3/5 to increase endurance with prolonged sitting/standing.  4. Pt. to demonstrate increased MMT for left gluteus medius to 3+/5  to increase stability during community ambulation  5. Pt. to demonstrate increased MMT for left hip flexor to 4/5 to increase ability to clear toes during gait/stair negotiation. MET  6. Pt to tolerate HEP to improve ROM and independence with ADL's MET  7. Pt. to be independent with symptom management     Long Term Goals: 8 weeks  1. Report decreased lumbosacral pain </=  2/10 at worst to increase tolerance for prolonged sitting/standing  2. Pt. to demonstrate proper cervical and scapula retraction requiring no verbal cues from PT  3. Increase thoracic joint mobility to 3-/6 to promote greater ease with self care skills  4. Increase lumbar joint mobility to 3-/6 to promote greater ease with prolonged sitting/standing  5. Pt. to demonstrate increased MMT for core/lumbar paraspinals to 3+/5 to increase endurance with prolonged sitting.   6. Pt. to demonstrate increased MMT for bilateral gluteus medius to 4+/5  to increase stability during ambulation on uneven surfaces.  7. Pt. to demonstrate increased MMT for bilateral hip flexor to 4+/5 to increase tolerance for ADL and work activities.   8. Pt to be independent with HEP to improve ROM and independence with ADL's  9. Pt. to be able to resume leisure work out with manageable symptoms     Plan   Continue with established Plan of Care towards PT goals with focus on core /LE strength , flexibility ex's. .

## 2019-03-12 ENCOUNTER — TELEPHONE (OUTPATIENT)
Dept: INTERNAL MEDICINE | Facility: CLINIC | Age: 79
End: 2019-03-12

## 2019-03-12 NOTE — TELEPHONE ENCOUNTER
If she hasn't tried other sleep meds     Insurance rec trial of trazadone or belsomnra    Is she willing to try one of then

## 2019-03-12 NOTE — TELEPHONE ENCOUNTER
----- Message from Elaina High sent at 3/12/2019  3:14 PM CDT -----  Contact: Steph/ [facility] [phone]  [facility] Prior Auth /  Reference # [id number]  Zolpidem tartrate. They need answers to the fololowing clinical questions.    1. Please provide patient specific rationale regarding why each of the non high risk formulary medications have not been tried or would not work. Has patient tried these without success or would you be willing to prescribe thiese?  Trazadone, belsomra,     2. Would you like to withdraw this appeal and try one of the alternatives?

## 2019-03-13 ENCOUNTER — CLINICAL SUPPORT (OUTPATIENT)
Dept: REHABILITATION | Facility: HOSPITAL | Age: 79
End: 2019-03-13
Attending: PAIN MEDICINE
Payer: MEDICARE

## 2019-03-13 ENCOUNTER — TELEPHONE (OUTPATIENT)
Dept: INTERNAL MEDICINE | Facility: CLINIC | Age: 79
End: 2019-03-13

## 2019-03-13 DIAGNOSIS — R53.1 WEAKNESS: ICD-10-CM

## 2019-03-13 PROCEDURE — 97110 THERAPEUTIC EXERCISES: CPT | Mod: PO

## 2019-03-13 NOTE — TELEPHONE ENCOUNTER
Returned call to [facility]. No answer. Left message to call back to discuss.

## 2019-03-13 NOTE — PROGRESS NOTES
"                                                    Physical Therapy daily note     Name: Zack Hopper  Clinic Number: [id number]  Diagnosis:   Encounter Diagnosis   Name Primary?   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 had to leave early)  1:1 time:50    Subjective     Pt reports she is doing pretty well. Notes that she always feels better after therapy.   Pain Scale: before treatment: 3  currently (L) lower back/glute ; after treatment: 0/10    Objective          Therapeutic exercise: Zack received therapeutic exercises to develop strength, endurance, ROM, flexibility and posture for 40 minutes including:     warm-up  · recumbent bike: L1 5' (pilow behind back for comfort) --NP  Supine:   · figure four piriformis stretch bilaterally: 2x30"  · DKTC: 2x30 sec.   · push/pull (right anterior/left ilial posterior): 3x5"  · Bridges with BTB: 3 x 15 pelvic tilt first  · Knee to chest with T-ball + red sports cord pull down:  3x8  Sidelying:   · s/l clams with 2#: 3x12  · Reverse clams (With small ball between legs): 3 x 12  Sitting:    Standing:  · TPR with lacrosse ball left piriformis: 3x30 sec.   · Anti-rotational walk-out to Paloff press with OTB 3x10 bilaterally  · fwd/backwards walking: 3 laps along the length of the mirror  · lateral walking with BTB at knees along length of mirror (1 laps with knees straight and another lap with mini-squat)  · Shuttle kick backs with 1 black band x 10 each  Machines  · sidelying single leg squat 1 black band: " 2x8 bilaterally    Manual therapy: Zack received the following manual therapy techniques: Joint mobilizations and Soft tissue Mobilization were applied to: piriformis and lumbar spine for 15 minutes.    Manual techniques include:  Soft tissue mobilization:  · prone:    · MFR left piriformis  · CFM left piriformis insertion  · c/r left piriformis  Joint mobilization:    · lumbar p/a Gr III  · lateral hip distraction left  · Long axis distraction (L)LE with IR and ER  · left hip posterolateral glides Gr III    Written Home Exercises Provided:Patient educated to continue with previously issued HEP to tolerance.  Pt demo good understanding of the education provided. Zack demonstrated good and fair return demonstration of activities.     Pt. education:  · Posture reeducation, body mechanics, HEP, activity modification/avoidance  · No spiritual or educational barriers to learning provided  · Pt has no cultural, educational or language barriers to learning      PT/PTA met face to face to discuss patient's treatment plan and progress towards established goals.  Treatment will be continued as described in initial report/eval and progress notes.    Assessment      Pt. had good tolerance to manual therapy and exercise tolerance denying increased pain. Pt. has improved gait mechanics with verbal cuing and slowed marty. Pt. is slowy progressing towards goals.    Will continue to focus on increased hip joint mobility, core hip lumbopelvic stabilization, and gait mechanics. Pt will continue to benefit from skilled outpatient physical therapy to address the remaining functional deficits, provide pt/family education, and to maximize pt's level of independence in the home and community environment. .      Anticipated barriers to physical therapy: chronicity of condition    · Pt's spiritual, cultural and educational needs considered and pt agreeable to plan of care and goals as stated below:   GOALS:   Short Term Goals:  4  weeks  1. Report decreased lumbosacral pain </= 5/10 at worst to increase tolerance for prolonged sitting/standing  2. Pt. to demonstrate proper cervical and scapula retraction requiring min. to no verbal cues from PT  3. Pt. to demonstrate increased MMT for core/lumbar paraspinals to 3/5 to increase endurance with prolonged sitting/standing.  4. Pt. to demonstrate increased MMT for left gluteus medius to 3+/5  to increase stability during community ambulation  5. Pt. to demonstrate increased MMT for left hip flexor to 4/5 to increase ability to clear toes during gait/stair negotiation. MET  6. Pt to tolerate HEP to improve ROM and independence with ADL's MET  7. Pt. to be independent with symptom management     Long Term Goals: 8 weeks  1. Report decreased lumbosacral pain </=  2/10 at worst to increase tolerance for prolonged sitting/standing  2. Pt. to demonstrate proper cervical and scapula retraction requiring no verbal cues from PT  3. Increase thoracic joint mobility to 3-/6 to promote greater ease with self care skills  4. Increase lumbar joint mobility to 3-/6 to promote greater ease with prolonged sitting/standing  5. Pt. to demonstrate increased MMT for core/lumbar paraspinals to 3+/5 to increase endurance with prolonged sitting.   6. Pt. to demonstrate increased MMT for bilateral gluteus medius to 4+/5  to increase stability during ambulation on uneven surfaces.  7. Pt. to demonstrate increased MMT for bilateral hip flexor to 4+/5 to increase tolerance for ADL and work activities.   8. Pt to be independent with HEP to improve ROM and independence with ADL's  9. Pt. to be able to resume leisure work out with manageable symptoms     Plan   Continue with established Plan of Care towards PT goals with focus on core /LE strength , flexibility ex's. .

## 2019-03-13 NOTE — TELEPHONE ENCOUNTER
Spoke to pt. Pt stated that she would be willing to try one of the recommended sleep medications to see if it helps.  Please advise.

## 2019-03-13 NOTE — TELEPHONE ENCOUNTER
----- Message from Alondra Thompson sent at 3/13/2019 11:37 AM CDT -----  [facility] called to get prior authorization for the medication zolpidem (AMBIEN) .  Please call [phone]

## 2019-03-14 RX ORDER — TRAZODONE HYDROCHLORIDE 50 MG/1
TABLET ORAL
Qty: 90 TABLET | Refills: 0 | OUTPATIENT
Start: 2019-03-14

## 2019-03-14 RX ORDER — TRAZODONE HYDROCHLORIDE 50 MG/1
50 TABLET ORAL NIGHTLY
Qty: 30 TABLET | Refills: 0 | Status: SHIPPED | OUTPATIENT
Start: 2019-03-14 | End: 2019-06-12

## 2019-03-18 ENCOUNTER — CLINICAL SUPPORT (OUTPATIENT)
Dept: REHABILITATION | Facility: HOSPITAL | Age: 79
End: 2019-03-18
Attending: PAIN MEDICINE
Payer: MEDICARE

## 2019-03-18 DIAGNOSIS — R53.1 WEAKNESS: ICD-10-CM

## 2019-03-18 PROCEDURE — 97110 THERAPEUTIC EXERCISES: CPT | Mod: PO

## 2019-03-18 NOTE — PROGRESS NOTES
"                                                    Physical Therapy daily note     Name: Zack Hopper  Clinic Number: [id number]  Diagnosis:   Encounter Diagnosis   Name Primary?   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 Pt. notes some increased compliance with HEP; however, not as often as recommended.   Pain Scale: before treatment: 3  currently (L) lower back/glute ; after treatment: 0/10    Objective      Therapeutic exercise: Zack received therapeutic exercises to develop strength, endurance, ROM, flexibility and posture for 40 minutes including:     warm-up  · recumbent bike: L1 5' (pilow behind back for comfort)   Supine:   · figure four piriformis stretch bilaterally: 2x30"  · DKTC: 2x30 sec.   · push/pull (right anterior/left ilial posterior): 3x5"  · Bridges with BTB: 3 x 15 pelvic tilt first  · Knee to chest with T-ball + red sports cord pull down:  3x8  Sidelying:   · s/l clams with 2#: 3x12  · Reverse clams (With small ball between legs): 3 x 12  Sitting:    Standing:  · TPR with lacrosse ball left piriformis: 3x30 sec.   · Anti-rotational walk-out to Paloff press with OTB 3x10 bilaterally  · fwd/backwards walking: 3 laps along the length of the mirror  · lateral walking with BTB at knees along length of mirror (1 laps with knees straight and another lap with mini-squat)  · Shuttle kick backs with 1 black band x 10 " each  Machines  · sidelying single leg squat 1 black band: 2x8 bilaterally    Manual therapy: Zack received the following manual therapy techniques: Joint mobilizations and Soft tissue Mobilization were applied to: piriformis and lumbar spine for 15 minutes.    Manual techniques include:  Soft tissue mobilization:  · prone:    · MFR left piriformis  · CFM left piriformis insertion  · c/r left piriformis  Joint mobilization:    · lumbar p/a Gr III  · lateral hip distraction left  · Long axis distraction (L)LE with IR and ER  · left hip posterolateral glides Gr III    Written Home Exercises Provided:Patient educated to continue with previously issued HEP to tolerance.  Pt demo good understanding of the education provided. Zack demonstrated good and fair return demonstration of activities.     Pt. education:  · Posture reeducation, body mechanics, HEP, activity modification/avoidance  · No spiritual or educational barriers to learning provided  · Pt has no cultural, educational or language barriers to learning      PT/PTA met face to face to discuss patient's treatment plan and progress towards established goals.  Treatment will be continued as described in initial report/eval and progress notes.    Assessment      Pt.had good tolerance to exercise progression denying increased pain. Pt. still requires recurrent reminders to slow marty, stabilize hip joint, and provide eccentric control during gait as slight limp remains. Pt. is slowly progressing towards goals.     Will continue to focus on increased hip joint mobility, core hip lumbopelvic stabilization, and gait mechanics. Pt will continue to benefit from skilled outpatient physical therapy to address the remaining functional deficits, provide pt/family education, and to maximize pt's level of independence in the home and community environment. .      Anticipated barriers to physical therapy: chronicity of condition    · Pt's spiritual, cultural and  educational needs considered and pt agreeable to plan of care and goals as stated below:   GOALS:   Short Term Goals:  4 weeks  1. Report decreased lumbosacral pain </= 5/10 at worst to increase tolerance for prolonged sitting/standing  2. Pt. to demonstrate proper cervical and scapula retraction requiring min. to no verbal cues from PT  3. Pt. to demonstrate increased MMT for core/lumbar paraspinals to 3/5 to increase endurance with prolonged sitting/standing.  4. Pt. to demonstrate increased MMT for left gluteus medius to 3+/5  to increase stability during community ambulation  5. Pt. to demonstrate increased MMT for left hip flexor to 4/5 to increase ability to clear toes during gait/stair negotiation. MET  6. Pt to tolerate HEP to improve ROM and independence with ADL's MET  7. Pt. to be independent with symptom management     Long Term Goals: 8 weeks  1. Report decreased lumbosacral pain </=  2/10 at worst to increase tolerance for prolonged sitting/standing  2. Pt. to demonstrate proper cervical and scapula retraction requiring no verbal cues from PT  3. Increase thoracic joint mobility to 3-/6 to promote greater ease with self care skills  4. Increase lumbar joint mobility to 3-/6 to promote greater ease with prolonged sitting/standing  5. Pt. to demonstrate increased MMT for core/lumbar paraspinals to 3+/5 to increase endurance with prolonged sitting.   6. Pt. to demonstrate increased MMT for bilateral gluteus medius to 4+/5  to increase stability during ambulation on uneven surfaces.  7. Pt. to demonstrate increased MMT for bilateral hip flexor to 4+/5 to increase tolerance for ADL and work activities.   8. Pt to be independent with HEP to improve ROM and independence with ADL's  9. Pt. to be able to resume leisure work out with manageable symptoms     Plan   Continue with established Plan of Care towards PT goals with focus on core /LE strength , flexibility ex's. .

## 2019-03-20 ENCOUNTER — CLINICAL SUPPORT (OUTPATIENT)
Dept: REHABILITATION | Facility: HOSPITAL | Age: 79
End: 2019-03-20
Attending: PAIN MEDICINE
Payer: MEDICARE

## 2019-03-20 DIAGNOSIS — R53.1 WEAKNESS: ICD-10-CM

## 2019-03-20 PROCEDURE — 97110 THERAPEUTIC EXERCISES: CPT | Mod: PO

## 2019-03-20 NOTE — PROGRESS NOTES
"                                                    Physical Therapy daily note     Name: Zack Pavon [facility]  Clinic Number: [id number]  Diagnosis:   Encounter Diagnosis   Name Primary?   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 Notes that she is trying to exercises more consistently, but has not as of yet. Notes this week she has a goal to complete all of her exercises daily.   Pain Scale: before treatment: 3  currently (L) lower back/glute ; after treatment: 0/10    Objective      Therapeutic exercise: Zack received therapeutic exercises to develop strength, endurance, ROM, flexibility and posture for 40 minutes including:     warm-up  · TM: 1.2 mph x8 min.  Supine:   · figure four piriformis stretch bilaterally: 2x30"  · DKTC: 2x30 sec.   · push/pull (right anterior/left ilial posterior): 3x5"  · [place] tubing lat. pull down with Bridges with BTB: 3 x 15 pelvic tilt first  Sidelying:   · s/l clams with 2#: 3x12  · Reverse clams (With small ball between legs): 3 x 12  Sitting:    Standing:  · TPR with lacrosse ball left piriformis: 3x30 sec.   · Anti-rotational walk-out to Paloff press with OTB 3x10 bilaterally  · monsterwalks with BTB a knees fwd/backwards: 1 laps along the length of the mirror  · lateral walking with BTB at knees along length of mirror (1 laps with knees straight and another lap with mini-squat)  · hip hikes: 2x8 bilaterally (with verbal cues and " "demonstration)  Machines  · sidelying single leg squat 1 black band: 2x8 bilaterally  · Shuttle kick backs with 1 black band 2x8 each    Manual therapy: Zack received the following manual therapy techniques: Joint mobilizations and Soft tissue Mobilization were applied to: piriformis and lumbar spine for 8 minutes.    Manual techniques include:  Soft tissue mobilization:  · right sidelying: Vacuum/cupping: STM was performed to left piriformis/gluteus medius/TFL to decrease muscle tightness, increase circulation and promote healing process for 8 min. The pt's skin was monitored for redness adjusting pressure as needed. The pt was instructed in possible side effects of bruising and/or soreness.   ·   Joint mobilization:  NP    · lumbar p/a Gr III  · lateral hip distraction left  · Long axis distraction (L)LE with IR and ER  · left hip posterolateral glides Gr III    Kinesiotaping  Patient was screened for use of kinesiotape and was cleared for use. Pt. received kinesiotaping on left piriformis inhibition with a "Y" strip   1. Has the patient ever had a reaction to the adhesive in bandaids? Yes/No: No  2. Has the patient ever uses athletic/kinesiotape in the past? Yes/No: No  3. Is the patient hemodynamically impaired (PE, DVT, CHF, Kidney failure)? Yes/No: No  4. Can the PT/PTA apply the tape to your skin? Yes/No: Yes    Patient was instructed on duration to wear the tape, proper drying techniques for the tape, and to remove the tape if he/she has any skin irritation: including, but not exclusive to, redness/itchiness/discomfort. Patient verbalized understanding of these instructions.    Written Home Exercises Provided:Patient educated to continue with previously issued HEP to tolerance.  Pt demo good understanding of the education provided. Zack demonstrated good and fair return demonstration of activities.     Pt. education:  · Posture reeducation, body mechanics, HEP, activity modification/avoidance  · No " spiritual or educational barriers to learning provided  · Pt has no cultural, educational or language barriers to learning      Assessment      Pt. had good tolerance to manual therapy and exercise progression denying increased pain. Pt. reported mild alleviation of pain after cupping. Will reassess necessity and possible introduction of dry needling next session if no change in symptoms occur with current intervention. Pt. is slowly progressing towards goals.     Will continue to focus on increased hip joint mobility, core hip lumbopelvic stabilization, and gait mechanics. Pt will continue to benefit from skilled outpatient physical therapy to address the remaining functional deficits, provide pt/family education, and to maximize pt's level of independence in the home and community environment. .      Anticipated barriers to physical therapy: chronicity of condition    · Pt's spiritual, cultural and educational needs considered and pt agreeable to plan of care and goals as stated below:   GOALS:   Short Term Goals:  4 weeks  1. Report decreased lumbosacral pain </= 5/10 at worst to increase tolerance for prolonged sitting/standing  2. Pt. to demonstrate proper cervical and scapula retraction requiring min. to no verbal cues from PT  3. Pt. to demonstrate increased MMT for core/lumbar paraspinals to 3/5 to increase endurance with prolonged sitting/standing.  4. Pt. to demonstrate increased MMT for left gluteus medius to 3+/5  to increase stability during community ambulation  5. Pt. to demonstrate increased MMT for left hip flexor to 4/5 to increase ability to clear toes during gait/stair negotiation. MET  6. Pt to tolerate HEP to improve ROM and independence with ADL's MET  7. Pt. to be independent with symptom management     Long Term Goals: 8 weeks  1. Report decreased lumbosacral pain </=  2/10 at worst to increase tolerance for prolonged sitting/standing  2. Pt. to demonstrate proper cervical and scapula  retraction requiring no verbal cues from PT  3. Increase thoracic joint mobility to 3-/6 to promote greater ease with self care skills  4. Increase lumbar joint mobility to 3-/6 to promote greater ease with prolonged sitting/standing  5. Pt. to demonstrate increased MMT for core/lumbar paraspinals to 3+/5 to increase endurance with prolonged sitting.   6. Pt. to demonstrate increased MMT for bilateral gluteus medius to 4+/5  to increase stability during ambulation on uneven surfaces.  7. Pt. to demonstrate increased MMT for bilateral hip flexor to 4+/5 to increase tolerance for ADL and work activities.   8. Pt to be independent with HEP to improve ROM and independence with ADL's  9. Pt. to be able to resume leisure work out with manageable symptoms     Plan   Continue with established Plan of Care towards PT goals with focus on core /LE strength , flexibility ex's. .

## 2019-03-27 ENCOUNTER — CLINICAL SUPPORT (OUTPATIENT)
Dept: REHABILITATION | Facility: HOSPITAL | Age: 79
End: 2019-03-27
Attending: PAIN MEDICINE
Payer: MEDICARE

## 2019-03-27 DIAGNOSIS — R53.1 WEAKNESS: ICD-10-CM

## 2019-03-27 PROCEDURE — 97110 THERAPEUTIC EXERCISES: CPT | Mod: HCNC,PO

## 2019-03-27 NOTE — PROGRESS NOTES
"                                                    Physical Therapy daily note     Name: Zack Hopper  Clinic Number: [id number]  Diagnosis:   Encounter Diagnosis   Name Primary?   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   Pain Scale: before treatment: 3  currently (L) lower back/glute ; after treatment: 0/10    Objective      Therapeutic exercise: Zack received therapeutic exercises to develop strength, endurance, ROM, flexibility and posture for 40 minutes including:     warm-up  · TM: 1.2 mph x8 min.  Supine:   · figure four piriformis stretch bilaterally: 2x30"  · DKTC: 2x30 sec.   · push/pull (right anterior/left ilial posterior): 3x5"  · [place] tubing lat. pull down with Bridges with BTB: 3 x 15 pelvic tilt first  Sidelying:   · s/l clams with 2#: 3x12  · Reverse clams (With small ball between legs): 3 x 12  Sitting:    Standing:  · TPR with lacrosse ball left piriformis: 3x30 sec.   · Anti-rotational walk-out to [place] press with OTB 3x10 bilaterally  · monsterwalks with BTB a knees fwd/backwards: 1 laps along the length of the mirror  · lateral walking with BTB at knees along length of mirror (1 laps with knees straight and another lap with mini-squat)  · hip hikes: 2x8 bilaterally (with verbal cues and demonstration)  Machines  · sidelying single leg squat 1 black band: 2x8 bilaterally  · Shuttle kick backs " "with 1 black band 2x8 each    Manual therapy: Zack received the following manual therapy techniques: Joint mobilizations and Soft tissue Mobilization were applied to: piriformis and lumbar spine for 8 minutes.    Manual techniques include:  Soft tissue mobilization:  · right sidelying: Vacuum/cupping: STM was performed to left piriformis/gluteus medius/TFL to decrease muscle tightness, increase circulation and promote healing process for 8 min. The pt's skin was monitored for redness adjusting pressure as needed. The pt was instructed in possible side effects of bruising and/or soreness.   Zack  received Dry needling with trigger point/manual therapy techniques was performed as per the followin mm left piriformis origin, muscle belly, and insertion; TFL    Dry needling consent form was reviewed with the patient addressing all questions and concerns and signed by patient.  Patient also gave verbal consent to undergo dry needling.  Copy of the consent form was not provided to patient as requested by patient.    All needles were removed and changes in signs and symptoms were noted.  Dry needling was performed to decrease inflammation, increase circulation, decrease pain and restore homeostasis.      Joint mobilization:     · lumbar p/a Gr III NP  · lateral hip distraction left  · Long axis distraction (L)LE with IR and ER  · left hip posterolateral glides Gr III    Kinesiotaping  Patient was screened for use of kinesiotape and was cleared for use. Pt. received kinesiotaping on left piriformis inhibition with a "star" facial correction  1. Has the patient ever had a reaction to the adhesive in bandaids? Yes/No: No  2. Has the patient ever uses athletic/kinesiotape in the past? Yes/No: No  3. Is the patient hemodynamically impaired (PE, DVT, CHF, Kidney failure)? Yes/No: No  4. Can the PT/PTA apply the tape to your skin? Yes/No: Yes    Patient was instructed on duration to wear the tape, proper drying techniques " for the tape, and to remove the tape if he/she has any skin irritation: including, but not exclusive to, redness/itchiness/discomfort. Patient verbalized understanding of these instructions.    Written Home Exercises Provided:Patient educated to continue with previously issued HEP to tolerance.  Pt demo good understanding of the education provided. Zack demonstrated good and fair return demonstration of activities.     Pt. education:  · Posture reeducation, body mechanics, HEP, activity modification/avoidance  · No spiritual or educational barriers to learning provided  · Pt has no cultural, educational or language barriers to learning      Assessment      Pt. had really good tolerance to dry needling noting mild relief. Good tolerance to exercise progression especially with increased verbal cuing for gluteus medius activation during heel strike to midstance to improve pelvic stability and prevent undue stress on her low back. Pt. verbalized understanding and demonstrated increased consistency with slowed marty. Pt. is slowly progressing towards goals.     Will continue to focus on increased hip joint mobility, core hip lumbopelvic stabilization, and gait mechanics. Pt will continue to benefit from skilled outpatient physical therapy to address the remaining functional deficits, provide pt/family education, and to maximize pt's level of independence in the home and community environment. .      Anticipated barriers to physical therapy: chronicity of condition    · Pt's spiritual, cultural and educational needs considered and pt agreeable to plan of care and goals as stated below:   GOALS:   Short Term Goals:  4 weeks  1. Report decreased lumbosacral pain </= 5/10 at worst to increase tolerance for prolonged sitting/standing MET 3/27/2019  2. Pt. to demonstrate proper cervical and scapula retraction requiring min. to no verbal cues from PT progressing 3/27/2019  3. Pt. to demonstrate increased MMT for core/lumbar  paraspinals to 3/5 to increase endurance with prolonged sitting/standing.progressing 3/27/2019  4. Pt. to demonstrate increased MMT for left gluteus medius to 3+/5  to increase stability during community ambulation progressing 3/27/2019  5. Pt. to demonstrate increased MMT for left hip flexor to 4/5 to increase ability to clear toes during gait/stair negotiation. MET  6. Pt to tolerate HEP to improve ROM and independence with ADL's MET  7. Pt. to be independent with symptom management progressing 3/27/2019     Long Term Goals: 8 weeks  1. Report decreased lumbosacral pain </=  2/10 at worst to increase tolerance for prolonged sitting/standing progressing 3/27/2019  2. Pt. to demonstrate proper cervical and scapula retraction requiring no verbal cues from PT progressing 3/27/2019  3. Increase thoracic joint mobility to 3-/6 to promote greater ease with self care skills progressing 3/27/2019  4. Increase lumbar joint mobility to 3-/6 to promote greater ease with prolonged sitting/standing progressing 3/27/2019  5. Pt. to demonstrate increased MMT for core/lumbar paraspinals to 3+/5 to increase endurance with prolonged sitting. progressing 3/27/2019  6. Pt. to demonstrate increased MMT for bilateral gluteus medius to 4+/5  to increase stability during ambulation on uneven surfaces. progressing 3/27/2019  7. Pt. to demonstrate increased MMT for bilateral hip flexor to 4+/5 to increase tolerance for ADL and work activities. progressing 3/27/2019  8. Pt to be independent with HEP to improve ROM and independence with ADL's progressing 3/27/2019  9. Pt. to be able to resume leisure work out with manageable symptoms progressing 3/27/2019     Plan   Continue with established Plan of Care towards PT goals with focus on core /LE strength , flexibility ex's. .

## 2019-04-02 NOTE — PROGRESS NOTES
"                                                    Physical Therapy daily note     Name: Zack Hopper  Clinic Number: [id number]  Diagnosis:   Encounter Diagnosis   Name Primary?   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 Pt. notes that she has been sleeping a little bit better.   Pain Scale: before treatment: 3  currently (L) lower back/glute ; after treatment: 0/10    Objective      Therapeutic exercise: Zack received therapeutic exercises to develop strength, endurance, ROM, flexibility and posture for 40 minutes including:     warm-up  · TM: 1.2 mph x8 min.  Supine:   · figure four piriformis stretch bilaterally: 2x30"  · DKTC: 2x30 sec.   · push/pull (right anterior/left ilial posterior): 3x5"  · [place] sport cord lat. pull down with Bridges with BTB: 3 x 15 pelvic tilt first  Sidelying:   · s/l clams with 2#: 3x12  · Reverse clams (With small ball between legs): 3 x 12  Sitting:    Standing:  · TPR with lacrosse ball left piriformis: 3x30 sec.   · Anti-rotational walk-out to Paloff press with OTB 3x10 bilaterally  · monsterwalks with BTB a knees fwd/backwards: 1 laps along the length of the mirror  · lateral walking with BTB at knees along length of mirror (1 laps with knees straight and another lap with mini-squat)  · hip hikes: 2x9 bilaterally  Machines  · sidelying single leg squat 1 black band: 2x8 bilaterally  · Shuttle kick backs " "with 1 black band 2x8 each    Manual therapy: Zack received the following manual therapy techniques: Joint mobilizations and Soft tissue Mobilization were applied to: piriformis and lumbar spine for 8 minutes.    Manual techniques include:  Soft tissue mobilization:  Zack  received Dry needling with trigger point/manual therapy techniques was performed as per the followin mm left piriformis origin, muscle belly, and insertion; and bilateral QL; 60 mm needle for TFL    Dry needling consent form was reviewed with the patient addressing all questions and concerns and signed by patient.  Patient also gave verbal consent to undergo dry needling.  Copy of the consent form was not provided to patient as requested by patient.    All needles were removed and changes in signs and symptoms were noted.  Dry needling was performed to decrease inflammation, increase circulation, decrease pain and restore homeostasis.      Joint mobilization: NP    · lumbar p/a Gr III NP  · lateral hip distraction left  · Long axis distraction (L)LE with IR and ER  · left hip posterolateral glides Gr III    Kinesiotaping  Patient was screened for use of kinesiotape and was cleared for use. Pt. received kinesiotaping on left piriformis inhibition with a "star" facial correction  1. Has the patient ever had a reaction to the adhesive in bandaids? Yes/No: No  2. Has the patient ever uses athletic/kinesiotape in the past? Yes/No: No  3. Is the patient hemodynamically impaired (PE, DVT, CHF, Kidney failure)? Yes/No: No  4. Can the PT/PTA apply the tape to your skin? Yes/No: Yes    Patient was instructed on duration to wear the tape, proper drying techniques for the tape, and to remove the tape if he/she has any skin irritation: including, but not exclusive to, redness/itchiness/discomfort. Patient verbalized understanding of these instructions.    Written Home Exercises Provided:Patient educated to continue with previously issued HEP to " tolerance.  Pt demo good understanding of the education provided. Zack demonstrated good and fair return demonstration of activities.     Pt. education:  · Posture reeducation, body mechanics, HEP, activity modification/avoidance  · No spiritual or educational barriers to learning provided  · Pt has no cultural, educational or language barriers to learning      Assessment   Pt. had really good tolerance to dry needling noting immediate relief. Pt. had good tolerance to exercise progression denying increased pain as a result. Pt. is slowly progressing towards goals.     Will continue to focus on increased hip joint mobility, core hip lumbopelvic stabilization, and gait mechanics. Pt will continue to benefit from skilled outpatient physical therapy to address the remaining functional deficits, provide pt/family education, and to maximize pt's level of independence in the home and community environment. .      Anticipated barriers to physical therapy: chronicity of condition    · Pt's spiritual, cultural and educational needs considered and pt agreeable to plan of care and goals as stated below:   GOALS:   Short Term Goals:  4 weeks  1. Report decreased lumbosacral pain </= 5/10 at worst to increase tolerance for prolonged sitting/standing MET 4/3/2019  2. Pt. to demonstrate proper cervical and scapula retraction requiring min. to no verbal cues from PT progressing 4/3/2019  3. Pt. to demonstrate increased MMT for core/lumbar paraspinals to 3/5 to increase endurance with prolonged sitting/standing.progressing 4/3/2019  4. Pt. to demonstrate increased MMT for left gluteus medius to 3+/5  to increase stability during community ambulation progressing 4/3/2019  5. Pt. to demonstrate increased MMT for left hip flexor to 4/5 to increase ability to clear toes during gait/stair negotiation. MET  6. Pt to tolerate HEP to improve ROM and independence with ADL's MET  7. Pt. to be independent with symptom management progressing  4/3/2019     Long Term Goals: 8 weeks  1. Report decreased lumbosacral pain </=  2/10 at worst to increase tolerance for prolonged sitting/standing progressing 4/3/2019  2. Pt. to demonstrate proper cervical and scapula retraction requiring no verbal cues from PT progressing 4/3/2019  3. Increase thoracic joint mobility to 3-/6 to promote greater ease with self care skills progressing 4/3/2019  4. Increase lumbar joint mobility to 3-/6 to promote greater ease with prolonged sitting/standing progressing 4/3/2019  5. Pt. to demonstrate increased MMT for core/lumbar paraspinals to 3+/5 to increase endurance with prolonged sitting. progressing 4/3/2019  6. Pt. to demonstrate increased MMT for bilateral gluteus medius to 4+/5  to increase stability during ambulation on uneven surfaces. progressing 4/3/2019  7. Pt. to demonstrate increased MMT for bilateral hip flexor to 4+/5 to increase tolerance for ADL and work activities. progressing 4/3/2019  8. Pt to be independent with HEP to improve ROM and independence with ADL's progressing 4/3/2019  9. Pt. to be able to resume leisure work out with manageable symptoms progressing 4/3/2019     Plan   Continue with established Plan of Care towards PT goals with focus on core /LE strength , flexibility ex's. .

## 2019-04-03 ENCOUNTER — CLINICAL SUPPORT (OUTPATIENT)
Dept: REHABILITATION | Facility: HOSPITAL | Age: 79
End: 2019-04-03
Attending: PAIN MEDICINE
Payer: MEDICARE

## 2019-04-03 DIAGNOSIS — R53.1 WEAKNESS: ICD-10-CM

## 2019-04-03 PROCEDURE — 97110 THERAPEUTIC EXERCISES: CPT | Mod: HCNC,PO

## 2019-04-04 NOTE — PLAN OF CARE
"                                                    Physical Therapy daily note     Name: Zack Hopper  Clinic Number: [id number]  Diagnosis:   Encounter Diagnosis   Name Primary?   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 Pt. notes that she has been sleeping a little bit better.   Pain Scale: before treatment: 3  currently (L) lower back/glute ; after treatment: 0/10    Objective      Therapeutic exercise: Zack received therapeutic exercises to develop strength, endurance, ROM, flexibility and posture for 40 minutes including:     warm-up  · TM: 1.2 mph x8 min.  Supine:   · figure four piriformis stretch bilaterally: 2x30"  · DKTC: 2x30 sec.   · push/pull (right anterior/left ilial posterior): 3x5"  · [place] sport cord lat. pull down with Bridges with BTB: 3 x 15 pelvic tilt first  Sidelying:   · s/l clams with 2#: 3x12  · Reverse clams (With small ball between legs): 3 x 12  Sitting:    Standing:  · TPR with lacrosse ball left piriformis: 3x30 sec.   · Anti-rotational walk-out to [place] press with OTB 3x10 bilaterally  · monsterwalks with BTB a knees fwd/backwards: 1 laps along the length of the mirror  · lateral walking with BTB at knees along length of mirror (1 laps with knees straight and another lap with mini-squat)  · hip hikes: 2x9 bilaterally  Machines  · sidelying single leg squat 1 black band: 2x8 bilaterally  · Shuttle kick backs " "with 1 black band 2x8 each    Manual therapy: Zack received the following manual therapy techniques: Joint mobilizations and Soft tissue Mobilization were applied to: piriformis and lumbar spine for 8 minutes.    Manual techniques include:  Soft tissue mobilization:  Zack  received Dry needling with trigger point/manual therapy techniques was performed as per the followin mm left piriformis origin, muscle belly, and insertion; and bilateral QL; 60 mm needle for TFL    Dry needling consent form was reviewed with the patient addressing all questions and concerns and signed by patient.  Patient also gave verbal consent to undergo dry needling.  Copy of the consent form was not provided to patient as requested by patient.    All needles were removed and changes in signs and symptoms were noted.  Dry needling was performed to decrease inflammation, increase circulation, decrease pain and restore homeostasis.      Joint mobilization: NP    · lumbar p/a Gr III NP  · lateral hip distraction left  · Long axis distraction (L)LE with IR and ER  · left hip posterolateral glides Gr III    Kinesiotaping  Patient was screened for use of kinesiotape and was cleared for use. Pt. received kinesiotaping on left piriformis inhibition with a "star" facial correction  1. Has the patient ever had a reaction to the adhesive in bandaids? Yes/No: No  2. Has the patient ever uses athletic/kinesiotape in the past? Yes/No: No  3. Is the patient hemodynamically impaired (PE, DVT, CHF, Kidney failure)? Yes/No: No  4. Can the PT/PTA apply the tape to your skin? Yes/No: Yes    Patient was instructed on duration to wear the tape, proper drying techniques for the tape, and to remove the tape if he/she has any skin irritation: including, but not exclusive to, redness/itchiness/discomfort. Patient verbalized understanding of these instructions.    Written Home Exercises Provided:Patient educated to continue with previously issued HEP to " tolerance.  Pt demo good understanding of the education provided. Zack demonstrated good and fair return demonstration of activities.     Pt. education:  · Posture reeducation, body mechanics, HEP, activity modification/avoidance  · No spiritual or educational barriers to learning provided  · Pt has no cultural, educational or language barriers to learning      Assessment   Pt. had really good tolerance to dry needling noting immediate relief. Pt. had good tolerance to exercise progression denying increased pain as a result. Pt. is slowly progressing towards goals.     Will continue to focus on increased hip joint mobility, core hip lumbopelvic stabilization, and gait mechanics. Pt will continue to benefit from skilled outpatient physical therapy to address the remaining functional deficits, provide pt/family education, and to maximize pt's level of independence in the home and community environment. .      Anticipated barriers to physical therapy: chronicity of condition    · Pt's spiritual, cultural and educational needs considered and pt agreeable to plan of care and goals as stated below:   GOALS:   Short Term Goals:  4 weeks  1. Report decreased lumbosacral pain </= 5/10 at worst to increase tolerance for prolonged sitting/standing MET 4/3/2019  2. Pt. to demonstrate proper cervical and scapula retraction requiring min. to no verbal cues from PT progressing 4/3/2019  3. Pt. to demonstrate increased MMT for core/lumbar paraspinals to 3/5 to increase endurance with prolonged sitting/standing.progressing 4/3/2019  4. Pt. to demonstrate increased MMT for left gluteus medius to 3+/5  to increase stability during community ambulation progressing 4/3/2019  5. Pt. to demonstrate increased MMT for left hip flexor to 4/5 to increase ability to clear toes during gait/stair negotiation. MET  6. Pt to tolerate HEP to improve ROM and independence with ADL's MET  7. Pt. to be independent with symptom management progressing  4/3/2019     Long Term Goals: 8 weeks  1. Report decreased lumbosacral pain </=  2/10 at worst to increase tolerance for prolonged sitting/standing progressing 4/3/2019  2. Pt. to demonstrate proper cervical and scapula retraction requiring no verbal cues from PT progressing 4/3/2019  3. Increase thoracic joint mobility to 3-/6 to promote greater ease with self care skills progressing 4/3/2019  4. Increase lumbar joint mobility to 3-/6 to promote greater ease with prolonged sitting/standing progressing 4/3/2019  5. Pt. to demonstrate increased MMT for core/lumbar paraspinals to 3+/5 to increase endurance with prolonged sitting. progressing 4/3/2019  6. Pt. to demonstrate increased MMT for bilateral gluteus medius to 4+/5  to increase stability during ambulation on uneven surfaces. progressing 4/3/2019  7. Pt. to demonstrate increased MMT for bilateral hip flexor to 4+/5 to increase tolerance for ADL and work activities. progressing 4/3/2019  8. Pt to be independent with HEP to improve ROM and independence with ADL's progressing 4/3/2019  9. Pt. to be able to resume leisure work out with manageable symptoms progressing 4/3/2019     Plan   Continue with established Plan of Care towards PT goals with focus on core /LE strength , flexibility ex's. .

## 2019-04-10 ENCOUNTER — CLINICAL SUPPORT (OUTPATIENT)
Dept: REHABILITATION | Facility: HOSPITAL | Age: 79
End: 2019-04-10
Attending: PAIN MEDICINE
Payer: MEDICARE

## 2019-04-10 DIAGNOSIS — R53.1 WEAKNESS: ICD-10-CM

## 2019-04-10 PROCEDURE — 97140 MANUAL THERAPY 1/> REGIONS: CPT | Mod: HCNC,PO

## 2019-04-10 PROCEDURE — 97110 THERAPEUTIC EXERCISES: CPT | Mod: HCNC,PO

## 2019-04-10 NOTE — PROGRESS NOTES
"                                                    Physical Therapy daily note     Name: Zack Pavon [facility]  Clinic Number: [id number]  Diagnosis:   Encounter Diagnosis   Name Primary?   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 Notes more relief first thing in the morning.  Pain Scale: before treatment: 3  currently (L) lower back/glute ; after treatment: 0/10    Objective      Therapeutic exercise: Zack received therapeutic exercises to develop strength, endurance, ROM, flexibility and posture for 40 minutes including:     warm-up  · TM: 1.2 mph x8 min.  Supine:   · figure four piriformis stretch bilaterally: 2x30"  · DKTC: 2x30 sec.   · push/pull (right anterior/left ilial posterior): 3x5"  · [place] sport cord lat. pull down with Bridges with BTB: 3 x 15  Sidelying:   · s/l clams with 2#: 3x15  · Reverse clams (With small ball between legs): 3 x 15  Sitting:    Standing:  · TPR with lacrosse ball left piriformis: 3x30 sec.   · walk out [place] press with OTB 3x12 bilaterally  · monsterwalks with BTB a knees fwd/backwards: 1 laps along the length of the mirror  · lateral walking with BTB at knees along length of mirror (1 laps with knees straight and another lap with mini-squat)  · hip hikes: 2x10 bilaterally  Machines  · sidelying single leg squat 1 black band: 2x10 bilaterally  · Shuttle kick backs with 1 black band 2x10 each    Manual therapy: Zack " "received the following manual therapy techniques: Joint mobilizations and Soft tissue Mobilization were applied to: piriformis and lumbar spine for 8 minutes.    Manual techniques include:  Soft tissue mobilization:  Zack  received Dry needling with trigger point/manual therapy techniques was performed as per the followin mm left piriformis origin, muscle belly, and insertion; and bilateral QL; 60 mm needle for TFL    Dry needling consent form was reviewed with the patient addressing all questions and concerns and signed by patient.  Patient also gave verbal consent to undergo dry needling.  Copy of the consent form was not provided to patient as requested by patient.    All needles were removed and changes in signs and symptoms were noted.  Dry needling was performed to decrease inflammation, increase circulation, decrease pain and restore homeostasis.      Joint mobilization: NP    · lumbar p/a Gr III NP  · lateral hip distraction left  · Long axis distraction (L)LE with IR and ER  · left hip posterolateral glides Gr III    Kinesiotaping  Patient was screened for use of kinesiotape and was cleared for use. Pt. received kinesiotaping on left piriformis inhibition with a "star" fascial correction not performed  1. Has the patient ever had a reaction to the adhesive in bandaids? Yes/No: No  2. Has the patient ever uses athletic/kinesiotape in the past? Yes/No: No  3. Is the patient hemodynamically impaired (PE, DVT, CHF, Kidney failure)? Yes/No: No  4. Can the PT/PTA apply the tape to your skin? Yes/No: Yes    Patient was instructed on duration to wear the tape, proper drying techniques for the tape, and to remove the tape if he/she has any skin irritation: including, but not exclusive to, redness/itchiness/discomfort. Patient verbalized understanding of these instructions.    Written Home Exercises Provided:Patient educated to continue with previously issued HEP to tolerance.  Pt demo good understanding of " the education provided. Zack demonstrated good and fair return demonstration of activities.     Pt. education:  · Posture reeducation, body mechanics, HEP, activity modification/avoidance  · No spiritual or educational barriers to learning provided  · Pt has no cultural, educational or language barriers to learning      Assessment   Pt. had really good tolerance to manual therapy and exercise progression denying increased pain. Pt. is demonstrating improved exercise tolerance/endurance. Pt. is progressing well towards goals.     Will continue to focus on increased hip joint mobility, core hip lumbopelvic stabilization, and gait mechanics. Pt will continue to benefit from skilled outpatient physical therapy to address the remaining functional deficits, provide pt/family education, and to maximize pt's level of independence in the home and community environment. .      Anticipated barriers to physical therapy: chronicity of condition    · Pt's spiritual, cultural and educational needs considered and pt agreeable to plan of care and goals as stated below:   GOALS:   Short Term Goals:  4 weeks  1. Report decreased lumbosacral pain </= 5/10 at worst to increase tolerance for prolonged sitting/standing MET 4/10/2019  2. Pt. to demonstrate proper cervical and scapula retraction requiring min. to no verbal cues from PT progressing 4/10/2019  3. Pt. to demonstrate increased MMT for core/lumbar paraspinals to 3/5 to increase endurance with prolonged sitting/standing.progressing 4/10/2019  4. Pt. to demonstrate increased MMT for left gluteus medius to 3+/5  to increase stability during community ambulation progressing 4/10/2019  5. Pt. to demonstrate increased MMT for left hip flexor to 4/5 to increase ability to clear toes during gait/stair negotiation. MET  6. Pt to tolerate HEP to improve ROM and independence with ADL's MET  7. Pt. to be independent with symptom management progressing 4/10/2019     Long Term Goals: 8  weeks  1. Report decreased lumbosacral pain </=  2/10 at worst to increase tolerance for prolonged sitting/standing progressing 4/10/2019  2. Pt. to demonstrate proper cervical and scapula retraction requiring no verbal cues from PT progressing 4/10/2019  3. Increase thoracic joint mobility to 3-/6 to promote greater ease with self care skills progressing 4/10/2019  4. Increase lumbar joint mobility to 3-/6 to promote greater ease with prolonged sitting/standing progressing 4/10/2019  5. Pt. to demonstrate increased MMT for core/lumbar paraspinals to 3+/5 to increase endurance with prolonged sitting. progressing 4/10/2019  6. Pt. to demonstrate increased MMT for bilateral gluteus medius to 4+/5  to increase stability during ambulation on uneven surfaces. progressing 4/10/2019  7. Pt. to demonstrate increased MMT for bilateral hip flexor to 4+/5 to increase tolerance for ADL and work activities. progressing 4/10/2019  8. Pt to be independent with HEP to improve ROM and independence with ADL's progressing 4/10/2019  9. Pt. to be able to resume leisure work out with manageable symptoms progressing 4/10/2019     Plan   Continue with established Plan of Care towards PT goals with focus on core /LE strength , flexibility ex's. .

## 2019-04-17 ENCOUNTER — CLINICAL SUPPORT (OUTPATIENT)
Dept: REHABILITATION | Facility: HOSPITAL | Age: 79
End: 2019-04-17
Attending: PAIN MEDICINE
Payer: MEDICARE

## 2019-04-17 DIAGNOSIS — R53.1 WEAKNESS: ICD-10-CM

## 2019-04-17 PROCEDURE — G8979 MOBILITY GOAL STATUS: HCPCS | Mod: CK,HCNC,PO

## 2019-04-17 PROCEDURE — G8980 MOBILITY D/C STATUS: HCPCS | Mod: CJ,HCNC,PO

## 2019-04-17 PROCEDURE — 97110 THERAPEUTIC EXERCISES: CPT | Mod: HCNC,PO

## 2019-04-17 NOTE — PROGRESS NOTES
Physical Therapy Discharge Summary     Name: Zack Hopper  [facility] Number: [id number]  Diagnosis:   Encounter Diagnosis   Name Primary?    Weakness      Physician: Ricci Lockett Jr.,*  Treatment Orders: Therapeutic exercise  Past Medical History:   Diagnosis Date    DJD (degenerative joint disease)     Nuclear sclerosis - Both Eyes 7/24/2012    Rosacea     Vitamin D deficiency disease        Precautions: standard    Evaluation Date: 1/22/19  Visit # authorized: 19/20  Authorization period: 12/31/19  Plan of care expiration: 4/28/19  Referring Provider: Ricci Lockett Jr.,*     Time: in: 12pm  Time out: 1  Treatment time: 60  1:1 time: 45 minutes    Subjective     Pt reports: since starting her exercises in the morning, dry needling, and changing her mattress she is feeling better. Pt. purchased new footwear that is starting to break in. Denies pain with new footwear.   Pain Scale: before treatment: 3  currently (L) lower back/glute ; after treatment: 0/10    Objective    Lumbar ROM: (measured in degrees)     Degrees Quality   Flexion  WFL        Extension 5     Left Side Bending 25     Right Side Bending 25     Left rotation 75%     Right Rotation 75%        Active/Passive Hip ROM: (measured in degrees)     RLE LLE   ER /35 /35   IR /40 /30      Dermatomes: (impaired/normal)      RLE LLE   L2 Intact Intact   L3 Intact Intact   L4 Intact Intact   L5 Intact Intact   S1 Intact Intact      Reflexes: L3: 2 bilaterally; unable to obtain bilateral S1 reflex     Lower Extremity Strength (graded 0-5 out of 5)    RLE LLE   Hip flexion: 4+/5 5-/5   Hip ER 4+/5 4+/5   Hip IR 5/5 4+/5    Knee extension: 4+/5 4+/5   Ankle dorsiflexion: 5/5 5/5   Great toe extension: 4+/5 4/5   Posterior fibers of Gluteus medius 4-/5 4-/5   Knee flexion 5/5 5/5   Glute max 4+/5 4+/5   Ankle plantarflexion 5/5 5/5   Hip extension: 4+/5 4+/5      Special Tests: ((+): pos.; (-):  "neg.)   · Slump Test: - bilaterally  · SLR Test: 90 bilaterally  · Bridge Test: +  · Pirformis Test: -  · NAKITA left: + SIJ pain  · Scour bilateral hips: -     Flexibility:   · Popliteal Angle: 90 deg. bilateral      Palpation for condition:   · Position: right ilial anterior and left ilial posterior    · Warmth:   · Swelling:   · Texture: hypertonic bilateral: GM, GMin, and piriformis (left greater than right)     Joint Mobility: (graded 0-6 out of 6) L5/S1: 2/6     Therapeutic exercise: Zack received therapeutic exercises to develop strength, endurance, ROM, flexibility and posture for 40 minutes including:     warm-up  · TM: 1.2 mph x8 min.  Supine:   · figure four piriformis stretch bilaterally: 2x30"  · DKTC: 2x30 sec.   · push/pull (right anterior/left ilial posterior): 3x5"  · [place] sport cord lat. pull down with Bridges with BTB: 3 x 15  Sidelying:   · s/l clams with 2#: 3x15  · Reverse clams (With small ball between legs): 3 x 15  Sitting:    Standing:  · TPR with lacrosse ball left piriformis: 3x30 sec.   · walk out Paloff press with OTB 3x12 bilaterally  · monsterwalks with BTB a knees fwd/backwards: 1 laps along the length of the mirror  · lateral walking with BTB at knees along length of mirror (1 laps with knees straight and another lap with mini-squat)  · hip hikes: 2x10 bilaterally  Machines  · sidelying single leg squat 1 black band: 2x10 bilaterally  · Shuttle kick backs with 1 black band 2x10 each    Manual therapy: Zack received the following manual therapy techniques: Joint mobilizations and Soft tissue Mobilization were applied to: piriformis and lumbar spine for 10 minutes.    Manual techniques include:  Soft tissue mobilization:  Zack  received Dry needling with trigger point/manual therapy techniques was performed as per the followin mm left piriformis origin, muscle belly, and insertion; and bilateral QL    Dry needling consent form was reviewed with the patient addressing all " questions and concerns and signed by patient.  Patient also gave verbal consent to undergo dry needling.  Copy of the consent form was not provided to patient as requested by patient.    All needles were removed and changes in signs and symptoms were noted.  Dry needling was performed to decrease inflammation, increase circulation, decrease pain and restore homeostasis.      Joint mobilization:    · lumbar p/a Gr III  · lateral hip distraction left    Written Home Exercises Provided:Patient educated to continue with previously issued HEP to tolerance.  Pt demo good understanding of the education provided. Zack demonstrated good and fair return demonstration of activities.     Pt. education:  · Posture reeducation, body mechanics, HEP, activity modification/avoidance  · No spiritual or educational barriers to learning provided  · Pt has no cultural, educational or language barriers to learning      Assessment   Pt. has made significant progress and feels she can manage her symptoms. Pt. is discharged at this time to continues with HEP.     Anticipated barriers to physical therapy: chronicity of condition    · Pt's spiritual, cultural and educational needs considered and pt agreeable to plan of care and goals as stated below:   GOALS:   Short Term Goals:  4 weeks  1. Report decreased lumbosacral pain </= 5/10 at worst to increase tolerance for prolonged sitting/standing MET 4/17/2019  2. Pt. to demonstrate proper cervical and scapula retraction requiring min. to no verbal cues from PT progressing 4/17/2019  3. Pt. to demonstrate increased MMT for core/lumbar paraspinals to 3/5 to increase endurance with prolonged sitting/standing. MET 4/17/2019  4. Pt. to demonstrate increased MMT for left gluteus medius to 3+/5  to increase stability during community ambulation MET 4/17/2019  5. Pt. to demonstrate increased MMT for left hip flexor to 4/5 to increase ability to clear toes during gait/stair negotiation. MET   6. Pt  to tolerate HEP to improve ROM and independence with ADL's MET  7. Pt. to be independent with symptom management progressing 4/17/2019     Long Term Goals: 8 weeks  1. Report decreased lumbosacral pain </=  2/10 at worst to increase tolerance for prolonged sitting/standing ongoing 4/17/2019  2. Pt. to demonstrate proper cervical and scapula retraction requiring no verbal cues from PT progressing MET   3. Increase thoracic joint mobility to 3-/6 to promote greater ease with self care skills MET 4/17/2019  4. Increase lumbar joint mobility to 3-/6 to promote greater ease with prolonged sitting/standing MET 4/17/2019  5. Pt. to demonstrate increased MMT for core/lumbar paraspinals to 3+/5 to increase endurance with prolonged sitting. MET 4/17/2019  6. Pt. to demonstrate increased MMT for bilateral gluteus medius to 4+/5  to increase stability during ambulation on uneven surfaces. ongoing 4/17/2019  7. Pt. to demonstrate increased MMT for bilateral hip flexor to 4+/5 to increase tolerance for ADL and work activities. MET 4/17/2019  8. Pt to be independent with HEP to improve ROM and independence with ADL's MET 4/17/2019  9. Pt. to be able to resume leisure work out with manageable symptoms MET 4/17/2019     Plan   Discharge with a HEP

## 2019-05-20 NOTE — PROGRESS NOTES
CC: f/up HTN, HLD, aortic atherosclerosis, CKD II  And insomnia    78 y.o. female presents for above   HTN, TX : chlorthalidone 25mg   CKD 2 ( was 3)  Denied HA or dizziness  BP today==>130/72    HLD/aortic atherosclerosis, tx rosuvastatin 10mg  Denied angina or SOB  Denied claudication or LE discoloration  LDL==>74.8 (11/2018)    Insomnia, tx trazodone 50mg  Not as effective as initially, occ awakens in several hrs  Tried taking later but made too groggy next day  Taking sparingly , able to skip some nights    Back pain, s/p CORY were helpful  Sx recurred and pt went to PT and is doing well w/ exercises  Trying to resume walking and has felt sluggish when unable to walk      MEDCARD: Reviewed  ROS:  No fever, chills ,or night sweats  No chest pain or palpitations  No focal deficits or paresthesia  Remainder of review negative except as previously noted    PMHX: Reviewed  SHX: Reviewed  FHX: Reviewed    PE:  VSS:  GEN: WDWN, A&O, NAD, conversant and co-operative; pleasant as always    EYES: Conj/lids unremarkable, sclera anicteric  ENT: Canals w/ some cerumen, right TM - injected , left unremarkable  Nasal mucosa/turbinates, w/o exudate or edema  O/P injected w/o exudate or edema; sinus NT  NECK: Supple w/o LN or TM  RESP: efforts unlabored, lungs CTA  CV: Heart RRR, no MGR, no carotid bruits noted  1+ pedal pulses, no LE edema  MSK: Gait normal. No CCE  NEURO: BALES. No tremor or facial asymmetry  SKIN: Warm and dry    IMPRESSION:  HTN, asx  CKD 2, asx  HLD, asx  Aortic atherosclerosis,asx   (Aorta, ascending- ULN-  US did not reveal an enlargement)  Insomnia  GERD - hoarseness      PLAN:  Continue present meds  Rx update  Resume low salt diet  Resume exercise  Call prn  RTC 6 mos EPP

## 2019-05-21 ENCOUNTER — TELEPHONE (OUTPATIENT)
Dept: INTERNAL MEDICINE | Facility: CLINIC | Age: 79
End: 2019-05-21

## 2019-05-22 ENCOUNTER — OFFICE VISIT (OUTPATIENT)
Dept: INTERNAL MEDICINE | Facility: CLINIC | Age: 79
End: 2019-05-22
Payer: MEDICARE

## 2019-05-22 VITALS
BODY MASS INDEX: 29.49 KG/M2 | SYSTOLIC BLOOD PRESSURE: 130 MMHG | WEIGHT: 166.44 LBS | DIASTOLIC BLOOD PRESSURE: 72 MMHG | HEART RATE: 81 BPM | TEMPERATURE: 98 F | HEIGHT: 63 IN

## 2019-05-22 VITALS
HEART RATE: 100 BPM | SYSTOLIC BLOOD PRESSURE: 130 MMHG | DIASTOLIC BLOOD PRESSURE: 80 MMHG | BODY MASS INDEX: 29.69 KG/M2 | WEIGHT: 167.56 LBS | HEIGHT: 63 IN

## 2019-05-22 DIAGNOSIS — E78.5 HYPERLIPIDEMIA, UNSPECIFIED HYPERLIPIDEMIA TYPE: ICD-10-CM

## 2019-05-22 DIAGNOSIS — I70.0 ATHEROSCLEROSIS OF AORTA: ICD-10-CM

## 2019-05-22 DIAGNOSIS — Z00.00 ENCOUNTER FOR PREVENTIVE HEALTH EXAMINATION: Primary | ICD-10-CM

## 2019-05-22 DIAGNOSIS — G47.00 INSOMNIA, UNSPECIFIED TYPE: ICD-10-CM

## 2019-05-22 DIAGNOSIS — Z23 NEED FOR TETANUS BOOSTER: ICD-10-CM

## 2019-05-22 DIAGNOSIS — I10 ESSENTIAL HYPERTENSION: ICD-10-CM

## 2019-05-22 DIAGNOSIS — K21.9 GASTROESOPHAGEAL REFLUX DISEASE WITHOUT ESOPHAGITIS: ICD-10-CM

## 2019-05-22 DIAGNOSIS — I10 ESSENTIAL HYPERTENSION: Primary | ICD-10-CM

## 2019-05-22 DIAGNOSIS — M51.37 DDD (DEGENERATIVE DISC DISEASE), LUMBOSACRAL: ICD-10-CM

## 2019-05-22 PROBLEM — N18.30 CKD (CHRONIC KIDNEY DISEASE), STAGE III: Status: RESOLVED | Noted: 2018-07-09 | Resolved: 2019-05-22

## 2019-05-22 PROCEDURE — 90714 TD VACC NO PRESV 7 YRS+ IM: CPT | Mod: HCNC,S$GLB,, | Performed by: NURSE PRACTITIONER

## 2019-05-22 PROCEDURE — 3075F PR MOST RECENT SYSTOLIC BLOOD PRESS GE 130-139MM HG: ICD-10-PCS | Mod: HCNC,CPTII,S$GLB, | Performed by: INTERNAL MEDICINE

## 2019-05-22 PROCEDURE — 90471 TD VACCINE GREATER THAN OR EQUAL TO 7YO PRESERVATIVE FREE IM: ICD-10-PCS | Mod: HCNC,S$GLB,, | Performed by: NURSE PRACTITIONER

## 2019-05-22 PROCEDURE — G0439 PR MEDICARE ANNUAL WELLNESS SUBSEQUENT VISIT: ICD-10-PCS | Mod: HCNC,S$GLB,, | Performed by: NURSE PRACTITIONER

## 2019-05-22 PROCEDURE — 3075F SYST BP GE 130 - 139MM HG: CPT | Mod: HCNC,CPTII,S$GLB, | Performed by: NURSE PRACTITIONER

## 2019-05-22 PROCEDURE — G0439 PPPS, SUBSEQ VISIT: HCPCS | Mod: HCNC,S$GLB,, | Performed by: NURSE PRACTITIONER

## 2019-05-22 PROCEDURE — 1101F PT FALLS ASSESS-DOCD LE1/YR: CPT | Mod: HCNC,CPTII,S$GLB, | Performed by: INTERNAL MEDICINE

## 2019-05-22 PROCEDURE — 99999 PR PBB SHADOW E&M-EST. PATIENT-LVL III: CPT | Mod: PBBFAC,HCNC,, | Performed by: INTERNAL MEDICINE

## 2019-05-22 PROCEDURE — 99499 UNLISTED E&M SERVICE: CPT | Mod: HCNC,S$GLB,, | Performed by: NURSE PRACTITIONER

## 2019-05-22 PROCEDURE — 99214 OFFICE O/P EST MOD 30 MIN: CPT | Mod: HCNC,S$GLB,, | Performed by: INTERNAL MEDICINE

## 2019-05-22 PROCEDURE — 99214 PR OFFICE/OUTPT VISIT, EST, LEVL IV, 30-39 MIN: ICD-10-PCS | Mod: HCNC,S$GLB,, | Performed by: INTERNAL MEDICINE

## 2019-05-22 PROCEDURE — 99999 PR PBB SHADOW E&M-EST. PATIENT-LVL III: CPT | Mod: PBBFAC,HCNC,, | Performed by: NURSE PRACTITIONER

## 2019-05-22 PROCEDURE — 3075F PR MOST RECENT SYSTOLIC BLOOD PRESS GE 130-139MM HG: ICD-10-PCS | Mod: HCNC,CPTII,S$GLB, | Performed by: NURSE PRACTITIONER

## 2019-05-22 PROCEDURE — 99999 PR PBB SHADOW E&M-EST. PATIENT-LVL III: ICD-10-PCS | Mod: PBBFAC,HCNC,, | Performed by: NURSE PRACTITIONER

## 2019-05-22 PROCEDURE — 3075F SYST BP GE 130 - 139MM HG: CPT | Mod: HCNC,CPTII,S$GLB, | Performed by: INTERNAL MEDICINE

## 2019-05-22 PROCEDURE — 1101F PR PT FALLS ASSESS DOC 0-1 FALLS W/OUT INJ PAST YR: ICD-10-PCS | Mod: HCNC,CPTII,S$GLB, | Performed by: INTERNAL MEDICINE

## 2019-05-22 PROCEDURE — 90714 TD VACCINE GREATER THAN OR EQUAL TO 7YO PRESERVATIVE FREE IM: ICD-10-PCS | Mod: HCNC,S$GLB,, | Performed by: NURSE PRACTITIONER

## 2019-05-22 PROCEDURE — 3079F DIAST BP 80-89 MM HG: CPT | Mod: HCNC,CPTII,S$GLB, | Performed by: NURSE PRACTITIONER

## 2019-05-22 PROCEDURE — 90471 IMMUNIZATION ADMIN: CPT | Mod: HCNC,S$GLB,, | Performed by: NURSE PRACTITIONER

## 2019-05-22 PROCEDURE — 3078F DIAST BP <80 MM HG: CPT | Mod: HCNC,CPTII,S$GLB, | Performed by: INTERNAL MEDICINE

## 2019-05-22 PROCEDURE — 3079F PR MOST RECENT DIASTOLIC BLOOD PRESSURE 80-89 MM HG: ICD-10-PCS | Mod: HCNC,CPTII,S$GLB, | Performed by: NURSE PRACTITIONER

## 2019-05-22 PROCEDURE — 99499 RISK ADDL DX/OHS AUDIT: ICD-10-PCS | Mod: HCNC,S$GLB,, | Performed by: NURSE PRACTITIONER

## 2019-05-22 PROCEDURE — 3078F PR MOST RECENT DIASTOLIC BLOOD PRESSURE < 80 MM HG: ICD-10-PCS | Mod: HCNC,CPTII,S$GLB, | Performed by: INTERNAL MEDICINE

## 2019-05-22 PROCEDURE — 99999 PR PBB SHADOW E&M-EST. PATIENT-LVL III: ICD-10-PCS | Mod: PBBFAC,HCNC,, | Performed by: INTERNAL MEDICINE

## 2019-05-22 NOTE — PATIENT INSTRUCTIONS
Counseling and Referral of Other Preventative  (Italic type indicates deductible and co-insurance are waived)    Patient Name: Zack Hopper  Today's Date: 5/22/2019    Health Maintenance       Date Due Completion Date    TETANUS VACCINE 02/09/2019 2/9/2009    Override on 2/1/2009: Done    Influenza Vaccine 08/01/2019 11/13/2018    Override on 11/13/2018: Done    Override on 10/27/2017: Done    Override on 11/21/2016: Done    Override on 10/23/2015: Done (10/2015 @ [facility])    DEXA SCAN 11/16/2021 11/16/2018    Override on 4/12/2011: Done    Lipid Panel 11/02/2023 11/2/2018        No orders of the defined types were placed in this encounter.    The following information is provided to all patients.  This information is to help you find resources for any of the problems found today that may be affecting your health:                Living healthy guide: www.[facility].[place].[facility]      Understanding Diabetes: www.diabetes.org      Eating healthy: www.cdc.gov/healthyweight      CDC home safety checklist: www.cdc.gov/steadi/patient.html      Agency on Aging: www.goea.[place].[facility]      Alcoholics anonymous (AA): www.aa.org      Physical Activity: www.remberto.nih.gov/sh8eoip      Tobacco use: www.quitwithusla.org _______________________________________________________________________________      You can photograph, upload, and email your advanced directives to [email or web address] to have them added to your medical record.

## 2019-05-28 ENCOUNTER — TELEPHONE (OUTPATIENT)
Dept: ORTHOPEDICS | Facility: CLINIC | Age: 79
End: 2019-05-28

## 2019-05-28 DIAGNOSIS — M51.36 DDD (DEGENERATIVE DISC DISEASE), LUMBAR: Primary | ICD-10-CM

## 2019-06-12 ENCOUNTER — HOSPITAL ENCOUNTER (OUTPATIENT)
Dept: RADIOLOGY | Facility: HOSPITAL | Age: 79
Discharge: HOME | End: 2019-06-12
Attending: ORTHOPAEDIC SURGERY
Payer: MEDICARE

## 2019-06-12 ENCOUNTER — OFFICE VISIT (OUTPATIENT)
Dept: ORTHOPEDICS | Facility: CLINIC | Age: 79
End: 2019-06-12
Payer: MEDICARE

## 2019-06-12 VITALS — WEIGHT: 166.69 LBS | HEIGHT: 63 IN | BODY MASS INDEX: 29.54 KG/M2

## 2019-06-12 DIAGNOSIS — M43.10 SPONDYLOLISTHESIS, UNSPECIFIED SPINAL REGION: Primary | ICD-10-CM

## 2019-06-12 DIAGNOSIS — M51.36 DDD (DEGENERATIVE DISC DISEASE), LUMBAR: ICD-10-CM

## 2019-06-12 PROCEDURE — 72100 XR LUMBAR SPINE AP AND LAT WITH FLEX/EXT: ICD-10-PCS | Mod: 26,HCNC,, | Performed by: RADIOLOGY

## 2019-06-12 PROCEDURE — 1101F PT FALLS ASSESS-DOCD LE1/YR: CPT | Mod: HCNC,CPTII,S$GLB, | Performed by: PHYSICIAN ASSISTANT

## 2019-06-12 PROCEDURE — 99214 OFFICE O/P EST MOD 30 MIN: CPT | Mod: HCNC,S$GLB,, | Performed by: PHYSICIAN ASSISTANT

## 2019-06-12 PROCEDURE — 99214 PR OFFICE/OUTPT VISIT, EST, LEVL IV, 30-39 MIN: ICD-10-PCS | Mod: HCNC,S$GLB,, | Performed by: PHYSICIAN ASSISTANT

## 2019-06-12 PROCEDURE — 72120 XR LUMBAR SPINE AP AND LAT WITH FLEX/EXT: ICD-10-PCS | Mod: 26,HCNC,, | Performed by: RADIOLOGY

## 2019-06-12 PROCEDURE — 99999 PR PBB SHADOW E&M-EST. PATIENT-LVL III: CPT | Mod: PBBFAC,HCNC,, | Performed by: PHYSICIAN ASSISTANT

## 2019-06-12 PROCEDURE — 1101F PR PT FALLS ASSESS DOC 0-1 FALLS W/OUT INJ PAST YR: ICD-10-PCS | Mod: HCNC,CPTII,S$GLB, | Performed by: PHYSICIAN ASSISTANT

## 2019-06-12 PROCEDURE — 72120 X-RAY BEND ONLY L-S SPINE: CPT | Mod: 26,HCNC,, | Performed by: RADIOLOGY

## 2019-06-12 PROCEDURE — 99999 PR PBB SHADOW E&M-EST. PATIENT-LVL III: ICD-10-PCS | Mod: PBBFAC,HCNC,, | Performed by: PHYSICIAN ASSISTANT

## 2019-06-12 PROCEDURE — 72100 X-RAY EXAM L-S SPINE 2/3 VWS: CPT | Mod: TC,HCNC

## 2019-06-12 PROCEDURE — 72100 X-RAY EXAM L-S SPINE 2/3 VWS: CPT | Mod: 26,HCNC,, | Performed by: RADIOLOGY

## 2019-06-14 NOTE — PROGRESS NOTES
DATE: 2019  PATIENT: Zack Hopper    Supervising Physician: Mikel Maher M.D.    CHIEF COMPLAINT: left sided low back pain    HISTORY:  Zack Hopper is a 78 y.o. female here for initial evaluation of left sided low back pain (Back - 7). The pain has been present for about a year. The patient describes the pain as aching.  The pain is worse with getting up from sitting and walking, and improved by sitting and leaning forward. There is no associated numbness and tingling. There is no subjective weakness. Prior treatments have included ibuprofen, physical therapy and SI joint injections with Dr. Lockett, but no ESIs or surgery.  She reports pretty good relief with SI joint injections that last several months.     The patient denies myelopathic symptoms such as handwriting changes or difficulty with buttons/coins/keys. Denies perineal paresthesias, bowel/bladder dysfunction.    PAST MEDICAL/SURGICAL HISTORY:  Past Medical History:   Diagnosis Date    DJD (degenerative joint disease)     Hyperlipidemia     Hypertension     Nuclear sclerosis - Both Eyes 2012    Rosacea     Vitamin D deficiency disease      Past Surgical History:   Procedure Laterality Date    APPENDECTOMY      BIOPSY-EXCISIONAL  LEFT BREAST WITH WIRE LOCALIZATION  WIRE TO BE INSERTED  AT [place] Left 10/14/2015    [facility] F[facility]    BLOCK, SACROILIAC JOINT- Left - ORAL SEDATION Left 2018    Per[facility] by [facility] Jr[facility] at [facility] PAIN MGT    BLOCK-JOINT-SACROILIAC- left Left 2018    Per[facility] by [facility] Jr[facility] at [facility] PAIN MGT    BLOCK-JOINT-SACROILIAC- Left Left 2017    Performed by [facility] at [facility] PAIN MGT    BREAST BIOPSY Left     2015    BREAST BIOPSY Right     10/2015 ex bx- papilloma     breast biopsy, left      10/2015     SECTION, CLASSIC      Three times    CHOLECYSTECTOMY      COLONOSCOPY N/A  6/23/2015    Performed by Darek Mart[facility]nez MD at [facility] ENDO (4TH FLR)    ESOPHAGOGASTRODUODENOSCOPY (EGD) N/A 9/14/2015    Performed by D[facility]nez MD at [facility] ENDO (4TH FLR)    JOINT REPLACEMENT Right     TKA    SHOULDER OPEN ROTATOR CUFF REPAIR Right     TOTAL KNEE ARTHROPLASTY      right knee       Current Medications:   Current Outpatient Medications:     celecoxib (CELEBREX) 100 MG capsule, Take 1 capsule (100 mg total) by mouth 2 (two) times daily., Disp: 60 capsule, Rfl: 5    chlorthalidone (HYGROTEN) 25 MG Tab, Take 1 tablet (25 mg total) by mouth once daily., Disp: 90 tablet, Rfl: 3    rosuvastatin (CRESTOR) 10 MG tablet, TAKE 1 TABLET BY MOUTH EVERY DAY, Disp: 90 tablet, Rfl: 2    vitamin D 1000 units Tab, Take 1,000 Units by mouth once daily., Disp: , Rfl:     Social History:   Social History     Socioeconomic History    Marital status:      Spouse name: Not on file    Number of children: Not on file    Years of education: Not on file    Highest education level: Not on file   Occupational History    Not on file   Social Needs    Financial resource strain: Not on file    Food insecurity:     Worry: Not on file     Inability: Not on file    Transportation needs:     Medical: Not on file     Non-medical: Not on file   Tobacco Use    Smoking status: Never Smoker    Smokeless tobacco: Never Used   Substance and Sexual Activity    Alcohol use: Yes     Alcohol/week: 0.6 oz     Types: 1 Glasses of wine per week     Comment: approximately 2 glasses of wine weekly    Drug use: No    Sexual activity: Yes     Partners: Male   Lifestyle    Physical activity:     Days per week: Not on file     Minutes per session: Not on file    Stress: Not on file   Relationships    Social connections:     Talks on phone: Not on file     Gets together: Not on file     Attends Gnosticist service: Not on file     Active member of club or organization: Not on file     Attends meetings of clubs or organizations: Not  "on file     Relationship status: Not on file   Other Topics Concern    Not on file   Social History Narrative    Not on file       REVIEW OF SYSTEMS:  Constitution: Negative. Negative for chills, fever and night sweats.   Cardiovascular: Negative for chest pain and syncope.   Respiratory: Negative for cough and shortness of breath.   Gastrointestinal: See HPI. Negative for nausea/vomiting. Negative for abdominal pain.  Genitourinary: See HPI. Negative for discoloration or dysuria.  Skin: Negative for dry skin, itching and rash.   Hematologic/Lymphatic: Negative for bleeding problem. Does not bruise/bleed easily.   Musculoskeletal: Negative for falls and muscle weakness.   Neurological: See HPI. No seizures.   Endocrine: Negative for polydipsia, polyphagia and polyuria.   Allergic/Immunologic: Negative for hives and persistent infections.    PHYSICAL EXAMINATION:    Ht 5' 3" (1.6 m)   Wt 75.6 kg (166 lb 10.7 oz)   BMI 29.52 kg/m²     General: The patient is a very pleasant 78 y.o. female in no apparent distress, the patient is oriented to person, place and time.   Psych: Normal mood and affect  HEENT: Vision grossly intact, hearing intact to the spoken word.  Lungs: Respirations unlabored.  Gait: Normal station and gait, no difficulty with toe or heel walk.   Skin: Dorsal lumbar skin negative for rashes, lesions, hairy patches and surgical scars.  Range of motion: Lumbar range of motion is acceptable. There is minimal lumbar tenderness to palpation.  Spinal Balance: Global saggital and coronal spinal balance acceptable, no significant for scoliosis and kyphosis.  Musculoskeletal: No pain with the range of motion of the bilateral hips. No trochanteric tenderness to palpation.  Vascular: Bilateral lower extremities warm and well perfused, Dorsalis pedis pulses 2+ bilaterally.  Neurological: Normal strength and tone in all major motor groups in the bilateral lower extremities. Normal sensation to light touch in the " L2-S1 dermatomes bilaterally.  Deep tendon reflexes symmetric 2+ in the bilateral lower extremities.  Negative Babinski bilaterally.  Straight leg raise negative bilaterally.     IMAGING:   Today I personally reviewed AP, Lat and Flex/Ex upright L-spine films that demonstrate grade I anterolisthesis of L4/5.      Body mass index is 29.52 kg/m².    No results found for: HGBA1C      ASSESSMENT/PLAN:    Diagnoses and all orders for this visit:    Spondylolisthesis, unspecified spinal region  -     MRI Lumbar Spine Without Contrast; Future      Patient seen and examined with Dr. Maher.  MRI lumbar spine.  Follow up after the MRI to discuss results and further treatment including ESIs.  The patient is not interested in surgical intervention at this time.     Follow up if symptoms worsen or fail to improve.

## 2019-06-24 ENCOUNTER — OFFICE VISIT (OUTPATIENT)
Dept: ORTHOPEDICS | Facility: CLINIC | Age: 79
End: 2019-06-24
Payer: MEDICARE

## 2019-06-24 ENCOUNTER — HOSPITAL ENCOUNTER (OUTPATIENT)
Dept: RADIOLOGY | Facility: HOSPITAL | Age: 79
Discharge: HOME | End: 2019-06-24
Attending: PHYSICIAN ASSISTANT
Payer: MEDICARE

## 2019-06-24 VITALS — WEIGHT: 165.56 LBS | BODY MASS INDEX: 29.34 KG/M2 | HEIGHT: 63 IN

## 2019-06-24 DIAGNOSIS — M43.10 DEGENERATIVE SPONDYLOLISTHESIS: Primary | ICD-10-CM

## 2019-06-24 DIAGNOSIS — M43.10 SPONDYLOLISTHESIS, UNSPECIFIED SPINAL REGION: ICD-10-CM

## 2019-06-24 PROCEDURE — 1101F PT FALLS ASSESS-DOCD LE1/YR: CPT | Mod: HCNC,CPTII,S$GLB, | Performed by: ORTHOPAEDIC SURGERY

## 2019-06-24 PROCEDURE — 99213 PR OFFICE/OUTPT VISIT, EST, LEVL III, 20-29 MIN: ICD-10-PCS | Mod: HCNC,S$GLB,, | Performed by: ORTHOPAEDIC SURGERY

## 2019-06-24 PROCEDURE — 72148 MRI LUMBAR SPINE W/O DYE: CPT | Mod: TC,HCNC

## 2019-06-24 PROCEDURE — 99213 OFFICE O/P EST LOW 20 MIN: CPT | Mod: HCNC,S$GLB,, | Performed by: ORTHOPAEDIC SURGERY

## 2019-06-24 PROCEDURE — 72148 MRI LUMBAR SPINE WITHOUT CONTRAST: ICD-10-PCS | Mod: 26,HCNC,, | Performed by: RADIOLOGY

## 2019-06-24 PROCEDURE — 99999 PR PBB SHADOW E&M-EST. PATIENT-LVL III: ICD-10-PCS | Mod: PBBFAC,HCNC,, | Performed by: ORTHOPAEDIC SURGERY

## 2019-06-24 PROCEDURE — 99999 PR PBB SHADOW E&M-EST. PATIENT-LVL III: CPT | Mod: PBBFAC,HCNC,, | Performed by: ORTHOPAEDIC SURGERY

## 2019-06-24 PROCEDURE — 72148 MRI LUMBAR SPINE W/O DYE: CPT | Mod: 26,HCNC,, | Performed by: RADIOLOGY

## 2019-06-24 PROCEDURE — 1101F PR PT FALLS ASSESS DOC 0-1 FALLS W/OUT INJ PAST YR: ICD-10-PCS | Mod: HCNC,CPTII,S$GLB, | Performed by: ORTHOPAEDIC SURGERY

## 2019-06-28 NOTE — PROGRESS NOTES
The patient returns for follow-up.  She has left-sided low back pain and a known L4-5 degenerative spondylolisthesis.  She had a recent MRI that does not demonstrate any severe central spinal stenosis.    She has had equivocal results with physical therapy but has had benefit with a sacroiliac joint injection.    On examination there are no focal motor sensory deficits.    Today reviewed lumbar spine imaging that demonstrates diffuse spondylosis with a L4-5 grade 1 degenerative spondylolisthesis without associated stenosis.    There discussed options, I recommended home physical therapy and walking program.  If she continues to have refractory back pain we can consider bilateral L4-5 facet injections with Dr. Bedolla.    I spent 15 minutes with the patient of which greater than 1/2 the time was devoted to counciling the patient regarding treatment options.

## 2019-07-11 ENCOUNTER — TELEPHONE (OUTPATIENT)
Dept: ORTHOPEDICS | Facility: CLINIC | Age: 79
End: 2019-07-11

## 2019-07-11 NOTE — TELEPHONE ENCOUNTER
Returned call to patient but had to leave a message on voice mail requesting that she call me back so I may address her concerns.

## 2019-07-11 NOTE — TELEPHONE ENCOUNTER
----- Message from Katya Amaya sent at 7/11/2019 12:40 PM CDT -----  Contact: Self   Calling in regards to an injection with pain management and Dr Bedolla.     [phone]

## 2019-07-22 ENCOUNTER — TELEPHONE (OUTPATIENT)
Dept: PAIN MEDICINE | Facility: CLINIC | Age: 79
End: 2019-07-22

## 2019-07-22 ENCOUNTER — PATIENT MESSAGE (OUTPATIENT)
Dept: ORTHOPEDICS | Facility: CLINIC | Age: 79
End: 2019-07-22

## 2019-07-22 DIAGNOSIS — M43.16 SPONDYLOLISTHESIS OF LUMBAR REGION: Primary | ICD-10-CM

## 2019-07-23 ENCOUNTER — TELEPHONE (OUTPATIENT)
Dept: PAIN MEDICINE | Facility: CLINIC | Age: 79
End: 2019-07-23

## 2019-07-23 DIAGNOSIS — M53.3 SACROILIAC JOINT PAIN: Primary | ICD-10-CM

## 2019-07-29 NOTE — DISCHARGE INSTRUCTIONS
Home Care Instructions Pain Management:    1.  DIET:    You may resume your normal diet today.    2.  BATHING:    You may shower with luke warm water.    3.  DRESSING:    You may remove your bandage today.    4.  ACTIVITY LEVEL:      You may resume your normal activities 24 hours after your procedure.    5.  MEDICATIONS:    You may resume your normal medications today.    6.  SPECIAL INSTRUCTIONS:    No heat to the injection site for 24 hours including bath or shower, heating pad, moist heat or hot tubs.    Use an ice pack to the injection site for any pain or discomfort.  Apply ice packs for 20 minute intervals as needed.    If you have received any sedatives by mouth today, you can not drive for 12 hours.    If you have received sedation through an IV, you can not drive for 24 hours.    PLEASE CALL YOUR DOCTOR FOR THE FOLLOWIN.  Redness or swelling around the injection site.  2.  Fever of 101 degrees.  3.  Drainage (pus) from the injection site.  4.  For any continuous bleeding (some dried blood over the incision is normal.)    FOR EMERGENCIES:    If any unusual problems or difficulties occur during clinic hours, call [phone] or dial [phone].    Follow up with with your physician in 2-3 weeks.

## 2019-07-30 ENCOUNTER — HOSPITAL ENCOUNTER (OUTPATIENT)
Facility: HOSPITAL | Age: 79
Discharge: HOME | End: 2019-07-30
Attending: PAIN MEDICINE | Admitting: PAIN MEDICINE
Payer: MEDICARE

## 2019-07-30 VITALS
OXYGEN SATURATION: 98 % | DIASTOLIC BLOOD PRESSURE: 75 MMHG | HEIGHT: 63 IN | WEIGHT: 163 LBS | HEART RATE: 79 BPM | TEMPERATURE: 98 F | RESPIRATION RATE: 17 BRPM | SYSTOLIC BLOOD PRESSURE: 157 MMHG | BODY MASS INDEX: 28.88 KG/M2

## 2019-07-30 DIAGNOSIS — M53.3 SI (SACROILIAC) JOINT DYSFUNCTION: Primary | ICD-10-CM

## 2019-07-30 DIAGNOSIS — G89.29 CHRONIC PAIN: ICD-10-CM

## 2019-07-30 PROCEDURE — 27096 PR INJECTION,SACROILIAC JOINT: ICD-10-PCS | Mod: HCNC,LT,, | Performed by: PAIN MEDICINE

## 2019-07-30 PROCEDURE — 25000003 PHARM REV CODE 250: Mod: HCNC | Performed by: PAIN MEDICINE

## 2019-07-30 PROCEDURE — 27096 INJECT SACROILIAC JOINT: CPT | Mod: HCNC | Performed by: PAIN MEDICINE

## 2019-07-30 PROCEDURE — 27096 INJECT SACROILIAC JOINT: CPT | Mod: HCNC,LT,, | Performed by: PAIN MEDICINE

## 2019-07-30 RX ORDER — ALPRAZOLAM 0.25 MG/1
0.5 TABLET, ORALLY DISINTEGRATING ORAL ONCE AS NEEDED
Status: COMPLETED | OUTPATIENT
Start: 2019-07-30 | End: 2019-07-30

## 2019-07-30 RX ADMIN — ALPRAZOLAM 0.5 MG: 0.5 TABLET, ORALLY DISINTEGRATING ORAL at 01:07

## 2019-07-30 NOTE — H&P
[facility]  History & Physical - Short Stay  Pain Management           SUBJECTIVE:     Procedure: Procedure(s) (LRB):  BLOCK, SACROILIAC JOINT---Left W/Oral Sedation (Left)    Chief Complaint/Reason for Admission:  Sacroiliac joint pain [M53.3]    PTA Medications   Medication Sig    celecoxib (CELEBREX) 100 MG capsule Take 1 capsule (100 mg total) by mouth 2 (two) times daily.    chlorthalidone (HYGROTEN) 25 MG Tab Take 1 tablet (25 mg total) by mouth once daily.    rosuvastatin (CRESTOR) 10 MG tablet TAKE 1 TABLET BY MOUTH EVERY DAY    vitamin D 1000 units Tab Take 1,000 Units by mouth once daily.       Review of patient's allergies indicates:   Allergen Reactions    Sulfacetamide      Other reaction(s): Unknown       Past Medical History:   Diagnosis Date    DJD (degenerative joint disease)     Hyperlipidemia     Hypertension     Nuclear sclerosis - Both Eyes 2012    Rosacea     Vitamin D deficiency disease      Past Surgical History:   Procedure Laterality Date    APPENDECTOMY      BIOPSY-EXCISIONAL  LEFT BREAST WITH WIRE LOCALIZATION  [facility]    [facility] 2ND FLR    BLOCK, SACROILIAC JOINT- Left - ORAL SEDATION Left 2018    [facility] at [facility] PAIN MGT    BLOCK-JOINT-SACROILIAC- left Left 2018    Performed by Ricci [facility] Jr[facility] MD at [facility] PAIN MGT    BLOCK-JOINT-SACROILIAC- Left Left 2017    Per[facility] by [facility] at [facility] PAIN MGT    BREAST BIOPSY Left     2015    BREAST BIOPSY Right     10/2015 ex bx- papilloma     breast biopsy, left      10/2015     SECTION, CLASSIC      Three times    CHOLECYSTECTOMY      COLONOSCOPY N/A 2015    Performed by D[facility]nez MD at [facility] ENDO (4TH FLR)    ESOPHAGOGASTRODUODENOSCOPY (EGD) N/A 2015    Performed by [facility] at [facility] ENDO (4TH FLR)    JOINT REPLACEMENT Right      TKA    SHOULDER OPEN ROTATOR CUFF REPAIR Right     TOTAL KNEE ARTHROPLASTY      right knee     Family History   Problem Relation Age of Onset    Diabetes Maternal Grandfather     Hypertension Mother     Stroke Father     Heart disease Father     Cataracts Sister     Hypertension Sister     Migraines Sister     Diabetes Brother         d. 50's in MVA (s/p BKA)    Breast cancer Sister 77        stage 4     Kidney failure Brother         d.65    Kidney disease Brother         ESRD/Dialysis    Other Brother         d.34.s/p appendectomy    No Known Problems Son     No Known Problems Son     No Known Problems Son     Rectal cancer Neg Hx     Esophageal cancer Neg Hx     Colon cancer Neg Hx     Stomach cancer Neg Hx      Social History     Tobacco Use    Smoking status: Never Smoker    Smokeless tobacco: Never Used   Substance Use Topics    Alcohol use: Yes     Alcohol/week: 0.6 oz     Types: 1 Glasses of wine per week     Comment: approximately 2 glasses of wine weekly    Drug use: No        Review of Systems:  Review of Systems   Constitutional: Negative for chills and fever.   HENT: Negative for nosebleeds.    Eyes: Negative for blurred vision.   Respiratory: Negative for hemoptysis.    Cardiovascular: Negative for chest pain and palpitations.   Gastrointestinal: Negative for heartburn and vomiting.   Genitourinary: Negative for hematuria.   Musculoskeletal: Positive for back pain. Negative for myalgias.   Skin: Negative for rash.   Neurological: Negative for seizures and loss of consciousness.   Endo/Heme/Allergies: Does not bruise/bleed easily.   Psychiatric/Behavioral: Negative for hallucinations.       OBJECTIVE:     Vital Signs (Most Recent):  Temp: 97.9 °F (36.6 °C) (07/30/19 1308)  Pulse: 73 (07/30/19 1308)  Resp: 16 (07/30/19 1308)  BP: (!) 163/74 (07/30/19 1309)  SpO2: 99 % (07/30/19 1308)    Physical Exam:  General appearance - alert, well appearing, and in no distress  Mental status  - AOx3  Eyes - pupils equal and reactive, extraocular eye movements intact  Heart - normal rate, regular rhythm, normal S1, S2, no murmurs, rubs, clicks or gallops  Chest - clear to auscultation, no wheezes, rales or rhonchi, symmetric air entry  Abdomen - soft, nontender, nondistended, no masses or organomegaly  Neurological - alert, oriented, normal speech, no focal findings or movement disorder noted  Extremities - peripheral pulses normal, no pedal edema, no clubbing or cyanosis  Back - ++NAKITA on left side      ASSESSMENT/PLAN:     Active Hospital Problems    Diagnosis  POA    Chronic pain [G89.29]  Yes      Resolved Hospital Problems   No resolved problems to display.        The risks and benefits of this intervention, and alternative therapies were discussed with the patient.  The discussion of risks included infection, bleeding, need for additional procedures or surgery, nerve damage, paralysis, adverse medication reaction(s), stroke, and/or death.  Questions regarding the procedure, risks, expected outcome, and possible side effects were solicited and answered to the patient's satisfaction.  Zack wishes to proceed with the injection.  Verbal and written consent were verified.      Proceed with intervention as scheduled.      Ricci Lockett Jr, MD  Interventional Pain Medicine / Anesthesiology

## 2019-07-30 NOTE — DISCHARGE SUMMARY
[facility]  Discharge Note  Short Stay     Admit Date: 7/30/2019    Discharge Date: 7/30/2019     Attending Physician: Ricci Lockett Jr, MD    Diagnoses:  Active Hospital Problems    Diagnosis  POA    *SI (sacroiliac) joint dysfunction [M53.3]  Yes    Chronic pain [G89.29]  Yes      Resolved Hospital Problems   No resolved problems to display.     Discharged Condition: Good     Hospital Course: Patient was admitted for an outpatient interventional pain management procedure and tolerated the procedure well with no complications.     Final Diagnoses: Same as principal problem.     Disposition: Home or Self Care     Follow up/Patient Instructions:        Reconciled Medications:     Medication List      CONTINUE taking these medications    celecoxib 100 MG capsule  Commonly known as:  CeleBREX  Take 1 capsule (100 mg total) by mouth 2 (two) times daily.     chlorthalidone 25 MG Tab  Commonly known as:  HYGROTEN  Take 1 tablet (25 mg total) by mouth once daily.     rosuvastatin 10 MG tablet  Commonly known as:  CRESTOR  TAKE 1 TABLET BY MOUTH EVERY DAY     vitamin D 1000 units Tab  Commonly known as:  VITAMIN D3  Take 1,000 Units by mouth once daily.           Discharge Procedure Orders (must include Diet, Follow-up, Activity)   Call MD for:  temperature >100.4     Call MD for:  severe uncontrolled pain     Call MD for:  redness, tenderness, or signs of infection (pain, swelling, redness, odor or green/yellow discharge around incision site)     Call MD for:  difficulty breathing or increased cough     Call MD for:  severe persistent headache     Call MD for:  worsening rash     Remove dressing in 24 hours       Ricci Lockett Jr, MD  Interventional Pain Medicine / Anesthesiology

## 2019-07-30 NOTE — OP NOTE
Procedure Note    Pre-operative Diagnosis: Sacroiliac Joint Dysfunction  Post-operative Diagnosis: Sacroiliac Joint Dysfunction  Procedure Date: 07/30/2019      Procedure:  (1) LEFT Sacroiliac Joint Injection     (2) Intraoperative fluoroscopy      Indications: (1) To alleviate pain and suffering, (2) reduce functional impairment, and for (3) diagnostic purposes.    The patients history and physical exam were reviewed. The risks (local discomfort, infection, headache, temporary or permanent weakness and/or numbness of one or both legs, temporary or permanent paraplegia, heart attack and stroke), benefits and alternatives to the procedure were discussed, and all questions were answered to the patients satisfaction. The patient agreed to proceed, and written, informed consent was verified.    Procedure in Detail: Patient was brought back to procedure room and placed in a prone position and head resting comfortably in head ring. Prior to the initiation of the procedure, the patient's identity, the site, and the nature of the procedure were verified.     The skin was prepped with chloroprep and sterile drapes were applied. The Left SI joint was identified by fluoroscopy in an oblique plane. Skin and subcutaneous tissues overyling the target area was anesthetized with 1-2 mL of Lidocaine 1%.  A 22g 3 1/2 inch spinal needle was advanced under intermittent fluoroscopy until joint space was entered. There was no paresthesia with needle placement. Aspiration was negative for blood. Omnipaque 0.5 mL was injected confirming appropriate position and spread into the joint with local accumulation and no evidence of direct vascular uptake. Next, 2 mL containing Depomedrol 40 mg (1 mL) and Bupivacaine 0.5% (1 mL) was injected without difficulty or resistance.  The spinal needle was removed with lidocaine flush and bandaged placed over site of needle insertion.      EBL: nil    Disposition: The patient tolerated the procedure  well, and there were no apparent complications. Vital signs remained stable throughout the procedure. The patient was taken to the recovery area where written discharge instructions for the procedure were given.     Follow-up: RTC as scheduled      Ricci Lockett Jr, MD  Interventional Pain Medicine / Anesthesiology

## 2019-09-11 ENCOUNTER — IMMUNIZATION (OUTPATIENT)
Dept: PHARMACY | Facility: CLINIC | Age: 79
End: 2019-09-11
Payer: MEDICARE

## 2019-09-12 ENCOUNTER — TELEPHONE (OUTPATIENT)
Dept: INTERNAL MEDICINE | Facility: CLINIC | Age: 79
End: 2019-09-12

## 2019-09-12 DIAGNOSIS — I10 HYPERTENSION, UNSPECIFIED TYPE: ICD-10-CM

## 2019-09-12 DIAGNOSIS — E78.5 HYPERLIPIDEMIA, UNSPECIFIED HYPERLIPIDEMIA TYPE: Primary | ICD-10-CM

## 2019-09-12 DIAGNOSIS — Z12.31 VISIT FOR SCREENING MAMMOGRAM: ICD-10-CM

## 2019-09-12 DIAGNOSIS — E55.9 VITAMIN D DEFICIENCY: ICD-10-CM

## 2019-09-12 NOTE — TELEPHONE ENCOUNTER
Labs ordered and added.  Screening mammogram ordered.    Spoke to pt. Mammogram scheduled for 9/16/19.

## 2019-09-16 ENCOUNTER — HOSPITAL ENCOUNTER (OUTPATIENT)
Dept: RADIOLOGY | Facility: HOSPITAL | Age: 79
Discharge: HOME | End: 2019-09-16
Attending: INTERNAL MEDICINE
Payer: MEDICARE

## 2019-09-16 DIAGNOSIS — Z12.31 VISIT FOR SCREENING MAMMOGRAM: ICD-10-CM

## 2019-09-16 PROCEDURE — 77067 MAMMO DIGITAL SCREENING BILAT WITH TOMOSYNTHESIS_CAD: ICD-10-PCS | Mod: 26,HCNC,, | Performed by: RADIOLOGY

## 2019-09-16 PROCEDURE — 77067 SCR MAMMO BI INCL CAD: CPT | Mod: TC,HCNC,PO

## 2019-09-16 PROCEDURE — 77063 MAMMO DIGITAL SCREENING BILAT WITH TOMOSYNTHESIS_CAD: ICD-10-PCS | Mod: 26,HCNC,, | Performed by: RADIOLOGY

## 2019-09-16 PROCEDURE — 77063 BREAST TOMOSYNTHESIS BI: CPT | Mod: 26,HCNC,, | Performed by: RADIOLOGY

## 2019-09-16 PROCEDURE — 77067 SCR MAMMO BI INCL CAD: CPT | Mod: 26,HCNC,, | Performed by: RADIOLOGY

## 2019-09-17 ENCOUNTER — TELEPHONE (OUTPATIENT)
Dept: RADIOLOGY | Facility: HOSPITAL | Age: 79
End: 2019-09-17

## 2019-09-17 NOTE — TELEPHONE ENCOUNTER
Spoke with patient and explained mammogram findings.Patient expressed understanding of results. Patient scheduled abnormal mammogram follow up appointment at [facility] on 9/20/2019.

## 2019-09-20 ENCOUNTER — HOSPITAL ENCOUNTER (OUTPATIENT)
Dept: RADIOLOGY | Facility: HOSPITAL | Age: 79
Discharge: HOME | End: 2019-09-20
Attending: INTERNAL MEDICINE
Payer: MEDICARE

## 2019-09-20 DIAGNOSIS — R92.8 ABNORMAL MAMMOGRAM: ICD-10-CM

## 2019-09-20 PROCEDURE — 76642 ULTRASOUND BREAST LIMITED: CPT | Mod: 26,HCNC,RT, | Performed by: RADIOLOGY

## 2019-09-20 PROCEDURE — 77065 DX MAMMO INCL CAD UNI: CPT | Mod: TC,HCNC,PO

## 2019-09-20 PROCEDURE — 77061 BREAST TOMOSYNTHESIS UNI: CPT | Mod: 26,HCNC,, | Performed by: RADIOLOGY

## 2019-09-20 PROCEDURE — 77061 BREAST TOMOSYNTHESIS UNI: CPT | Mod: TC,HCNC,PO

## 2019-09-20 PROCEDURE — 77065 MAMMO DIGITAL DIAGNOSTIC RIGHT WITH TOMOSYNTHESIS_CAD: ICD-10-PCS | Mod: 26,HCNC,, | Performed by: RADIOLOGY

## 2019-09-20 PROCEDURE — 77061 MAMMO DIGITAL DIAGNOSTIC RIGHT WITH TOMOSYNTHESIS_CAD: ICD-10-PCS | Mod: 26,HCNC,, | Performed by: RADIOLOGY

## 2019-09-20 PROCEDURE — 77065 DX MAMMO INCL CAD UNI: CPT | Mod: 26,HCNC,, | Performed by: RADIOLOGY

## 2019-09-20 PROCEDURE — 76642 US BREAST RIGHT LIMITED: ICD-10-PCS | Mod: 26,HCNC,RT, | Performed by: RADIOLOGY

## 2019-09-20 PROCEDURE — 76642 ULTRASOUND BREAST LIMITED: CPT | Mod: TC,HCNC,PO,RT

## 2019-09-23 ENCOUNTER — TELEPHONE (OUTPATIENT)
Dept: RADIOLOGY | Facility: HOSPITAL | Age: 79
End: 2019-09-23

## 2019-09-23 NOTE — TELEPHONE ENCOUNTER
Spoke with patient. Reviewed breast biopsy procedure and reviewed instructions for breast biopsy. Patient expressed understanding and all questions were answered. Provided patient with my phone number to call for any further concerns or questions.   Patient scheduled breast biopsy at the [facility] on 9/27/2019.

## 2019-09-27 ENCOUNTER — HOSPITAL ENCOUNTER (OUTPATIENT)
Dept: RADIOLOGY | Facility: HOSPITAL | Age: 79
Discharge: HOME | End: 2019-09-27
Attending: INTERNAL MEDICINE
Payer: MEDICARE

## 2019-09-27 DIAGNOSIS — R92.8 ABNORMAL MAMMOGRAM: ICD-10-CM

## 2019-09-27 PROCEDURE — 77065 DX MAMMO INCL CAD UNI: CPT | Mod: 26,HCNC,RT, | Performed by: RADIOLOGY

## 2019-09-27 PROCEDURE — 77065 DX MAMMO INCL CAD UNI: CPT | Mod: TC,HCNC,PO,RT

## 2019-09-27 PROCEDURE — 77065 MAMMO DIGITAL DIAGNOSTIC RIGHT WITH CAD: ICD-10-PCS | Mod: 26,HCNC,RT, | Performed by: RADIOLOGY

## 2020-01-10 ENCOUNTER — LAB VISIT (OUTPATIENT)
Dept: LAB | Facility: HOSPITAL | Age: 80
End: 2020-01-10
Attending: INTERNAL MEDICINE
Payer: MEDICARE

## 2020-01-10 DIAGNOSIS — E55.9 VITAMIN D DEFICIENCY: ICD-10-CM

## 2020-01-10 DIAGNOSIS — E78.5 HYPERLIPIDEMIA, UNSPECIFIED HYPERLIPIDEMIA TYPE: ICD-10-CM

## 2020-01-10 DIAGNOSIS — I10 HYPERTENSION, UNSPECIFIED TYPE: ICD-10-CM

## 2020-01-10 LAB
25(OH)D3+25(OH)D2 SERPL-MCNC: 22 NG/ML (ref 30–96)
ALBUMIN SERPL BCP-MCNC: 4.1 G/DL (ref 3.5–5.2)
ALP SERPL-CCNC: 62 U/L (ref 55–135)
ALT SERPL W/O P-5'-P-CCNC: 20 U/L (ref 10–44)
ANION GAP SERPL CALC-SCNC: 8 MMOL/L (ref 8–16)
AST SERPL-CCNC: 18 U/L (ref 10–40)
BASOPHILS # BLD AUTO: 0.06 K/UL (ref 0–0.2)
BASOPHILS NFR BLD: 1 % (ref 0–1.9)
BILIRUB SERPL-MCNC: 1.1 MG/DL (ref 0.1–1)
BUN SERPL-MCNC: 14 MG/DL (ref 8–23)
CALCIUM SERPL-MCNC: 9.8 MG/DL (ref 8.7–10.5)
CHLORIDE SERPL-SCNC: 104 MMOL/L (ref 95–110)
CHOLEST SERPL-MCNC: 216 MG/DL (ref 120–199)
CHOLEST/HDLC SERPL: 4.3 {RATIO} (ref 2–5)
CO2 SERPL-SCNC: 28 MMOL/L (ref 23–29)
CREAT SERPL-MCNC: 1 MG/DL (ref 0.5–1.4)
DIFFERENTIAL METHOD: ABNORMAL
EOSINOPHIL # BLD AUTO: 0.2 K/UL (ref 0–0.5)
EOSINOPHIL NFR BLD: 4 % (ref 0–8)
ERYTHROCYTE [DISTWIDTH] IN BLOOD BY AUTOMATED COUNT: 13 % (ref 11.5–14.5)
EST. GFR  (AFRICAN AMERICAN): >60 ML/MIN/1.73 M^2
EST. GFR  (NON AFRICAN AMERICAN): 54.5 ML/MIN/1.73 M^2
GLUCOSE SERPL-MCNC: 106 MG/DL (ref 70–110)
HCT VFR BLD AUTO: 43.2 % (ref 37–48.5)
HDLC SERPL-MCNC: 50 MG/DL (ref 40–75)
HDLC SERPL: 23.1 % (ref 20–50)
HGB BLD-MCNC: 13.4 G/DL (ref 12–16)
IMM GRANULOCYTES # BLD AUTO: 0.01 K/UL (ref 0–0.04)
IMM GRANULOCYTES NFR BLD AUTO: 0.2 % (ref 0–0.5)
LDLC SERPL CALC-MCNC: 130.4 MG/DL (ref 63–159)
LYMPHOCYTES # BLD AUTO: 1.8 K/UL (ref 1–4.8)
LYMPHOCYTES NFR BLD: 30.3 % (ref 18–48)
MCH RBC QN AUTO: 30.5 PG (ref 27–31)
MCHC RBC AUTO-ENTMCNC: 31 G/DL (ref 32–36)
MCV RBC AUTO: 98 FL (ref 82–98)
MONOCYTES # BLD AUTO: 0.5 K/UL (ref 0.3–1)
MONOCYTES NFR BLD: 9.3 % (ref 4–15)
NEUTROPHILS # BLD AUTO: 3.2 K/UL (ref 1.8–7.7)
NEUTROPHILS NFR BLD: 55.2 % (ref 38–73)
NONHDLC SERPL-MCNC: 166 MG/DL
NRBC BLD-RTO: 0 /100 WBC
PLATELET # BLD AUTO: 236 K/UL (ref 150–350)
PMV BLD AUTO: 11 FL (ref 9.2–12.9)
POTASSIUM SERPL-SCNC: 4.8 MMOL/L (ref 3.5–5.1)
PROT SERPL-MCNC: 7.3 G/DL (ref 6–8.4)
RBC # BLD AUTO: 4.4 M/UL (ref 4–5.4)
SODIUM SERPL-SCNC: 140 MMOL/L (ref 136–145)
TRIGL SERPL-MCNC: 178 MG/DL (ref 30–150)
TSH SERPL DL<=0.005 MIU/L-ACNC: 2.18 UIU/ML (ref 0.4–4)
WBC # BLD AUTO: 5.78 K/UL (ref 3.9–12.7)

## 2020-01-10 PROCEDURE — 36415 COLL VENOUS BLD VENIPUNCTURE: CPT | Mod: HCNC,PO

## 2020-01-10 PROCEDURE — 85025 COMPLETE CBC W/AUTO DIFF WBC: CPT | Mod: HCNC

## 2020-01-10 PROCEDURE — 80061 LIPID PANEL: CPT | Mod: HCNC

## 2020-01-10 PROCEDURE — 84443 ASSAY THYROID STIM HORMONE: CPT | Mod: HCNC

## 2020-01-10 PROCEDURE — 82306 VITAMIN D 25 HYDROXY: CPT | Mod: HCNC

## 2020-01-10 PROCEDURE — 80053 COMPREHEN METABOLIC PANEL: CPT | Mod: HCNC

## 2020-01-12 PROBLEM — I77.89 ASCENDING AORTA ENLARGEMENT: Status: ACTIVE | Noted: 2020-01-12

## 2020-01-12 NOTE — PROGRESS NOTES
CC: Annual PE    79 y.o. female presents for PE  Non smoker  Social ETOH    HEALTH MAINTENANCE:  Cholesterol/labs: reviewed  C-scope: 6/2015-----------REC 5 yrs  One 3 mm polyp in the ascending colon. Resected   and retrieved. Tubular adenoma  - Melanosis in the colon.  - The examination was otherwise normal on direct   and retroflexion views.  EGD: 9/2015- occ dysphagia  Impression: - LA Grade B reflux esophagitis.  - Small hiatus hernia.  - Non-bleeding erosive gastropathy. Biopsied.  - Normal examined duodenum.  WWE: aged out, asx  Mammo: 2017, s/p bx- benign  DEXA: 9/2015, normal- update pending  AAA evaluation: 11/2018- Negative  EYE exam: UTD  DDS exam: UTD    VACCINATIONS:  TD: 2/2009, 2019  Flu: yearly  Pneumovax: 8/2014,  Prevnar: 10/2015, 7/2016  Zostavax: 3/21/2011  Shingrix; completed 11/2018    MEDCARD: Reviewed  ROS:  No fever, chills, or night sweats  No visual disturbance or d/c  No ear or sinus pain or pressure  No dysphagia or early satiety  No chest pain or palpitations  No cough or wheezing  No PND or orthopnea  No GERD or abdominal pain  No change in bowels or blood in stool  No hematuria or dysuria;   No vaginal bleeding or pelvic pain or bloating  No skin rashes or lesions  No joint swelling or erythema  No unusual HA or focal deficits  No cold or heat intolerance  No increased thirst or urination  No unusual bruising or bleeding  ++back pain worse in AM, was on celecoxib  Wondering if she could re-start, never tried Tylenol Arthritis that would be safer for her kidneys- request she try for 2-4 wks and monitor progress, would consider Celebrex if has breakthrough and would use sparingly  ADL's: 100% independent  Memory: Good, delayed recall  Mental health: moved to smaller house, and worries about grandson  Advance Directives:+ will, need living will, and M/POA forms for AD given, pt completed and will return  Nutrtion: Good, off diet for Holidays  Gait: No falls,2019  Safety: Intact, just had  incident w/ [employer]  Impersonators- no problems past year  Urinary incontinence: n/a  Remainder of review negative except as previously noted    PMHX:Reviewed  PSHX: Reviewed  SHX: Reviewed      PE:  VS: As noted  GENERAL: Well developed well nourished, alert and oriented in NAD  Conversant and co-operative  EYES: Conjunctiva and lids normal, sclera anicteric, PERRL, EOMI  ENT: Hearing intact; canals free of cerumen ,   TM's unremarkable  Nasal mucosa/turbinates/septum unremarkable;   oropharynx w/o lesions or stridor  Sinus nontender  NECK: Supple w/o thyromegaly or lymphadenopathy  RESPIRATORY: Efforts are unlabored; lungs are CTAP  CARDIOVASCULAR: Heart RRR w/o mumur, gallop or rub; No carotid bruits noted  1+ pedal pulses; no LE edema noted  GASTROINTESTINAL: Bowel sounds are present, soft, nontender, nondistended  No hepatosplenomegaly noted.  MUSCULOSKELETAL: Gait normal. No clubbing, cyanosis, or edema noted.  NEUROLOGIC: BLAES. No tremor noted  SKIN: Warm and dry    IMPRESSION:  Annual PE  FHx: Breast cancer  HTN, stable  CKD , III, asx  Hypercholesterolemia,asx   Aortic atherosclerosis, asx  Ascending aorta- ULN 3.7cm-  (AAA scan  11/2018- WNL)  Insomnia, chronic  IFBS, remote  Inflammatory spondylopathy L-spine    PLAN:   Pneumovax  Exercise  Avoid salt  Resume BP med  Monitor BP  Continue present meds  Rx update  Vitamin D supplementation  Tylenol Arthritis as reviewed above  Call prn  RTC 6 mos

## 2020-01-13 ENCOUNTER — TELEPHONE (OUTPATIENT)
Dept: INTERNAL MEDICINE | Facility: CLINIC | Age: 80
End: 2020-01-13

## 2020-01-13 NOTE — TELEPHONE ENCOUNTER
----- Message from Rosemarie Brooks MD sent at 1/11/2020  8:31 AM CST -----  Please review your lab work and we will discuss at your pending office visit.  Rosemarie Perez

## 2020-01-17 ENCOUNTER — OFFICE VISIT (OUTPATIENT)
Dept: INTERNAL MEDICINE | Facility: CLINIC | Age: 80
End: 2020-01-17
Payer: MEDICARE

## 2020-01-17 VITALS
TEMPERATURE: 99 F | RESPIRATION RATE: 18 BRPM | HEART RATE: 80 BPM | WEIGHT: 163.81 LBS | DIASTOLIC BLOOD PRESSURE: 72 MMHG | BODY MASS INDEX: 29.02 KG/M2 | HEIGHT: 63 IN | SYSTOLIC BLOOD PRESSURE: 110 MMHG

## 2020-01-17 DIAGNOSIS — Z00.00 ANNUAL PHYSICAL EXAM: Primary | ICD-10-CM

## 2020-01-17 DIAGNOSIS — R73.01 IMPAIRED FASTING GLUCOSE: ICD-10-CM

## 2020-01-17 DIAGNOSIS — I10 HYPERTENSION, UNSPECIFIED TYPE: ICD-10-CM

## 2020-01-17 DIAGNOSIS — N18.30 CKD (CHRONIC KIDNEY DISEASE) STAGE 3, GFR 30-59 ML/MIN: ICD-10-CM

## 2020-01-17 DIAGNOSIS — E78.5 HYPERLIPIDEMIA, UNSPECIFIED HYPERLIPIDEMIA TYPE: ICD-10-CM

## 2020-01-17 DIAGNOSIS — Z23 NEED FOR 23-POLYVALENT PNEUMOCOCCAL POLYSACCHARIDE VACCINE: ICD-10-CM

## 2020-01-17 DIAGNOSIS — G47.00 INSOMNIA, UNSPECIFIED TYPE: ICD-10-CM

## 2020-01-17 DIAGNOSIS — I77.89 ASCENDING AORTA ENLARGEMENT: ICD-10-CM

## 2020-01-17 DIAGNOSIS — I70.0 AORTIC ATHEROSCLEROSIS: ICD-10-CM

## 2020-01-17 DIAGNOSIS — M46.96 UNSPECIFIED INFLAMMATORY SPONDYLOPATHY, LUMBAR REGION: ICD-10-CM

## 2020-01-17 PROCEDURE — 3078F DIAST BP <80 MM HG: CPT | Mod: HCNC,CPTII,S$GLB, | Performed by: INTERNAL MEDICINE

## 2020-01-17 PROCEDURE — 99499 UNLISTED E&M SERVICE: CPT | Mod: HCNC,S$GLB,, | Performed by: INTERNAL MEDICINE

## 2020-01-17 PROCEDURE — 99499 RISK ADDL DX/OHS AUDIT: ICD-10-PCS | Mod: HCNC,S$GLB,, | Performed by: INTERNAL MEDICINE

## 2020-01-17 PROCEDURE — 99999 PR PBB SHADOW E&M-EST. PATIENT-LVL III: ICD-10-PCS | Mod: PBBFAC,HCNC,, | Performed by: INTERNAL MEDICINE

## 2020-01-17 PROCEDURE — 99397 PR PREVENTIVE VISIT,EST,65 & OVER: ICD-10-PCS | Mod: 25,HCNC,S$GLB, | Performed by: INTERNAL MEDICINE

## 2020-01-17 PROCEDURE — G0009 ADMIN PNEUMOCOCCAL VACCINE: HCPCS | Mod: HCNC,S$GLB,, | Performed by: INTERNAL MEDICINE

## 2020-01-17 PROCEDURE — 90732 PNEUMOCOCCAL POLYSACCHARIDE VACCINE 23-VALENT =>2YO SQ IM: ICD-10-PCS | Mod: HCNC,S$GLB,, | Performed by: INTERNAL MEDICINE

## 2020-01-17 PROCEDURE — 3074F SYST BP LT 130 MM HG: CPT | Mod: HCNC,CPTII,S$GLB, | Performed by: INTERNAL MEDICINE

## 2020-01-17 PROCEDURE — 3078F PR MOST RECENT DIASTOLIC BLOOD PRESSURE < 80 MM HG: ICD-10-PCS | Mod: HCNC,CPTII,S$GLB, | Performed by: INTERNAL MEDICINE

## 2020-01-17 PROCEDURE — G0009 PNEUMOCOCCAL POLYSACCHARIDE VACCINE 23-VALENT =>2YO SQ IM: ICD-10-PCS | Mod: HCNC,S$GLB,, | Performed by: INTERNAL MEDICINE

## 2020-01-17 PROCEDURE — 90732 PPSV23 VACC 2 YRS+ SUBQ/IM: CPT | Mod: HCNC,S$GLB,, | Performed by: INTERNAL MEDICINE

## 2020-01-17 PROCEDURE — 3074F PR MOST RECENT SYSTOLIC BLOOD PRESSURE < 130 MM HG: ICD-10-PCS | Mod: HCNC,CPTII,S$GLB, | Performed by: INTERNAL MEDICINE

## 2020-01-17 PROCEDURE — 99999 PR PBB SHADOW E&M-EST. PATIENT-LVL III: CPT | Mod: PBBFAC,HCNC,, | Performed by: INTERNAL MEDICINE

## 2020-01-17 PROCEDURE — 99397 PER PM REEVAL EST PAT 65+ YR: CPT | Mod: 25,HCNC,S$GLB, | Performed by: INTERNAL MEDICINE

## 2020-01-17 RX ORDER — ROSUVASTATIN CALCIUM 10 MG/1
10 TABLET, COATED ORAL DAILY
Qty: 90 TABLET | Refills: 3 | Status: SHIPPED | OUTPATIENT
Start: 2020-01-17 | End: 2020-07-20 | Stop reason: SDUPTHER

## 2020-01-17 RX ORDER — CHLORTHALIDONE 25 MG/1
25 TABLET ORAL DAILY
Qty: 90 TABLET | Refills: 3 | Status: SHIPPED | OUTPATIENT
Start: 2020-01-17 | End: 2021-01-25 | Stop reason: SDUPTHER

## 2020-01-17 RX ORDER — DEXTROMETHORPHAN HYDROBROMIDE, GUAIFENESIN 5; 100 MG/5ML; MG/5ML
650 LIQUID ORAL EVERY 8 HOURS
Refills: 0
Start: 2020-01-17 | End: 2023-03-27 | Stop reason: HOSPADM

## 2020-01-21 ENCOUNTER — HOSPITAL ENCOUNTER (OUTPATIENT)
Dept: RADIOLOGY | Facility: HOSPITAL | Age: 80
Discharge: HOME | End: 2020-01-21
Attending: INTERNAL MEDICINE
Payer: MEDICARE

## 2020-01-21 DIAGNOSIS — R92.8 ABNORMAL MAMMOGRAM: ICD-10-CM

## 2020-01-21 PROCEDURE — 77061 BREAST TOMOSYNTHESIS UNI: CPT | Mod: 26,HCNC,, | Performed by: RADIOLOGY

## 2020-01-21 PROCEDURE — 77065 MAMMO DIGITAL DIAGNOSTIC RIGHT WITH TOMOSYNTHESIS_CAD: ICD-10-PCS | Mod: 26,HCNC,, | Performed by: RADIOLOGY

## 2020-01-21 PROCEDURE — 77065 DX MAMMO INCL CAD UNI: CPT | Mod: 26,HCNC,, | Performed by: RADIOLOGY

## 2020-01-21 PROCEDURE — 77061 MAMMO DIGITAL DIAGNOSTIC RIGHT WITH TOMOSYNTHESIS_CAD: ICD-10-PCS | Mod: 26,HCNC,, | Performed by: RADIOLOGY

## 2020-01-21 PROCEDURE — 77065 DX MAMMO INCL CAD UNI: CPT | Mod: TC,HCNC,PO

## 2020-01-22 ENCOUNTER — TELEPHONE (OUTPATIENT)
Dept: INTERNAL MEDICINE | Facility: CLINIC | Age: 80
End: 2020-01-22

## 2020-01-22 NOTE — TELEPHONE ENCOUNTER
----- Message from Rosemarie Brooks MD sent at 1/21/2020  7:42 PM CST -----  Please note that your mammogram was read as follows  Impression:  There is no mammographic evidence of malignancy.  Rosemarie Perez

## 2020-02-10 ENCOUNTER — TELEPHONE (OUTPATIENT)
Dept: OPHTHALMOLOGY | Facility: CLINIC | Age: 80
End: 2020-02-10

## 2020-02-10 NOTE — TELEPHONE ENCOUNTER
----- Message from Shruthi Majano sent at 2/10/2020 12:50 PM CST -----  Contact: Pt  Pt would like to set up an appt.    Pt# [phone]

## 2020-03-25 ENCOUNTER — TELEPHONE (OUTPATIENT)
Dept: OPHTHALMOLOGY | Facility: CLINIC | Age: 80
End: 2020-03-25

## 2020-05-12 ENCOUNTER — PES CALL (OUTPATIENT)
Dept: ADMINISTRATIVE | Facility: CLINIC | Age: 80
End: 2020-05-12

## 2020-05-18 ENCOUNTER — PES CALL (OUTPATIENT)
Dept: ADMINISTRATIVE | Facility: CLINIC | Age: 80
End: 2020-05-18

## 2020-05-27 ENCOUNTER — OFFICE VISIT (OUTPATIENT)
Dept: OPHTHALMOLOGY | Facility: CLINIC | Age: 80
End: 2020-05-27
Payer: MEDICARE

## 2020-05-27 DIAGNOSIS — H25.13 NUCLEAR SCLEROSIS, BILATERAL: Primary | ICD-10-CM

## 2020-05-27 PROCEDURE — 99999 PR PBB SHADOW E&M-EST. PATIENT-LVL II: ICD-10-PCS | Mod: PBBFAC,HCNC,, | Performed by: OPHTHALMOLOGY

## 2020-05-27 PROCEDURE — 99999 PR PBB SHADOW E&M-EST. PATIENT-LVL II: CPT | Mod: PBBFAC,HCNC,, | Performed by: OPHTHALMOLOGY

## 2020-05-27 PROCEDURE — 92014 COMPRE OPH EXAM EST PT 1/>: CPT | Mod: HCNC,S$GLB,, | Performed by: OPHTHALMOLOGY

## 2020-05-27 PROCEDURE — 92014 PR EYE EXAM, EST PATIENT,COMPREHESV: ICD-10-PCS | Mod: HCNC,S$GLB,, | Performed by: OPHTHALMOLOGY

## 2020-05-27 NOTE — PROGRESS NOTES
HPI     80 YO female presents today for a cataract re-check. She notes that   vision seems to be about the same since her visit last year, she buys OTC   glasses.     Refresh OU PRN     Last edited by Monserrat Jay, PCT on 5/27/2020  1:07 PM. (History)            Assessment /Plan     For exam results, see Encounter Report.    Nuclear sclerosis, bilateral      Incipient cataract: Patient does reports mild visual decline, but not sufficient to affect activities of daily living. I recommend monitoring visual status and follow up when visual symptoms worsen.

## 2020-05-27 NOTE — LETTER
May 27, 2020      Parker Hathaway, OD  [address]  [facility] [address]           [facility]  [address]  [facility] [address]  Phone: [phone]  Fax: [phone]          Patient: Zack Hopper   MR Number: [id number]   YOB: 1940   Date of Visit: 5/27/2020       Dear Dr. Parker Hathaway:    Thank you for referring Zack Hopper to me for evaluation. Attached you will find relevant portions of my assessment and plan of care.    If you have questions, please do not hesitate to call me. I look forward to following Zack Hopper along with you.    Sincerely,    Bridgette Lopez MD    Enclosure  CC:  No Recipients    If you would like to receive this communication electronically, please contact [email or web address] or [phone] to request more information on [employer] access.    For providers and/or their staff who would like to refer a patient to [facility], please contact us through our one-stop-shop provider referral line, [facility], at [phone].    If you feel you have received this communication in error or would no longer like to receive these types of communications, please e-mail [email or web address]

## 2020-07-13 ENCOUNTER — TELEPHONE (OUTPATIENT)
Dept: INTERNAL MEDICINE | Facility: CLINIC | Age: 80
End: 2020-07-13

## 2020-07-13 ENCOUNTER — LAB VISIT (OUTPATIENT)
Dept: LAB | Facility: HOSPITAL | Age: 80
End: 2020-07-13
Attending: INTERNAL MEDICINE
Payer: MEDICARE

## 2020-07-13 DIAGNOSIS — R73.01 IMPAIRED FASTING GLUCOSE: ICD-10-CM

## 2020-07-13 DIAGNOSIS — E78.5 HYPERLIPIDEMIA, UNSPECIFIED HYPERLIPIDEMIA TYPE: ICD-10-CM

## 2020-07-13 LAB
ALBUMIN SERPL BCP-MCNC: 4 G/DL (ref 3.5–5.2)
ALP SERPL-CCNC: 63 U/L (ref 55–135)
ALT SERPL W/O P-5'-P-CCNC: 15 U/L (ref 10–44)
ANION GAP SERPL CALC-SCNC: 7 MMOL/L (ref 8–16)
AST SERPL-CCNC: 14 U/L (ref 10–40)
BILIRUB SERPL-MCNC: 0.8 MG/DL (ref 0.1–1)
BUN SERPL-MCNC: 15 MG/DL (ref 8–23)
CALCIUM SERPL-MCNC: 9.6 MG/DL (ref 8.7–10.5)
CHLORIDE SERPL-SCNC: 105 MMOL/L (ref 95–110)
CHOLEST SERPL-MCNC: 231 MG/DL (ref 120–199)
CHOLEST/HDLC SERPL: 4.7 {RATIO} (ref 2–5)
CO2 SERPL-SCNC: 27 MMOL/L (ref 23–29)
CREAT SERPL-MCNC: 0.9 MG/DL (ref 0.5–1.4)
EST. GFR  (AFRICAN AMERICAN): >60 ML/MIN/1.73 M^2
EST. GFR  (NON AFRICAN AMERICAN): >60 ML/MIN/1.73 M^2
ESTIMATED AVG GLUCOSE: 114 MG/DL (ref 68–131)
GLUCOSE SERPL-MCNC: 108 MG/DL (ref 70–110)
HBA1C MFR BLD HPLC: 5.6 % (ref 4–5.6)
HDLC SERPL-MCNC: 49 MG/DL (ref 40–75)
HDLC SERPL: 21.2 % (ref 20–50)
LDLC SERPL CALC-MCNC: 149.6 MG/DL (ref 63–159)
NONHDLC SERPL-MCNC: 182 MG/DL
POTASSIUM SERPL-SCNC: 4.5 MMOL/L (ref 3.5–5.1)
PROT SERPL-MCNC: 7.2 G/DL (ref 6–8.4)
SODIUM SERPL-SCNC: 139 MMOL/L (ref 136–145)
TRIGL SERPL-MCNC: 162 MG/DL (ref 30–150)

## 2020-07-13 PROCEDURE — 36415 COLL VENOUS BLD VENIPUNCTURE: CPT | Mod: HCNC,PO

## 2020-07-13 PROCEDURE — 80061 LIPID PANEL: CPT | Mod: HCNC

## 2020-07-13 PROCEDURE — 83036 HEMOGLOBIN GLYCOSYLATED A1C: CPT | Mod: HCNC

## 2020-07-13 PROCEDURE — 80053 COMPREHEN METABOLIC PANEL: CPT | Mod: HCNC

## 2020-07-13 NOTE — TELEPHONE ENCOUNTER
----- Message from Rosemarie Brooks MD sent at 7/13/2020  4:21 PM CDT -----  Please review your lab work and we will discuss at your pending office visit.  Rosemarie Perez

## 2020-07-19 NOTE — PROGRESS NOTES
CC: f/up HTN, HLD, aortic atherosclerosis, CKD II  And insomnia    80 y.o. female presents for above   HTN, TX : chlorthalidone 25mg   CKD 2 ( was 3)  Denied HA or dizziness  BP today==>132/80    HLD/aortic atherosclerosis, tx rosuvastatin 10mg  - needs Rx udpate--  Denied angina or SOB  Denied claudication or LE discoloration  LDL==>149.6    Insomnia, tx tried Sleep Aid from [employer] that is helping, but sister has done really well w/ trazodone 100mg      Back pain, s/p CORY were helpful  Sx recurred and pt went to PT and is doing well w/ exercises  Trying to resume walking and has felt sluggish when unable to walk      MEDCARD: Reviewed  ROS:  No fever, chills ,or night sweats  No chest pain or palpitations  No focal deficits or paresthesia  Remainder of review negative except as previously noted    PMHX: Reviewed  SHX: Reviewed  FHX: Reviewed    PE:  VSS:  GEN: WDWN, A&O, NAD, conversant and co-operative; pleasant as always    EYES: Conj/lids unremarkable, sclera anicteric  NECK: Supple w/o LN or TM  RESP: efforts unlabored, lungs CTA  CV: Heart RRR, no MGR, no carotid bruits noted  1+ pedal pulses, no LE edema  MSK: Gait normal. No CCE  NEURO: BALES. No tremor or facial asymmetry  SKIN: Warm and dry    IMPRESSION:  HTN, stable  CKD 2, asx  HLD, asx, LDL not @ goal, update Rx statin  Aortic atherosclerosis,asx   (Aorta, ascending- ULN-  US did not reveal an enlargement)  Insomnia, stable      PLAN:  Mammo  C-scope  Continue present meds  Rx update  Trazodone 100mg prn  Resume low salt diet  Resume exercise  Call prn  RTC 6 mos EPP

## 2020-07-20 ENCOUNTER — OFFICE VISIT (OUTPATIENT)
Dept: INTERNAL MEDICINE | Facility: CLINIC | Age: 80
End: 2020-07-20
Payer: MEDICARE

## 2020-07-20 VITALS
TEMPERATURE: 98 F | HEART RATE: 86 BPM | HEIGHT: 63 IN | DIASTOLIC BLOOD PRESSURE: 80 MMHG | WEIGHT: 167.75 LBS | SYSTOLIC BLOOD PRESSURE: 132 MMHG | BODY MASS INDEX: 29.72 KG/M2

## 2020-07-20 DIAGNOSIS — I70.0 AORTIC ATHEROSCLEROSIS: ICD-10-CM

## 2020-07-20 DIAGNOSIS — E78.5 HYPERLIPIDEMIA, UNSPECIFIED HYPERLIPIDEMIA TYPE: ICD-10-CM

## 2020-07-20 DIAGNOSIS — Z12.11 COLON CANCER SCREENING: ICD-10-CM

## 2020-07-20 DIAGNOSIS — I10 HYPERTENSION, UNSPECIFIED TYPE: Primary | ICD-10-CM

## 2020-07-20 DIAGNOSIS — N18.2 CKD (CHRONIC KIDNEY DISEASE) STAGE 2, GFR 60-89 ML/MIN: ICD-10-CM

## 2020-07-20 DIAGNOSIS — K21.9 GASTROESOPHAGEAL REFLUX DISEASE WITHOUT ESOPHAGITIS: ICD-10-CM

## 2020-07-20 DIAGNOSIS — G47.00 INSOMNIA, UNSPECIFIED TYPE: ICD-10-CM

## 2020-07-20 DIAGNOSIS — Z12.31 ENCOUNTER FOR SCREENING MAMMOGRAM FOR BREAST CANCER: ICD-10-CM

## 2020-07-20 PROCEDURE — 3079F PR MOST RECENT DIASTOLIC BLOOD PRESSURE 80-89 MM HG: ICD-10-PCS | Mod: HCNC,CPTII,S$GLB, | Performed by: INTERNAL MEDICINE

## 2020-07-20 PROCEDURE — 99214 PR OFFICE/OUTPT VISIT, EST, LEVL IV, 30-39 MIN: ICD-10-PCS | Mod: HCNC,S$GLB,, | Performed by: INTERNAL MEDICINE

## 2020-07-20 PROCEDURE — 1101F PT FALLS ASSESS-DOCD LE1/YR: CPT | Mod: HCNC,CPTII,S$GLB, | Performed by: INTERNAL MEDICINE

## 2020-07-20 PROCEDURE — 99214 OFFICE O/P EST MOD 30 MIN: CPT | Mod: HCNC,S$GLB,, | Performed by: INTERNAL MEDICINE

## 2020-07-20 PROCEDURE — 3075F PR MOST RECENT SYSTOLIC BLOOD PRESS GE 130-139MM HG: ICD-10-PCS | Mod: HCNC,CPTII,S$GLB, | Performed by: INTERNAL MEDICINE

## 2020-07-20 PROCEDURE — 1159F MED LIST DOCD IN RCRD: CPT | Mod: HCNC,S$GLB,, | Performed by: INTERNAL MEDICINE

## 2020-07-20 PROCEDURE — 3079F DIAST BP 80-89 MM HG: CPT | Mod: HCNC,CPTII,S$GLB, | Performed by: INTERNAL MEDICINE

## 2020-07-20 PROCEDURE — 3075F SYST BP GE 130 - 139MM HG: CPT | Mod: HCNC,CPTII,S$GLB, | Performed by: INTERNAL MEDICINE

## 2020-07-20 PROCEDURE — 1126F AMNT PAIN NOTED NONE PRSNT: CPT | Mod: HCNC,S$GLB,, | Performed by: INTERNAL MEDICINE

## 2020-07-20 PROCEDURE — 99999 PR PBB SHADOW E&M-EST. PATIENT-LVL IV: ICD-10-PCS | Mod: PBBFAC,HCNC,, | Performed by: INTERNAL MEDICINE

## 2020-07-20 PROCEDURE — 99999 PR PBB SHADOW E&M-EST. PATIENT-LVL IV: CPT | Mod: PBBFAC,HCNC,, | Performed by: INTERNAL MEDICINE

## 2020-07-20 PROCEDURE — 1159F PR MEDICATION LIST DOCUMENTED IN MEDICAL RECORD: ICD-10-PCS | Mod: HCNC,S$GLB,, | Performed by: INTERNAL MEDICINE

## 2020-07-20 PROCEDURE — 1126F PR PAIN SEVERITY QUANTIFIED, NO PAIN PRESENT: ICD-10-PCS | Mod: HCNC,S$GLB,, | Performed by: INTERNAL MEDICINE

## 2020-07-20 PROCEDURE — 1101F PR PT FALLS ASSESS DOC 0-1 FALLS W/OUT INJ PAST YR: ICD-10-PCS | Mod: HCNC,CPTII,S$GLB, | Performed by: INTERNAL MEDICINE

## 2020-07-20 RX ORDER — ROSUVASTATIN CALCIUM 10 MG/1
10 TABLET, COATED ORAL DAILY
Qty: 90 TABLET | Refills: 3 | Status: SHIPPED | OUTPATIENT
Start: 2020-07-20 | End: 2021-09-23

## 2020-07-20 RX ORDER — TRAZODONE HYDROCHLORIDE 100 MG/1
100 TABLET ORAL NIGHTLY
Qty: 30 TABLET | Refills: 2 | Status: SHIPPED | OUTPATIENT
Start: 2020-07-20 | End: 2021-01-25

## 2020-07-24 ENCOUNTER — OFFICE VISIT (OUTPATIENT)
Dept: URGENT CARE | Facility: CLINIC | Age: 80
End: 2020-07-24
Payer: MEDICARE

## 2020-07-24 VITALS
OXYGEN SATURATION: 97 % | HEIGHT: 67 IN | RESPIRATION RATE: 16 BRPM | WEIGHT: 167 LBS | TEMPERATURE: 97 F | DIASTOLIC BLOOD PRESSURE: 77 MMHG | HEART RATE: 101 BPM | SYSTOLIC BLOOD PRESSURE: 121 MMHG | BODY MASS INDEX: 26.21 KG/M2

## 2020-07-24 DIAGNOSIS — Z11.59 SCREENING FOR VIRAL DISEASE: Primary | ICD-10-CM

## 2020-07-24 DIAGNOSIS — R50.9 FEVER, UNSPECIFIED FEVER CAUSE: ICD-10-CM

## 2020-07-24 PROCEDURE — U0003 INFECTIOUS AGENT DETECTION BY NUCLEIC ACID (DNA OR RNA); SEVERE ACUTE RESPIRATORY SYNDROME CORONAVIRUS 2 (SARS-COV-2) (CORONAVIRUS DISEASE [COVID-19]), AMPLIFIED PROBE TECHNIQUE, MAKING USE OF HIGH THROUGHPUT TECHNOLOGIES AS DESCRIBED BY CMS-2020-01-R: HCPCS | Mod: HCNC

## 2020-07-24 PROCEDURE — 99213 PR OFFICE/OUTPT VISIT, EST, LEVL III, 20-29 MIN: ICD-10-PCS | Mod: S$GLB,,, | Performed by: NURSE PRACTITIONER

## 2020-07-24 PROCEDURE — 99213 OFFICE O/P EST LOW 20 MIN: CPT | Mod: S$GLB,,, | Performed by: NURSE PRACTITIONER

## 2020-07-24 NOTE — PATIENT INSTRUCTIONS
Instructions for Patients with Confirmed or Suspected COVID-19    If you are awaiting your test result, you will either be called or it will be released to the patient portal.  If you have any questions about your test, please visit [email or web address] or call our COVID-19 information line at [phone].      Instructions for non-hospitalized or discharged patients with confirmed or suspected COVID-19:       Stay home except to get medical care.    Separate yourself from other people and animals in your home.    Call ahead before visiting your doctor.    Wear a face mask.    Cover your coughs and sneezes.    Clean your hands often.    Avoid sharing personal household items.    Clean all high-touch surfaces every day.    Monitor your symptoms. Seek prompt medical attention if your illness is worsening (e.g., difficulty breathing). Before seeking care, call your healthcare provider.    If you have a medical emergency and must call 911, notify the dispatcher that you have or are being evaluated for COVID-19. If possible, put on a face mask before emergency medical services arrive.    Use the following symptom-based strategy to return to normal activity following a suspected or confirmed case of COVID-19. Continue isolation until:   o At least 3 days (72 hours) have passed since recovery defined as resolution of fever without the use of fever-reducing medications and improvement in respiratory symptoms (e.g. cough, shortness of breath), and   o At least 10 days have passed since the first positive test.       As one of the next steps, you will receive a call or text from the [place] Department of Health ([facility]) COVID-19 Tracing Team. See the contact information below so you know not to ignore the health departments call. It is important that you contact them back immediately so they can help.     Contact Tracer Number:  [phone]  Caller ID for most carriers: [place] Dept [facility]    What is  contact tracing?   Contact tracing is a process that helps identify everyone who has been in close contact with an infected person. Contact tracers let those people know they may have been exposed and guide them on next steps. Confidentiality is important for everyone; no one will be told who may have exposed them to the virus.   Your involvement is important. The more we know about where and how this virus is spreading, the better chance we have at stopping it from spreading further.  What does exposure mean?   Exposure means you have been within 6 feet for more than 15 minutes with a person who has or had COVID-19.  What kind of questions do the contact tracers ask?   A contact tracer will confirm your basic contact information including name, address, phone number, and next of kin, as well as asking about any symptoms you may have had. Theyll also ask you how you think you may have gotten sick, such as places where you may have been exposed to the virus, and people you were with. Those names will never be shared with anyone outside of that call, and will only be used to help trace and stop the spread of the virus.   I have privacy concerns. How will the state use my information?   Your privacy about your health is important. All calls are completed using call centers that use the appropriate health privacy protection measures (HIPAA compliance), meaning that your patient information is safe. No one will ever ask you any questions related to immigration status. Your health comes first.   Do I have to participate?   You do not have to participate, but we strongly encourage you to. Contact tracing can help us catch and control new outbreaks as theyre developing to keep your friends and family safe.   What if I dont hear from anyone?   If you dont receive a call within 24 hours, you can call the number above right away to inquire about your status. That line is open from 8:00 am - 8:00 p.m., 7 days a  week.  Contact tracing saves lives! Together, we have the power to beat this virus and keep our loved ones and neighbors safe.       Instructions for household members, intimate partners and caregivers in a non-healthcare setting of a patient with confirmed or suspected COVID-19:         Close contacts should monitor their health and call their healthcare provider right away if they develop symptoms suggestive of COVID-19 (e.g., fever, cough, shortness of breath).    Stay home except to get medical care. Separate yourself from other people and animals in the home.   Monitor the patients symptoms. If the patient is getting sicker, call his or her healthcare provider. If the patient has a medical emergency and you need to call 911, notify the dispatch personnel that the patient has or is being evaluated for COVID-19.    Wear a facemask when around other people such as sharing a room or vehicle and before entering a healthcare provider's office.   Cover coughs and sneezes with a tissue. Throw used tissues in a lined trash can immediately and wash hands.   Clean hands often with soap and water for at least 20 seconds or with an alcohol-based hand , rubbing hands together until they feel dry. Avoid touching your eyes, nose, and mouth with unwashed hands.   Clean all high-touch; surfaces every day, including counters, tabletops, doorknobs, bathroom fixtures, toilets, phones, keyboards, tablets, bedside tables, etc. Use a household cleaning spray or wipe according to label instructions.   Avoid sharing personal household items such as dishes, drinking glasses, cups, towels, bedding, etc. After these items are used, they should be washed thoroughly with soap and water.   Continue isolation until:   At least 3 days (72 hours) have passed since recovery defined as resolution of fever without the use of fever-reducing medications and improvement in respiratory symptoms (e.g. cough, shortness of breath),  and    At least 10 days have passed since the patients first positive test.    https://www.cdc.gov/coronavirus/2019-ncov/your-health/index.htm

## 2020-07-24 NOTE — PROGRESS NOTES
"Subjective:       Patient ID: Zack Hopper is a 80 y.o. female.    Vitals:  height is 5' 7" (1.702 m) and weight is 75.8 kg (167 lb). Her temperature is 97.3 °F (36.3 °C). Her blood pressure is 121/77 and her pulse is 101. Her respiration is 16 and oxygen saturation is 97%.     Chief Complaint: Generalized Body Aches    Pt states yesterday onset with subjective fever and body aches.     Provider note begins below:    Fever (100.7F) and chills last night that resolved without medication. No cough or SOB. No GI related symptoms, including, N/v/D or constipation. No anosmia or ageusia.    No known sick contact      Other  This is a new problem. The current episode started yesterday. Associated symptoms include a fever and myalgias. Pertinent negatives include no arthralgias, chest pain, chills, congestion, coughing, fatigue, headaches, joint swelling, nausea, rash, sore throat, vertigo, vomiting or weakness. Nothing aggravates the symptoms. She has tried nothing for the symptoms.       Constitution: Positive for fever. Negative for chills and fatigue.   HENT: Negative for congestion and sore throat.    Neck: Negative for painful lymph nodes.   Cardiovascular: Negative for chest pain and leg swelling.   Eyes: Negative for double vision and blurred vision.   Respiratory: Negative for cough and shortness of breath.    Gastrointestinal: Negative for nausea, vomiting and diarrhea.   Genitourinary: Negative for dysuria, frequency, urgency and history of kidney stones.   Musculoskeletal: Positive for muscle ache. Negative for joint pain, joint swelling and muscle cramps.   Skin: Negative for color change, pale, rash and bruising.   Allergic/Immunologic: Negative for seasonal allergies.   Neurological: Negative for dizziness, history of vertigo, light-headedness, passing out and headaches.   Hematologic/Lymphatic: Negative for swollen lymph nodes.   Psychiatric/Behavioral: Negative for nervous/anxious, sleep " disturbance and depression. The patient is not nervous/anxious.        Objective:      Physical Exam   Constitutional: She is oriented to person, place, and time. She appears well-developed. She is cooperative.  Non-toxic appearance. She does not appear ill. No distress.   HENT:   Head: Normocephalic and atraumatic.   Ears:   Right Ear: Hearing and external ear normal.   Left Ear: Hearing and external ear normal.   Nose: Nose normal. No mucosal edema or rhinorrhea.   Mouth/Throat: Mucous membranes are normal.   Eyes: Conjunctivae and lids are normal. No scleral icterus.   Neck: Trachea normal, full passive range of motion without pain and phonation normal. Neck supple. No neck rigidity. No edema and no erythema present.   Cardiovascular: Normal rate, regular rhythm, normal heart sounds and normal pulses.   Pulmonary/Chest: Effort normal and breath sounds normal. No stridor. No respiratory distress. She has no decreased breath sounds. She has no wheezes. She has no rhonchi. She has no rales.   Abdominal: Normal appearance.   Musculoskeletal: Normal range of motion.         General: No deformity.   Neurological: She is alert and oriented to person, place, and time. She exhibits normal muscle tone. Coordination normal.   Skin: Skin is warm, dry, intact, not diaphoretic and not pale. Psychiatric: Her speech is normal and behavior is normal. Judgment and thought content normal.   Nursing note and vitals reviewed.        Assessment:       1. Screening for viral disease    2. Fever, unspecified fever cause        Plan:         Screening for viral disease    Fever, unspecified fever cause      Patient Instructions   Instructions for Patients with Confirmed or Suspected COVID-19    If you are awaiting your test result, you will either be called or it will be released to the patient portal.  If you have any questions about your test, please visit [email or web address] or call our COVID-19 information line at  [phone].      Instructions for non-hospitalized or discharged patients with confirmed or suspected COVID-19:       Stay home except to get medical care.    Separate yourself from other people and animals in your home.    Call ahead before visiting your doctor.    Wear a face mask.    Cover your coughs and sneezes.    Clean your hands often.    Avoid sharing personal household items.    Clean all high-touch surfaces every day.    Monitor your symptoms. Seek prompt medical attention if your illness is worsening (e.g., difficulty breathing). Before seeking care, call your healthcare provider.    If you have a medical emergency and must call 911, notify the dispatcher that you have or are being evaluated for COVID-19. If possible, put on a face mask before emergency medical services arrive.    Use the following symptom-based strategy to return to normal activity following a suspected or confirmed case of COVID-19. Continue isolation until:   o At least 3 days (72 hours) have passed since recovery defined as resolution of fever without the use of fever-reducing medications and improvement in respiratory symptoms (e.g. cough, shortness of breath), and   o At least 10 days have passed since the first positive test.       As one of the next steps, you will receive a call or text from the [place] Department of Health ([facility]) COVID-19 Tracing Team. See the contact information below so you know not to ignore the health departments call. It is important that you contact them back immediately so they can help.     Contact Tracer Number:  [phone]  Caller ID for most carriers: [place] Dept Health    What is contact tracing?   Contact tracing is a process that helps identify everyone who has been in close contact with an infected person. Contact tracers let those people know they may have been exposed and guide them on next steps. Confidentiality is important for everyone; no one will be told who may have  exposed them to the virus.   Your involvement is important. The more we know about where and how this virus is spreading, the better chance we have at stopping it from spreading further.  What does exposure mean?   Exposure means you have been within 6 feet for more than 15 minutes with a person who has or had COVID-19.  What kind of questions do the contact tracers ask?   A contact tracer will confirm your basic contact information including name, address, phone number, and next of kin, as well as asking about any symptoms you may have had. Theyll also ask you how you think you may have gotten sick, such as places where you may have been exposed to the virus, and people you were with. Those names will never be shared with anyone outside of that call, and will only be used to help trace and stop the spread of the virus.   I have privacy concerns. How will the state use my information?   Your privacy about your health is important. All calls are completed using call centers that use the appropriate health privacy protection measures (HIPAA compliance), meaning that your patient information is safe. No one will ever ask you any questions related to immigration status. Your health comes first.   Do I have to participate?   You do not have to participate, but we strongly encourage you to. Contact tracing can help us catch and control new outbreaks as theyre developing to keep your friends and family safe.   What if I dont hear from anyone?   If you dont receive a call within 24 hours, you can call the number above right away to inquire about your status. That line is open from 8:00 am - 8:00 p.m., 7 days a week.  Contact tracing saves lives! Together, we have the power to beat this virus and keep our loved ones and neighbors safe.       Instructions for household members, intimate partners and caregivers in a non-healthcare setting of a patient with confirmed or suspected COVID-19:         Close contacts  should monitor their health and call their healthcare provider right away if they develop symptoms suggestive of COVID-19 (e.g., fever, cough, shortness of breath).    Stay home except to get medical care. Separate yourself from other people and animals in the home.   Monitor the patients symptoms. If the patient is getting sicker, call his or her healthcare provider. If the patient has a medical emergency and you need to call 911, notify the dispatch personnel that the patient has or is being evaluated for COVID-19.    Wear a facemask when around other people such as sharing a room or vehicle and before entering a healthcare provider's office.   Cover coughs and sneezes with a tissue. Throw used tissues in a lined trash can immediately and wash hands.   Clean hands often with soap and water for at least 20 seconds or with an alcohol-based hand , rubbing hands together until they feel dry. Avoid touching your eyes, nose, and mouth with unwashed hands.   Clean all high-touch; surfaces every day, including counters, tabletops, doorknobs, bathroom fixtures, toilets, phones, keyboards, tablets, bedside tables, etc. Use a household cleaning spray or wipe according to label instructions.   Avoid sharing personal household items such as dishes, drinking glasses, cups, towels, bedding, etc. After these items are used, they should be washed thoroughly with soap and water.   Continue isolation until:   At least 3 days (72 hours) have passed since recovery defined as resolution of fever without the use of fever-reducing medications and improvement in respiratory symptoms (e.g. cough, shortness of breath), and    At least 10 days have passed since the patients first positive test.    https://www.cdc.gov/coronavirus/2019-ncov/your-health/index.htm

## 2020-07-25 ENCOUNTER — OFFICE VISIT (OUTPATIENT)
Dept: URGENT CARE | Facility: CLINIC | Age: 80
End: 2020-07-25
Payer: MEDICARE

## 2020-07-25 VITALS
HEIGHT: 63 IN | RESPIRATION RATE: 16 BRPM | TEMPERATURE: 98 F | HEART RATE: 83 BPM | WEIGHT: 160 LBS | SYSTOLIC BLOOD PRESSURE: 124 MMHG | BODY MASS INDEX: 28.35 KG/M2 | DIASTOLIC BLOOD PRESSURE: 83 MMHG | OXYGEN SATURATION: 99 %

## 2020-07-25 DIAGNOSIS — N30.01 ACUTE CYSTITIS WITH HEMATURIA: Primary | ICD-10-CM

## 2020-07-25 DIAGNOSIS — R50.9 FEVER, UNSPECIFIED FEVER CAUSE: ICD-10-CM

## 2020-07-25 LAB
BILIRUB UR QL STRIP: NEGATIVE
GLUCOSE UR QL STRIP: NEGATIVE
KETONES UR QL STRIP: NEGATIVE
LEUKOCYTE ESTERASE UR QL STRIP: POSITIVE
PH, POC UA: 5.5 (ref 5–8)
POC BLOOD, URINE: POSITIVE
POC NITRATES, URINE: NEGATIVE
PROT UR QL STRIP: POSITIVE
SARS-COV-2 RNA RESP QL NAA+PROBE: NOT DETECTED
SP GR UR STRIP: 1.02 (ref 1–1.03)
UROBILINOGEN UR STRIP-ACNC: NORMAL (ref 0.1–1.1)

## 2020-07-25 PROCEDURE — 87086 URINE CULTURE/COLONY COUNT: CPT | Mod: HCNC

## 2020-07-25 PROCEDURE — 81003 URINALYSIS AUTO W/O SCOPE: CPT | Mod: QW,S$GLB,, | Performed by: NURSE PRACTITIONER

## 2020-07-25 PROCEDURE — 81003 POCT URINALYSIS, DIPSTICK, AUTOMATED, W/O SCOPE: ICD-10-PCS | Mod: QW,S$GLB,, | Performed by: NURSE PRACTITIONER

## 2020-07-25 PROCEDURE — 99214 OFFICE O/P EST MOD 30 MIN: CPT | Mod: 25,S$GLB,, | Performed by: NURSE PRACTITIONER

## 2020-07-25 PROCEDURE — 99214 PR OFFICE/OUTPT VISIT, EST, LEVL IV, 30-39 MIN: ICD-10-PCS | Mod: 25,S$GLB,, | Performed by: NURSE PRACTITIONER

## 2020-07-25 RX ORDER — CIPROFLOXACIN 500 MG/1
500 TABLET ORAL 2 TIMES DAILY
Qty: 14 TABLET | Refills: 0 | Status: SHIPPED | OUTPATIENT
Start: 2020-07-25 | End: 2020-08-01

## 2020-07-25 NOTE — PROGRESS NOTES
"Subjective:       Patient ID: Zack Hopper is a 80 y.o. female.    Vitals:  height is 5' 3" (1.6 m) and weight is 72.6 kg (160 lb). Her temperature is 97.5 °F (36.4 °C). Her blood pressure is 124/83 and her pulse is 83. Her respiration is 16 and oxygen saturation is 99%.     Chief Complaint: Dysuria    Patient presents with concern for a UTI.  She was tested for Covid 19 yesterday for a fever of 100.9F.  She ntoes that she has no URI symptoms or loss of taste/smell with no diarrhea.  And that actually she does have a funny feeling when she urinates.  States in the past she did not have any symptoms and ended up uroseptic in April 2020, she was admitted at that time in [place].  Denies foul odor to urine.  Denies urgency or frequency to urinate.  Fever last night as well.  Denies any Covid 19 exposure.  Lives with  and he is fine.    Dysuria   This is a new problem. The current episode started yesterday. The problem occurs every urination. The patient is experiencing no pain. Pertinent negatives include no behavior changes, chills, discharge, flank pain, frequency, hematuria, hesitancy, nausea, possible pregnancy, sweats, urgency, vomiting, weight loss, bubble bath use, constipation, rash or withholding. She has tried nothing for the symptoms. There is no history of recurrent UTIs.   Fever   This is a new problem. The current episode started yesterday. The problem has been unchanged. Associated symptoms include urinary pain. Pertinent negatives include no abdominal pain, chest pain, congestion, coughing, diarrhea, ear pain, headaches, muscle aches, nausea, rash, sleepiness, sore throat, vomiting or wheezing.   Risk factors: no contaminated food, no contaminated water, no hx of cancer, no immunosuppression, no occupational exposure, no recent sickness, no recent travel and no sick contacts        Constitution: Positive for fever. Negative for chills.   HENT: Negative for ear pain, congestion, " postnasal drip, sinus pain and sore throat.    Neck: Negative for neck pain, neck stiffness and painful lymph nodes.   Cardiovascular: Negative for chest pain, sob on exertion and passing out.   Respiratory: Negative for cough and wheezing.    Gastrointestinal: Negative for abdominal pain, nausea, vomiting, constipation and diarrhea.   Genitourinary: Positive for dysuria. Negative for frequency, urgency, urine decreased, flank pain, hematuria, history of kidney stones, painful menstruation, irregular menstruation, missed menses, heavy menstrual bleeding, ovarian cysts, genital trauma, vaginal pain, vaginal discharge, vaginal bleeding, vaginal odor, painful intercourse, genital sore, painful ejaculation and pelvic pain.   Musculoskeletal: Negative for back pain.   Skin: Negative for rash and lesion.   Neurological: Negative for headaches.   Hematologic/Lymphatic: Negative for swollen lymph nodes.       Objective:      Physical Exam   Constitutional: She is oriented to person, place, and time. She appears well-developed.   HENT:   Head: Normocephalic and atraumatic.   Ears:   Right Ear: External ear normal.   Left Ear: External ear normal.   Nose: Nose normal. No nasal deformity. No epistaxis.   Mouth/Throat: Oropharynx is clear and moist and mucous membranes are normal.   Eyes: Lids are normal.   Neck: Trachea normal, normal range of motion and phonation normal. Neck supple.   Cardiovascular: Normal pulses.   Pulmonary/Chest: Effort normal.   Abdominal: Soft. Normal appearance and bowel sounds are normal. She exhibits no distension. There is no abdominal tenderness.   Neurological: She is alert and oriented to person, place, and time.   Skin: Skin is warm, dry and intact. Psychiatric: Her speech is normal and behavior is normal.   Nursing note and vitals reviewed.    Results for orders placed or performed in visit on 07/25/20   POCT Urinalysis, Dipstick, Automated, W/O Scope   Result Value Ref Range    POC Blood,  Urine Positive (A) Negative    POC Bilirubin, Urine Negative Negative    POC Urobilinogen, Urine Normal 0.1 - 1.1    POC Ketones, Urine Negative Negative    POC Protein, Urine Positive (A) Negative    POC Nitrates, Urine Negative Negative    POC Glucose, Urine Negative Negative    pH, UA 5.5 5 - 8    POC Specific Gravity, Urine 1.020 1.003 - 1.029    POC Leukocytes, Urine Positive (A) Negative         Assessment:       1. Acute cystitis with hematuria    2. Fever, unspecified fever cause        Plan:       UA reviewed.  Covid still pending.  Acute cystitis with hematuria  -     Urine culture  -     ciprofloxacin HCl (CIPRO) 500 MG tablet; Take 1 tablet (500 mg total) by mouth 2 (two) times daily. for 7 days  Dispense: 14 tablet; Refill: 0    Fever, unspecified fever cause  -     POCT Urinalysis, Dipstick, Automated, W/O Scope      Patient Instructions   Covid 19 test is still pending.  Continue with Quarantine.                                                                      UTI   If your condition worsens or fails to improve we recommend that you receive another evaluation at the ER immediately or contact your PCP to discuss your concerns or return here. You must understand that you've received an urgent care treatment only and that you may be released before all your medical problems are known or treated. You the patient will arrange for followup care as instructed.   If you had cultures done it will take 3-5 days to result. We will call you with the result.   If you are are female and on BCP use additional methods to prevent pregnancy while on the antibiotics and for one cycle after.   Cranberry juice may help. Get the 100% cranberry juice and mix 4 oz of juice with 4 oz of water and drink this 8 oz glass of liquid once a day.   Increase water intake to at least 8-10 glasses/day.  Avoid caffeine, alcohol, or spicy foods as they irritate the bladder.        Bladder Infection, Female (Adult)    Urine is  "normally doesn't have any bacteria in it. But bacteria can get into the urinary tract from the skin around the rectum. Or they can travel in the blood from elsewhere in the body. Once they are in your urinary tract, they can cause infection in the urethra (urethritis), the bladder (cystitis), or the kidneys (pyelonephritis).  The most common place for an infection is in the bladder. This is called a bladder infection. This is one of the most common infections in women. Most bladder infections are easily treated. They are not serious unless the infection spreads to the kidney.  The phrases "bladder infection," "UTI," and "cystitis" are often used to describe the same thing. But they are not always the same. Cystitis is an inflammation of the bladder. The most common cause of cystitis is an infection.  Symptoms  The infection causes inflammation in the urethra and bladder. This causes many of the symptoms. The most common symptoms of a bladder infection are:  · Pain or burning when urinating  · Having to urinate more often than usual  · Urgent need to urinate  · Only a small amount of urine comes out  · Blood in urine  · Abdominal discomfort. This is usually in the lower abdomen above the pubic bone.  · Cloudy urine  · Strong- or bad-smelling urine  · Unable to urinate (urinary retention)  · Unable to hold urine in (urinary incontinence)  · Fever  · Loss of appetite  · Confusion (in older adults)  Causes  Bladder infections are not contagious. You can't get one from someone else, from a toilet seat, or from sharing a bath.  The most common cause of bladder infections is bacteria from the bowels. The bacteria get onto the skin around the opening of the urethra. From there, they can get into the urine and travel up to the bladder, causing inflammation and infection. This usually happens because of:  · Wiping improperly after urinating. Always wipe from front to back.  · Bowel incontinence  · Pregnancy  · Procedures such " as having a catheter inserted  · Older age  · Not emptying your bladder. This can allow bacteria a chance to grow in your urine.  · Dehydration  · Constipation  · Sex  · Use of a diaphragm for birth control   Treatment  Bladder infections are diagnosed by a urine test. They are treated with antibiotics and usually clear up quickly without complications. Treatment helps prevent a more serious kidney infection.  Medicines  Medicines can help in the treatment of a bladder infection:  · Take antibiotics until they are used up, even if you feel better. It is important to finish them to make sure the infection has cleared.  · You can use acetaminophen or ibuprofen for pain, fever, or discomfort, unless another medicine was prescribed. If you have chronic liver or kidney disease, talk with your healthcare provider before using these medicines. Also talk with your provider if you've ever had a stomach ulcer or gastrointestinal bleeding, or are taking blood-thinner medicines.  · If you are given phenazopydridine to reduce burning with urination, it will cause your urine to become a bright orange color. This can stain clothing.  Care and prevention  These self-care steps can help prevent future infections:  · Drink plenty of fluids to prevent dehydration and flush out your bladder. Do this unless you must restrict fluids for other health reasons, or your doctor told you not to.  · Proper cleaning after going to the bathroom is important. Wipe from front to back after using the toilet to prevent the spread of bacteria.  · Urinate more often. Don't try to hold urine in for a long time.  · Wear loose-fitting clothes and cotton underwear. Avoid tight-fitting pants.  · Improve your diet and prevent constipation. Eat more fresh fruit and vegetables, and fiber, and less junk and fatty foods.  · Avoid sex until your symptoms are gone.  · Avoid caffeine, alcohol, and spicy foods. These can irritate your bladder.  · Urinate right after  intercourse to flush out your bladder.  · If you use birth control pills and have frequent bladder infections, discuss it with your doctor.  Follow-up care  Call your healthcare provider if all symptoms are not gone after 3 days of treatment. This is especially important if you have repeat infections.  If a culture was done, you will be told if your treatment needs to be changed. If directed, you can call to find out the results.  If X-rays were done, you will be told if the results will affect your treatment.  Call 911  Call 911 if any of the following occur:  · Trouble breathing  · Hard to wake up or confusion  · Fainting or loss of consciousness  · Rapid heart rate  When to seek medical advice  Call your healthcare provider right away if any of these occur:  · Fever of 100.4ºF (38.0ºC) or higher, or as directed by your healthcare provider  · Symptoms are not better by the third day of treatment  · Back or belly (abdominal) pain that gets worse  · Repeated vomiting, or unable to keep medicine down  · Weakness or dizziness  · Vaginal discharge  · Pain, redness, or swelling in the outer vaginal area (labia)  Date Last Reviewed: 10/1/2016  © 6924-0564 [employer]. [address] [place] [address], [place] [address]. All rights reserved. This information is not intended as a substitute for professional medical care. Always follow your healthcare professional's instructions.

## 2020-07-25 NOTE — PATIENT INSTRUCTIONS
"Covid 19 test is still pending.  Continue with Quarantine.                                                                      UTI   If your condition worsens or fails to improve we recommend that you receive another evaluation at the ER immediately or contact your PCP to discuss your concerns or return here. You must understand that you've received an urgent care treatment only and that you may be released before all your medical problems are known or treated. You the patient will arrange for followup care as instructed.   If you had cultures done it will take 3-5 days to result. We will call you with the result.   If you are are female and on BCP use additional methods to prevent pregnancy while on the antibiotics and for one cycle after.   Cranberry juice may help. Get the 100% cranberry juice and mix 4 oz of juice with 4 oz of water and drink this 8 oz glass of liquid once a day.   Increase water intake to at least 8-10 glasses/day.  Avoid caffeine, alcohol, or spicy foods as they irritate the bladder.        Bladder Infection, Female (Adult)    Urine is normally doesn't have any bacteria in it. But bacteria can get into the urinary tract from the skin around the rectum. Or they can travel in the blood from elsewhere in the body. Once they are in your urinary tract, they can cause infection in the urethra (urethritis), the bladder (cystitis), or the kidneys (pyelonephritis).  The most common place for an infection is in the bladder. This is called a bladder infection. This is one of the most common infections in women. Most bladder infections are easily treated. They are not serious unless the infection spreads to the kidney.  The phrases "bladder infection," "UTI," and "cystitis" are often used to describe the same thing. But they are not always the same. Cystitis is an inflammation of the bladder. The most common cause of cystitis is an infection.  Symptoms  The infection causes inflammation in the urethra " and bladder. This causes many of the symptoms. The most common symptoms of a bladder infection are:  · Pain or burning when urinating  · Having to urinate more often than usual  · Urgent need to urinate  · Only a small amount of urine comes out  · Blood in urine  · Abdominal discomfort. This is usually in the lower abdomen above the pubic bone.  · Cloudy urine  · Strong- or bad-smelling urine  · Unable to urinate (urinary retention)  · Unable to hold urine in (urinary incontinence)  · Fever  · Loss of appetite  · Confusion (in older adults)  Causes  Bladder infections are not contagious. You can't get one from someone else, from a toilet seat, or from sharing a bath.  The most common cause of bladder infections is bacteria from the bowels. The bacteria get onto the skin around the opening of the urethra. From there, they can get into the urine and travel up to the bladder, causing inflammation and infection. This usually happens because of:  · Wiping improperly after urinating. Always wipe from front to back.  · Bowel incontinence  · Pregnancy  · Procedures such as having a catheter inserted  · Older age  · Not emptying your bladder. This can allow bacteria a chance to grow in your urine.  · Dehydration  · Constipation  · Sex  · Use of a diaphragm for birth control   Treatment  Bladder infections are diagnosed by a urine test. They are treated with antibiotics and usually clear up quickly without complications. Treatment helps prevent a more serious kidney infection.  Medicines  Medicines can help in the treatment of a bladder infection:  · Take antibiotics until they are used up, even if you feel better. It is important to finish them to make sure the infection has cleared.  · You can use acetaminophen or ibuprofen for pain, fever, or discomfort, unless another medicine was prescribed. If you have chronic liver or kidney disease, talk with your healthcare provider before using these medicines. Also talk with your  provider if you've ever had a stomach ulcer or gastrointestinal bleeding, or are taking blood-thinner medicines.  · If you are given phenazopydridine to reduce burning with urination, it will cause your urine to become a bright orange color. This can stain clothing.  Care and prevention  These self-care steps can help prevent future infections:  · Drink plenty of fluids to prevent dehydration and flush out your bladder. Do this unless you must restrict fluids for other health reasons, or your doctor told you not to.  · Proper cleaning after going to the bathroom is important. Wipe from front to back after using the toilet to prevent the spread of bacteria.  · Urinate more often. Don't try to hold urine in for a long time.  · Wear loose-fitting clothes and cotton underwear. Avoid tight-fitting pants.  · Improve your diet and prevent constipation. Eat more fresh fruit and vegetables, and fiber, and less junk and fatty foods.  · Avoid sex until your symptoms are gone.  · Avoid caffeine, alcohol, and spicy foods. These can irritate your bladder.  · Urinate right after intercourse to flush out your bladder.  · If you use birth control pills and have frequent bladder infections, discuss it with your doctor.  Follow-up care  Call your healthcare provider if all symptoms are not gone after 3 days of treatment. This is especially important if you have repeat infections.  If a culture was done, you will be told if your treatment needs to be changed. If directed, you can call to find out the results.  If X-rays were done, you will be told if the results will affect your treatment.  Call 911  Call 911 if any of the following occur:  · Trouble breathing  · Hard to wake up or confusion  · Fainting or loss of consciousness  · Rapid heart rate  When to seek medical advice  Call your healthcare provider right away if any of these occur:  · Fever of 100.4ºF (38.0ºC) or higher, or as directed by your healthcare provider  · Symptoms  are not better by the third day of treatment  · Back or belly (abdominal) pain that gets worse  · Repeated vomiting, or unable to keep medicine down  · Weakness or dizziness  · Vaginal discharge  · Pain, redness, or swelling in the outer vaginal area (labia)  Date Last Reviewed: 10/1/2016  © 5281-3893 [employer]. [address] [place] [address], [place] [address]. All rights reserved. This information is not intended as a substitute for professional medical care. Always follow your healthcare professional's instructions.

## 2020-07-26 ENCOUNTER — TELEPHONE (OUTPATIENT)
Dept: URGENT CARE | Facility: CLINIC | Age: 80
End: 2020-07-26

## 2020-07-27 LAB
BACTERIA UR CULT: NORMAL
BACTERIA UR CULT: NORMAL

## 2020-07-28 ENCOUNTER — TELEPHONE (OUTPATIENT)
Dept: URGENT CARE | Facility: CLINIC | Age: 80
End: 2020-07-28

## 2020-07-31 PROCEDURE — 99285 EMERGENCY DEPT VISIT HI MDM: CPT | Mod: HCNC,,, | Performed by: EMERGENCY MEDICINE

## 2020-07-31 PROCEDURE — 99285 EMERGENCY DEPT VISIT HI MDM: CPT | Mod: 25,HCNC

## 2020-07-31 PROCEDURE — 99285 PR EMERGENCY DEPT VISIT,LEVEL V: ICD-10-PCS | Mod: HCNC,,, | Performed by: EMERGENCY MEDICINE

## 2020-08-01 ENCOUNTER — HOSPITAL ENCOUNTER (EMERGENCY)
Facility: HOSPITAL | Age: 80
Discharge: HOME | End: 2020-08-01
Attending: EMERGENCY MEDICINE
Payer: MEDICARE

## 2020-08-01 VITALS
DIASTOLIC BLOOD PRESSURE: 76 MMHG | SYSTOLIC BLOOD PRESSURE: 151 MMHG | RESPIRATION RATE: 18 BRPM | HEART RATE: 73 BPM | OXYGEN SATURATION: 98 % | TEMPERATURE: 98 F

## 2020-08-01 DIAGNOSIS — R44.1 VISUAL HALLUCINATION: Primary | ICD-10-CM

## 2020-08-01 DIAGNOSIS — T50.905A MEDICATION REACTION, INITIAL ENCOUNTER: ICD-10-CM

## 2020-08-01 LAB
ALBUMIN SERPL BCP-MCNC: 3.6 G/DL (ref 3.5–5.2)
ALP SERPL-CCNC: 60 U/L (ref 55–135)
ALT SERPL W/O P-5'-P-CCNC: 17 U/L (ref 10–44)
ANION GAP SERPL CALC-SCNC: 8 MMOL/L (ref 8–16)
AST SERPL-CCNC: 15 U/L (ref 10–40)
BACTERIA #/AREA URNS AUTO: NORMAL /HPF
BASOPHILS # BLD AUTO: 0.07 K/UL (ref 0–0.2)
BASOPHILS NFR BLD: 1 % (ref 0–1.9)
BILIRUB SERPL-MCNC: 0.5 MG/DL (ref 0.1–1)
BILIRUB UR QL STRIP: NEGATIVE
BUN SERPL-MCNC: 12 MG/DL (ref 8–23)
CALCIUM SERPL-MCNC: 8.9 MG/DL (ref 8.7–10.5)
CHLORIDE SERPL-SCNC: 109 MMOL/L (ref 95–110)
CLARITY UR REFRACT.AUTO: CLEAR
CO2 SERPL-SCNC: 21 MMOL/L (ref 23–29)
COLOR UR AUTO: ABNORMAL
CREAT SERPL-MCNC: 0.8 MG/DL (ref 0.5–1.4)
DIFFERENTIAL METHOD: ABNORMAL
EOSINOPHIL # BLD AUTO: 0.3 K/UL (ref 0–0.5)
EOSINOPHIL NFR BLD: 3.9 % (ref 0–8)
ERYTHROCYTE [DISTWIDTH] IN BLOOD BY AUTOMATED COUNT: 12.1 % (ref 11.5–14.5)
EST. GFR  (AFRICAN AMERICAN): >60 ML/MIN/1.73 M^2
EST. GFR  (NON AFRICAN AMERICAN): >60 ML/MIN/1.73 M^2
GLUCOSE SERPL-MCNC: 107 MG/DL (ref 70–110)
GLUCOSE UR QL STRIP: NEGATIVE
HCT VFR BLD AUTO: 36.7 % (ref 37–48.5)
HGB BLD-MCNC: 11.6 G/DL (ref 12–16)
HGB UR QL STRIP: ABNORMAL
IMM GRANULOCYTES # BLD AUTO: 0.02 K/UL (ref 0–0.04)
IMM GRANULOCYTES NFR BLD AUTO: 0.3 % (ref 0–0.5)
KETONES UR QL STRIP: NEGATIVE
LEUKOCYTE ESTERASE UR QL STRIP: NEGATIVE
LYMPHOCYTES # BLD AUTO: 2.1 K/UL (ref 1–4.8)
LYMPHOCYTES NFR BLD: 30.8 % (ref 18–48)
MCH RBC QN AUTO: 30.5 PG (ref 27–31)
MCHC RBC AUTO-ENTMCNC: 31.6 G/DL (ref 32–36)
MCV RBC AUTO: 97 FL (ref 82–98)
MICROSCOPIC COMMENT: NORMAL
MONOCYTES # BLD AUTO: 0.6 K/UL (ref 0.3–1)
MONOCYTES NFR BLD: 9.4 % (ref 4–15)
NEUTROPHILS # BLD AUTO: 3.7 K/UL (ref 1.8–7.7)
NEUTROPHILS NFR BLD: 54.6 % (ref 38–73)
NITRITE UR QL STRIP: NEGATIVE
NRBC BLD-RTO: 0 /100 WBC
PH UR STRIP: 6 [PH] (ref 5–8)
PLATELET # BLD AUTO: 248 K/UL (ref 150–350)
PMV BLD AUTO: 10.3 FL (ref 9.2–12.9)
POTASSIUM SERPL-SCNC: 3.5 MMOL/L (ref 3.5–5.1)
PROT SERPL-MCNC: 6.8 G/DL (ref 6–8.4)
PROT UR QL STRIP: NEGATIVE
RBC # BLD AUTO: 3.8 M/UL (ref 4–5.4)
RBC #/AREA URNS AUTO: 2 /HPF (ref 0–4)
SODIUM SERPL-SCNC: 138 MMOL/L (ref 136–145)
SP GR UR STRIP: 1 (ref 1–1.03)
SQUAMOUS #/AREA URNS AUTO: 1 /HPF
URN SPEC COLLECT METH UR: ABNORMAL
WBC # BLD AUTO: 6.71 K/UL (ref 3.9–12.7)
WBC #/AREA URNS AUTO: 1 /HPF (ref 0–5)

## 2020-08-01 PROCEDURE — 81001 URINALYSIS AUTO W/SCOPE: CPT | Mod: HCNC

## 2020-08-01 PROCEDURE — 85025 COMPLETE CBC W/AUTO DIFF WBC: CPT | Mod: HCNC

## 2020-08-01 PROCEDURE — 93010 EKG 12-LEAD: ICD-10-PCS | Mod: HCNC,,, | Performed by: INTERNAL MEDICINE

## 2020-08-01 PROCEDURE — 80053 COMPREHEN METABOLIC PANEL: CPT | Mod: HCNC

## 2020-08-01 PROCEDURE — 93005 ELECTROCARDIOGRAM TRACING: CPT | Mod: HCNC

## 2020-08-01 PROCEDURE — 93010 ELECTROCARDIOGRAM REPORT: CPT | Mod: HCNC,,, | Performed by: INTERNAL MEDICINE

## 2020-08-01 NOTE — ED TRIAGE NOTES
"Zack Hopper, an 80 y.o. female presents to the ED c/o Visual Hallucinations "seeing people in the house that weren't there." Recently Dx with UTI- finished cipro yesterday. Pt reports this happened last time she had a UTI but it went away after antibiotics. -Auditory hallucinations.      Chief Complaint   Patient presents with    Altered Mental Status     pt states she is seeing people that are not there and feels off balance. Recent dx of UTI; just finished antibiotcs today. Pt AAO x 3.     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 -Visual hallucinations at this time.  APPEARANCE: Patient appears comfortable and in no acute distress, patient is clean and well groomed.  SKIN: The skin is warm and dry, color consistent with ethnicity, patient has normal skin turgor and moist mucus membranes, skin intact, no breakdown or bruising noted.   MUSCULOSKELETAL: Patient moving all extremities spontaneously, no swelling noted. Ambulatory.  RESPIRATORY: Airway is open and patent, respirations are spontaneous, patient has a normal effort and rate, no accessory muscle use noted. -SOB.  CARDIAC: Patient has a normal rate and regular rhythm, no edema noted, capillary refill < 3 seconds. -Chest pain.  GASTRO: Soft and non tender to palpation, no distention noted. -N/V/D.  : Pt denies any pain or frequency with urination. Denies odor.   NEURO: Pt opens eyes spontaneously, behavior appropriate to situation, follows commands, facial expression symmetrical, bilateral hand grasp equal and even, purposeful " motor response noted, normal sensation in all extremities when touched with a finger.

## 2020-08-01 NOTE — ED PROVIDER NOTES
"Encounter Date: 2020       History     Chief Complaint   Patient presents with    Altered Mental Status     pt states she is seeing people that are not there and feels off balance. Recent dx of UTI; just finished antibiotcs today. Pt AAO x 3.     Patient is a 80 year old female with PMH of HLD, HTN, vitamin d deficiency who presents to the ED with complaints of visual hallucinations that began tonight at about 11 pm. Also reports feeling "off balance" on her feet but denies dizziness, lightheadedness. She reports that she had a fever about one week ago (T max 100.5). She was seen at an urgent care, diagnosed with UTI and treated with cipro. Completed antibiotics today. Denies CP, SOB, cough, abdominal pain, NVD, dysuria. Reports a similar episode of visual hallucinations that occurred several months ago that was secondary to UTI. This is the extent of the patient's complaints.         Review of patient's allergies indicates:   Allergen Reactions    Sulfacetamide      Other reaction(s): Unknown     Past Medical History:   Diagnosis Date    DJD (degenerative joint disease)     Hyperlipidemia     Hypertension     Nuclear sclerosis - Both Eyes 2012    Rosacea     Vitamin D deficiency disease      Past Surgical History:   Procedure Laterality Date    APPENDECTOMY      BREAST BIOPSY Left     2015    BREAST BIOPSY Right     10/2015 ex bx- papilloma     breast biopsy, left      10/2015     SECTION, CLASSIC      Three times    CHOLECYSTECTOMY      INJECTION OF ANESTHETIC AGENT INTO SACROILIAC JOINT Left 2018    Procedure: BLOCK, SACROILIAC JOINT- Left - ORAL SEDATION;  Surgeon: Ricci Lockett Jr., MD;  Location: [facility] PAIN MGT;  Service: Pain Management;  Laterality: Left;    INJECTION OF ANESTHETIC AGENT INTO SACROILIAC JOINT Left 2019    Procedure: BLOCK, SACROILIAC JOINT---Left W/Oral Sedation;  Surgeon: Ricci Lockett Jr., MD;  Location: [facility] PAIN MGT;  Service: Pain " Management;  Laterality: Left;    JOINT REPLACEMENT Right     TKA    SHOULDER OPEN ROTATOR CUFF REPAIR Right     TOTAL KNEE ARTHROPLASTY      right knee     Family History   Problem Relation Age of Onset    Diabetes Maternal Grandfather     Hypertension Mother     Stroke Father     Heart disease Father     Cataracts Sister     Hypertension Sister     Migraines Sister     Diabetes Brother         d. 50's in MVA (s/p BKA)    Breast cancer Sister 77        stage 4     Kidney failure Brother         d.65    Kidney disease Brother         ESRD/Dialysis    Other Brother         d.34.s/p appendectomy    No Known Problems Son         lives in [place]    No Known Problems Son         lives in [place]    No Known Problems Son         his son is Autistic,nonverbal, dtr 2 yrs older- lives [place]    Rectal cancer Neg Hx     Esophageal cancer Neg Hx     Colon cancer Neg Hx     Stomach cancer Neg Hx      Social History     Tobacco Use    Smoking status: Never Smoker    Smokeless tobacco: Never Used   Substance Use Topics    Alcohol use: Yes     Alcohol/week: 1.0 standard drinks     Types: 1 Glasses of wine per week     Comment: approximately 2 glasses of wine weekly    Drug use: No     Review of Systems   Constitutional: Positive for fever (resolved). Negative for chills.   HENT: Negative for congestion and sore throat.    Eyes: Negative for visual disturbance.   Respiratory: Negative for shortness of breath.    Cardiovascular: Negative for chest pain.   Gastrointestinal: Negative for nausea.   Genitourinary: Negative for dysuria.   Musculoskeletal: Negative for back pain.   Skin: Negative for rash.   Neurological: Negative for weakness and headaches.   Hematological: Does not bruise/bleed easily.   Psychiatric/Behavioral: Positive for hallucinations.       Physical Exam     Initial Vitals [07/31/20 2335]   BP Pulse Resp Temp SpO2   (!) 174/81 72 17 97.9 °F (36.6 °C) 96 %      MAP       --          Physical Exam    Nursing note and vitals reviewed.  Constitutional: She appears well-developed and well-nourished. She is not diaphoretic. No distress.   HENT:   Head: Normocephalic and atraumatic.   Mouth/Throat: Oropharynx is clear and moist.   Eyes: Conjunctivae and EOM are normal. Pupils are equal, round, and reactive to light.   Neck: Normal range of motion. Neck supple.   Cardiovascular: Normal rate, regular rhythm, normal heart sounds and intact distal pulses. Exam reveals no gallop and no friction rub.    No murmur heard.  Pulmonary/Chest: Breath sounds normal. She has no wheezes. She has no rhonchi. She has no rales.   Abdominal: Soft. Bowel sounds are normal. There is no abdominal tenderness.   Musculoskeletal: Normal range of motion.   Neurological: She is alert and oriented to person, place, and time. She has normal strength. No cranial nerve deficit or sensory deficit. GCS score is 15. GCS eye subscore is 4. GCS verbal subscore is 5. GCS motor subscore is 6.   Skin: Skin is warm and dry. Capillary refill takes less than 2 seconds.   Psychiatric: She has a normal mood and affect. Her behavior is normal. Judgment and thought content normal.         ED Course   Procedures  Labs Reviewed   URINALYSIS, REFLEX TO URINE CULTURE - Abnormal; Notable for the following components:       Result Value    Occult Blood UA 1+ (*)     All other components within normal limits    Narrative:     Specimen Source->Urine   CBC W/ AUTO DIFFERENTIAL - Abnormal; Notable for the following components:    RBC 3.80 (*)     Hemoglobin 11.6 (*)     Hematocrit 36.7 (*)     Mean Corpuscular Hemoglobin Conc 31.6 (*)     All other components within normal limits   COMPREHENSIVE METABOLIC PANEL - Abnormal; Notable for the following components:    CO2 21 (*)     All other components within normal limits   URINALYSIS MICROSCOPIC    Narrative:     Specimen Source->Urine     EKG Readings: (Independently Interpreted)   Initial Reading: No  STEMI. Rhythm: Normal Sinus Rhythm. Heart Rate: 67.       Imaging Results          CT Head Without Contrast (Final result)  Result time 08/01/20 01:16:21    Final result by William Oliva MD (08/01/20 01:16:21)                 Impression:      No evidence of acute intracranial pathology.    Mild chronic microvascular ischemic disease.    Electronically signed by resident: Sujit Lopez  Date:    08/01/2020  Time:    00:50    Electronically signed by: William Oliva MD  Date:    08/01/2020  Time:    01:16             Narrative:    EXAMINATION:  CT HEAD WITHOUT CONTRAST    CLINICAL HISTORY:  Altered mental status;    TECHNIQUE:  Low dose axial CT images obtained throughout the head without the use of intravenous contrast.  Axial, sagittal and coronal reconstructions were performed.    COMPARISON:  None.    FINDINGS:  Intracranial compartment:    Ventricles and sulci are normal in size for age without evidence of hydrocephalus.    There are patchy areas of decreased attenuation consistent with sequela of mild chronic microvascular ischemic disease.  Otherwise, the brain parenchyma appears within normal limits.    No parenchymal mass, hemorrhage, or edema.  No evidence of remote or acute major vascular distribution infarct.    No extra-axial blood or fluid collections.    Skull/extracranial contents (limited evaluation):    No fracture. Mastoid air cells and paranasal sinuses are essentially clear.    Bilateral cataracts noted, right more hyperattenuating than left.                               X-Ray Chest AP Portable (Final result)  Result time 08/01/20 00:44:24    Final result by William Oliva MD (08/01/20 00:44:24)                 Impression:      No acute findings.    No significant change from prior study.      Electronically signed by: William Oliva MD  Date:    08/01/2020  Time:    00:44             Narrative:    EXAMINATION:  XR CHEST AP PORTABLE    CLINICAL HISTORY:  Altered mental status,  unspecified    TECHNIQUE:  Single frontal view of the chest was performed.    COMPARISON:  May 3, 2017.    FINDINGS:  Heart and lungs  appear unchanged when allowing for differences in technique and positioning.                                 Medical Decision Making:   History:   Old Medical Records: I decided to obtain old medical records.  Initial Assessment:   Emergent evaluation of a 80-year-old female who presents to the emergency department with complaints of visual hallucinations, unsteady gait that began tonight at 11 pm. She reports seeing people in her bedroom with her. Additionally reports fever about one week ago with T max of 100.5.  She was seen at an urgent care, diagnosed with a urinary tract infection and discharged with Cipro.  She took her last pill today.  Patient is afebrile, hemodynamically stable, and nontoxic appearing.  Will order labs, imaging, EKG and continue to monitor.  Differential Diagnosis:   Differential diagnosis includes but is not limited to urinary tract infection, intracranial hemorrhage, medication reaction, psychosis.  Independently Interpreted Test(s):   I have ordered and independently interpreted EKG Reading(s) - see prior notes  Clinical Tests:   Lab Tests: Ordered and Reviewed  Radiological Study: Ordered and Reviewed  Medical Tests: Reviewed and Ordered  ED Management:  CT head with out evidence of intracranial pathology.  Labs urinalysis grossly unremarkable.  Patient does report that she took some kind of sleeping pill tonight that her sister gave her.  She reports taking this paretic Mark over the last few months when she has difficulty sleeping.  She is unsure the name of this medication.  I believe that presentation today may be secondary to a medication reaction. She is instructed to discontinue this medication.  She reports that she is no longer experiencing the hallucinations in the emergency department.  Will discharge in stable condition.  She voices  understanding.  All questions answered.  The patient was instructed to follow up with a primary care provider in 2 days or to return to the emergency department for worsening symptoms. The treatment plan was discussed with the patient who demonstrated understanding and comfort with plan. The patient's history, physical exam, and plan of care was discussed with and agreed upon with my supervising physician.                      ED Course as of Aug 01 0637   Sat Aug 01, 2020   0230 WBC: 6.71 [JM]   0231 Hemoglobin(!): 11.6 [JM]   0231 Hematocrit(!): 36.7 [JM]   0231 Platelets: 248 [JM]   0231 Sodium: 138 [JM]   0231 Potassium: 3.5 [JM]   0231 Glucose: 107 [JM]   0231 BUN, Bld: 12 [JM]   0231 Creatinine: 0.8 [JM]   0231 Occult Blood UA(!): 1+ [JM]   0231 NITRITE UA: Negative [JM]   0231 Leukocytes, UA: Negative [JM]   0231 RBC, UA: 2 [JM]   0231 WBC, UA: 1 [JM]   0231 Bacteria, UA: Rare [JM]      ED Course User Index  [JM] Anel Long PA-C                Clinical Impression:     1. Visual hallucination    2. Medication reaction, initial encounter                ED Disposition Condition    Discharge Stable        ED Prescriptions     None        Follow-up Information     Follow up With Specialties Details Why Contact Info    Rosemarie Brooks MD Internal Medicine Schedule an appointment as soon as possible for a visit in 3 days  [address] [facility]  [place] [address]  [phone]      [facility] Emergency Medicine Go to  If symptoms worsen [address] [facility] [address] [facility] [address] [phone]

## 2020-09-10 DIAGNOSIS — Z01.818 PRE-OP TESTING: ICD-10-CM

## 2020-09-10 DIAGNOSIS — Z12.11 SPECIAL SCREENING FOR MALIGNANT NEOPLASMS, COLON: Primary | ICD-10-CM

## 2020-09-10 RX ORDER — POLYETHYLENE GLYCOL 3350, SODIUM SULFATE ANHYDROUS, SODIUM BICARBONATE, SODIUM CHLORIDE, POTASSIUM CHLORIDE 236; 22.74; 6.74; 5.86; 2.97 G/4L; G/4L; G/4L; G/4L; G/4L
4 POWDER, FOR SOLUTION ORAL ONCE
Qty: 4000 ML | Refills: 0 | Status: SHIPPED | OUTPATIENT
Start: 2020-09-10 | End: 2020-09-10

## 2020-09-17 ENCOUNTER — TELEPHONE (OUTPATIENT)
Dept: ENDOSCOPY | Facility: HOSPITAL | Age: 80
End: 2020-09-17

## 2020-09-17 NOTE — TELEPHONE ENCOUNTER
Spoke with [facility] on [address].They are ordering a different generic go-lytly prep due to a shortage of gavilyte.

## 2020-09-25 ENCOUNTER — PES CALL (OUTPATIENT)
Dept: ADMINISTRATIVE | Facility: CLINIC | Age: 80
End: 2020-09-25

## 2020-09-25 ENCOUNTER — NURSE TRIAGE (OUTPATIENT)
Dept: ADMINISTRATIVE | Facility: CLINIC | Age: 80
End: 2020-09-25

## 2020-09-25 NOTE — TELEPHONE ENCOUNTER
Patient returning call to Dr. Brooks.  Manoje sent to PCP to return call.  Patient has no further concerns at this time.     Reason for Disposition   [1] Follow-up call to recent contact AND [2] information only call, no triage required    Protocols used: INFORMATION ONLY CALL-A-

## 2020-09-28 ENCOUNTER — LAB VISIT (OUTPATIENT)
Dept: URGENT CARE | Facility: CLINIC | Age: 80
End: 2020-09-28
Payer: MEDICARE

## 2020-09-28 ENCOUNTER — HOSPITAL ENCOUNTER (OUTPATIENT)
Dept: RADIOLOGY | Facility: HOSPITAL | Age: 80
Discharge: HOME | End: 2020-09-28
Attending: INTERNAL MEDICINE
Payer: MEDICARE

## 2020-09-28 DIAGNOSIS — Z12.31 ENCOUNTER FOR SCREENING MAMMOGRAM FOR BREAST CANCER: ICD-10-CM

## 2020-09-28 DIAGNOSIS — Z01.818 PRE-OP TESTING: ICD-10-CM

## 2020-09-28 PROCEDURE — 77063 BREAST TOMOSYNTHESIS BI: CPT | Mod: 26,HCNC,, | Performed by: RADIOLOGY

## 2020-09-28 PROCEDURE — 77063 MAMMO DIGITAL SCREENING BILAT WITH TOMO: ICD-10-PCS | Mod: 26,HCNC,, | Performed by: RADIOLOGY

## 2020-09-28 PROCEDURE — 77067 SCR MAMMO BI INCL CAD: CPT | Mod: 26,HCNC,, | Performed by: RADIOLOGY

## 2020-09-28 PROCEDURE — 77067 MAMMO DIGITAL SCREENING BILAT WITH TOMO: ICD-10-PCS | Mod: 26,HCNC,, | Performed by: RADIOLOGY

## 2020-09-28 PROCEDURE — U0003 INFECTIOUS AGENT DETECTION BY NUCLEIC ACID (DNA OR RNA); SEVERE ACUTE RESPIRATORY SYNDROME CORONAVIRUS 2 (SARS-COV-2) (CORONAVIRUS DISEASE [COVID-19]), AMPLIFIED PROBE TECHNIQUE, MAKING USE OF HIGH THROUGHPUT TECHNOLOGIES AS DESCRIBED BY CMS-2020-01-R: HCPCS | Mod: HCNC

## 2020-09-28 PROCEDURE — 77067 SCR MAMMO BI INCL CAD: CPT | Mod: TC,HCNC,PO

## 2020-09-29 ENCOUNTER — TELEPHONE (OUTPATIENT)
Dept: INTERNAL MEDICINE | Facility: CLINIC | Age: 80
End: 2020-09-29

## 2020-09-29 ENCOUNTER — PATIENT MESSAGE (OUTPATIENT)
Dept: OTHER | Facility: OTHER | Age: 80
End: 2020-09-29

## 2020-09-29 LAB — SARS-COV-2 RNA RESP QL NAA+PROBE: NOT DETECTED

## 2020-09-29 NOTE — TELEPHONE ENCOUNTER
----- Message from Rosemarie Brooks MD sent at 9/28/2020  4:53 PM CDT -----  Please note that your mammogram was read as follows  Impression:  There is no mammographic evidence of malignancy.  Rosemarie Perez

## 2020-10-01 ENCOUNTER — ANESTHESIA EVENT (OUTPATIENT)
Dept: ENDOSCOPY | Facility: HOSPITAL | Age: 80
End: 2020-10-01
Payer: MEDICARE

## 2020-10-01 ENCOUNTER — HOSPITAL ENCOUNTER (OUTPATIENT)
Facility: HOSPITAL | Age: 80
Discharge: HOME | End: 2020-10-01
Attending: INTERNAL MEDICINE | Admitting: INTERNAL MEDICINE
Payer: MEDICARE

## 2020-10-01 ENCOUNTER — ANESTHESIA (OUTPATIENT)
Dept: ENDOSCOPY | Facility: HOSPITAL | Age: 80
End: 2020-10-01
Payer: MEDICARE

## 2020-10-01 VITALS
BODY MASS INDEX: 28.35 KG/M2 | SYSTOLIC BLOOD PRESSURE: 189 MMHG | TEMPERATURE: 98 F | HEIGHT: 63 IN | OXYGEN SATURATION: 98 % | RESPIRATION RATE: 18 BRPM | HEART RATE: 68 BPM | WEIGHT: 160 LBS | DIASTOLIC BLOOD PRESSURE: 85 MMHG

## 2020-10-01 DIAGNOSIS — Z86.010 PERSONAL HISTORY OF COLONIC POLYPS: Primary | ICD-10-CM

## 2020-10-01 DIAGNOSIS — K63.5 COLON POLYPS: ICD-10-CM

## 2020-10-01 PROCEDURE — 37000008 HC ANESTHESIA 1ST 15 MINUTES: Mod: HCNC | Performed by: INTERNAL MEDICINE

## 2020-10-01 PROCEDURE — E9220 PRA ENDO ANESTHESIA: HCPCS | Mod: HCNC,,, | Performed by: NURSE ANESTHETIST, CERTIFIED REGISTERED

## 2020-10-01 PROCEDURE — 37000009 HC ANESTHESIA EA ADD 15 MINS: Mod: HCNC | Performed by: INTERNAL MEDICINE

## 2020-10-01 PROCEDURE — G0105 COLORECTAL SCRN; HI RISK IND: HCPCS | Mod: HCNC | Performed by: INTERNAL MEDICINE

## 2020-10-01 PROCEDURE — G0105 COLORECTAL SCRN; HI RISK IND: ICD-10-PCS | Mod: HCNC,,, | Performed by: INTERNAL MEDICINE

## 2020-10-01 PROCEDURE — E9220 PRA ENDO ANESTHESIA: ICD-10-PCS | Mod: HCNC,,, | Performed by: NURSE ANESTHETIST, CERTIFIED REGISTERED

## 2020-10-01 PROCEDURE — G0105 COLORECTAL SCRN; HI RISK IND: HCPCS | Mod: HCNC,,, | Performed by: INTERNAL MEDICINE

## 2020-10-01 PROCEDURE — 63600175 PHARM REV CODE 636 W HCPCS: Mod: HCNC | Performed by: NURSE ANESTHETIST, CERTIFIED REGISTERED

## 2020-10-01 PROCEDURE — 25000003 PHARM REV CODE 250: Mod: HCNC | Performed by: INTERNAL MEDICINE

## 2020-10-01 RX ORDER — LIDOCAINE HCL/PF 100 MG/5ML
SYRINGE (ML) INTRAVENOUS
Status: DISCONTINUED | OUTPATIENT
Start: 2020-10-01 | End: 2020-10-01

## 2020-10-01 RX ORDER — SODIUM CHLORIDE 9 MG/ML
INJECTION, SOLUTION INTRAVENOUS CONTINUOUS
Status: DISCONTINUED | OUTPATIENT
Start: 2020-10-01 | End: 2020-10-01 | Stop reason: HOSPADM

## 2020-10-01 RX ORDER — PROPOFOL 10 MG/ML
VIAL (ML) INTRAVENOUS
Status: DISCONTINUED | OUTPATIENT
Start: 2020-10-01 | End: 2020-10-01

## 2020-10-01 RX ORDER — PROPOFOL 10 MG/ML
VIAL (ML) INTRAVENOUS CONTINUOUS PRN
Status: DISCONTINUED | OUTPATIENT
Start: 2020-10-01 | End: 2020-10-01

## 2020-10-01 RX ADMIN — PROPOFOL 80 MG: 10 INJECTION, EMULSION INTRAVENOUS at 11:10

## 2020-10-01 RX ADMIN — SODIUM CHLORIDE: 0.9 INJECTION, SOLUTION INTRAVENOUS at 11:10

## 2020-10-01 RX ADMIN — PROPOFOL 150 MCG/KG/MIN: 10 INJECTION, EMULSION INTRAVENOUS at 11:10

## 2020-10-01 RX ADMIN — Medication 50 MG: at 11:10

## 2020-10-01 NOTE — ANESTHESIA PREPROCEDURE EVALUATION
10/01/2020  Zack Hopper is a 80 y.o., female.  Past Medical History:   Diagnosis Date    DJD (degenerative joint disease)     Hyperlipidemia     Hypertension     Nuclear sclerosis - Both Eyes 2012    Rosacea     Vitamin D deficiency disease      Past Surgical History:   Procedure Laterality Date    APPENDECTOMY      BREAST BIOPSY Left     2015    BREAST BIOPSY Right     10/2015 ex bx- papilloma     breast biopsy, left      10/2015     SECTION, CLASSIC      Three times    CHOLECYSTECTOMY      INJECTION OF ANESTHETIC AGENT INTO SACROILIAC JOINT Left 2018    Procedure: BLOCK, SACROILIAC JOINT- Left - ORAL SEDATION;  Surgeon: Ricci Lockett Jr., MD;  Location: [facility] PAIN MGT;  Service: Pain Management;  Laterality: Left;    INJECTION OF ANESTHETIC AGENT INTO SACROILIAC JOINT Left 2019    Procedure: BLOCK, SACROILIAC JOINT---Left W/Oral Sedation;  Surgeon: Ricci Lockett Jr., MD;  Location: [facility] PAIN MGT;  Service: Pain Management;  Laterality: Left;    JOINT REPLACEMENT Right     TKA    SHOULDER OPEN ROTATOR CUFF REPAIR Right     TOTAL KNEE ARTHROPLASTY      right knee         Anesthesia Evaluation    I have reviewed the Patient Summary Reports.   I have reviewed the NPO Status.   I have reviewed the Medications.     Review of Systems  Anesthesia Hx:   Denies Personal Hx of Anesthesia complications.   Social:  Non-Smoker    Cardiovascular:   Exercise tolerance: good Hypertension ECG has been reviewed. Pt reports has not been taking her prescribed antihypertensive but will start.  Reports home sbp ~130s   Hepatic/GI:   Bowel Prep. Denies GERD.    Musculoskeletal:   Arthritis     Neurological:   Neuromuscular Disease,        Physical Exam  General:  Well nourished    Airway/Jaw/Neck:  Airway Findings: Mouth Opening: Normal Tongue: Normal  General Airway  Assessment: Adult  Mallampati: II  TM Distance: Normal, at least 6 cm  Jaw/Neck Findings:  Neck ROM: Normal ROM  Neck Findings:     Eyes/Ears/Nose:  EYES/EARS/NOSE FINDINGS: Normal   Dental:  Dental Findings: In tact   Chest/Lungs:  Chest/Lungs Findings: Clear to auscultation, Normal Respiratory Rate     Heart/Vascular:  Heart Findings: Rate: Normal  Rhythm: Regular Rhythm  Sounds: Normal        Mental Status:  Mental Status Findings:  Cooperative, Alert and Oriented         Anesthesia Plan  Type of Anesthesia, risks & benefits discussed:  Anesthesia Type:  general  Patient's Preference: GA  Intra-op Monitoring Plan: standard ASA monitors  Intra-op Monitoring Plan Comments:   Post Op Pain Control Plan: IV/PO Opioids PRN  Post Op Pain Control Plan Comments:   Induction:   IV  Beta Blocker:  Patient is not currently on a Beta-Blocker (No further documentation required).       Informed Consent: Patient understands risks and agrees with Anesthesia plan.  Questions answered. Anesthesia consent signed with patient.  ASA Score: 2     Day of Surgery Review of History & Physical: I have interviewed and examined the patient. I have reviewed the patient's H&P dated:  There are no significant changes.  H&P update referred to the provider.         Ready For Surgery From Anesthesia Perspective.

## 2020-10-01 NOTE — H&P
Short Stay Endoscopy History and Physical    PCP - Maryann Brooks MD    Procedure - Colonoscopy  ASA - per anesthesia  Mallampati - per anesthesia  History of Anesthesia problems - no  Family history Anesthesia problems - no   Plan of anesthesia - General    HPI:  This is a 80 y.o. female here for evaluation of : personal history of colon polyps      ROS:  Constitutional: No fevers, chills, No weight loss  CV: No chest pain  Pulm: No cough, No shortness of breath  GI: see HPI  Derm: No rash    Medical History:  has a past medical history of DJD (degenerative joint disease), Hyperlipidemia, Hypertension, Nuclear sclerosis - Both Eyes (2012), Rosacea, and Vitamin D deficiency disease.    Surgical History:  has a past surgical history that includes  section, classic; Total knee arthroplasty; Appendectomy; Cholecystectomy; Shoulder open rotator cuff repair (Right); breast biopsy, left; Breast biopsy (Left); Breast biopsy (Right); Joint replacement (Right); Injection of anesthetic agent into sacroiliac joint (Left, 2018); and Injection of anesthetic agent into sacroiliac joint (Left, 2019).    Family History: family history includes Breast cancer (age of onset: 77) in her sister; Cataracts in her sister; Diabetes in her brother and maternal grandfather; Heart disease in her father; Hypertension in her mother and sister; Kidney disease in her brother; Kidney failure in her brother; Migraines in her sister; No Known Problems in her son, son, and son; Other in her brother; Stroke in her father.. Otherwise no colon cancer, inflammatory bowel disease, or GI malignancies.    Social History:  reports that she has never smoked. She has never used smokeless tobacco. She reports current alcohol use of about 1.0 standard drinks of alcohol per week. She reports that she does not use drugs.    Review of patient's allergies indicates:   Allergen Reactions    Sulfacetamide      Other reaction(s):  Unknown       Medications:   Medications Prior to Admission   Medication Sig Dispense Refill Last Dose    acetaminophen (TYLENOL) 650 MG TbSR Take 1 tablet (650 mg total) by mouth every 8 (eight) hours.  0 Past Week at Unknown time    rosuvastatin (CRESTOR) 10 MG tablet Take 1 tablet (10 mg total) by mouth once daily. 90 tablet 3 Past Week at Unknown time    vitamin D 1000 units Tab Take 1,000 Units by mouth once daily.   Past Month at Unknown time    celecoxib (CELEBREX) 100 MG capsule Take 1 capsule (100 mg total) by mouth 2 (two) times daily. (Patient not taking: Reported on 1/17/2020) 60 capsule 5     chlorthalidone (HYGROTEN) 25 MG Tab Take 1 tablet (25 mg total) by mouth once daily. (Patient not taking: Reported on 7/24/2020) 90 tablet 3 Unknown at Unknown time    traZODone (DESYREL) 100 MG tablet Take 1 tablet (100 mg total) by mouth every evening. (Patient not taking: Reported on 7/24/2020) 30 tablet 2 Unknown at Unknown time         Physical Exam:    Vital Signs:   Vitals:    10/01/20 1138   BP: (!) 159/109   Pulse: 76   Resp: 18   Temp: 98.2 °F (36.8 °C)       General Appearance: Well appearing in no acute distress  Eyes:    No scleral icterus  ENT: Neck supple, Lips, mucosa, and tongue normal; teeth and gums normal  Lungs: CTA bilaterally  Heart:  S1, S2 normal, no murmurs heard  Abdomen: Soft, non tender, non distended with positive bowel sounds. No hepatosplenomegaly, ascites, or mass.  Extremities: 2+ pulses, no clubbing, cyanosis or edema  Skin: No rash      Labs:  Lab Results   Component Value Date    WBC 6.71 08/01/2020    HGB 11.6 (L) 08/01/2020    HCT 36.7 (L) 08/01/2020     08/01/2020    CHOL 231 (H) 07/13/2020    TRIG 162 (H) 07/13/2020    HDL 49 07/13/2020    ALT 17 08/01/2020    AST 15 08/01/2020     08/01/2020    K 3.5 08/01/2020     08/01/2020    CREATININE 0.8 08/01/2020    BUN 12 08/01/2020    CO2 21 (L) 08/01/2020    TSH 2.177 01/10/2020    HGBA1C 5.6 07/13/2020        I have explained the risks and benefits of endoscopy procedures to the patient including but not limited to bleeding, perforation, infection, and death.  The patient was asked if they understand and allowed to ask any further questions to their satisfaction.    Darek Martínez MD

## 2020-10-01 NOTE — [employer] PATIENT INSTRUCTIONS
Discharge Summary/Instructions after an Endoscopic Procedure  Patient Name: Zack Hopper  Patient MRN: [id number]  Patient YOB: 1940 Thursday, October 1, 2020  Darek Martínez MD  RESTRICTIONS:  During your procedure today, you received medications for sedation.  These   medications may affect your judgment, balance and coordination.  Therefore,   for 24 hours, you have the following restrictions:   - DO NOT drive a car, operate machinery, make legal/financial decisions,   sign important papers or drink alcohol.    ACTIVITY:  Today: no heavy lifting, straining or running due to procedural   sedation/anesthesia.  The following day: return to full activity including work.  DIET:  Eat and drink normally unless instructed otherwise.     TREATMENT FOR COMMON SIDE EFFECTS:  - Mild abdominal pain, nausea, belching, bloating or excessive gas:  rest,   eat lightly and use a heating pad.  - Sore Throat: treat with throat lozenges and/or gargle with warm salt   water.  - Because air was used during the procedure, expelling large amounts of air   from your rectum or belching is normal.  - If a bowel prep was taken, you may not have a bowel movement for 1-3 days.    This is normal.  SYMPTOMS TO WATCH FOR AND REPORT TO YOUR PHYSICIAN:  1. Abdominal pain or bloating, other than gas cramps.  2. Chest pain.  3. Back pain.  4. Signs of infection such as: chills or fever occurring within 24 hours   after the procedure.  5. Rectal bleeding, which would show as bright red, maroon, or black stools.   (A tablespoon of blood from the rectum is not serious, especially if   hemorrhoids are present.)  6. Vomiting.  7. Weakness or dizziness.  GO DIRECTLY TO THE NEAREST EMERGENCY ROOM IF YOU HAVE ANY OF THE FOLLOWING:      Difficulty breathing              Chills and/or fever over 101 F   Persistent vomiting and/or vomiting blood   Severe abdominal pain   Severe chest pain   Black, tarry stools   Bleeding- more than one tablespoon   Any  other symptom or condition that you feel may need urgent attention  Your doctor recommends these additional instructions:  If any biopsies were taken, your doctors clinic will contact you in 1 to 2   weeks with any results.  - Patient has a contact number available for emergencies.  The signs and   symptoms of potential delayed complications were discussed with the   patient.  Return to normal activities tomorrow.  Written discharge   instructions were provided to the patient.   - Discharge patient to home.   - Repeat colonoscopy is not recommended due to current age (66 years or   older) for surveillance.   - The findings and recommendations were discussed with the designated   responsible adult.   For questions, problems or results please call your physician - Darek Martínez MD at Work:  [phone].  [facility], EMERGENCY ROOM PHONE NUMBER: [phone]  IF A COMPLICATION OR EMERGENCY SITUATION ARISES AND YOU ARE UNABLE TO REACH   YOUR PHYSICIAN - GO DIRECTLY TO THE EMERGENCY ROOM.  Darek Martínez MD  10/1/2020 12:07:54 PM  This report has been verified and signed electronically.  [employer]

## 2020-10-01 NOTE — PROGRESS NOTES
Patient blood pressure after being in recovery 30 min 185/81 running in the 140's-150's systolic prior to this reading. Dr. Tejeda notified of this. MD stated for patient to take her evening dose of her blood pressure medication. Patient was given this instruction before being discharged.

## 2020-10-01 NOTE — DISCHARGE INSTRUCTIONS
Diverticulosis    Diverticulosis means that small pouches have formed in the wall of your large intestine (colon). Most often, this problem causes no symptoms and is common as people age. But the pouches in the colon are at risk of becoming infected. When this happens, the condition is called diverticulitis. Although most people with diverticulosis never develop diverticulitis, it is still not uncommon. Rectal bleeding can also occur and in less common situations, a type of colon inflammation called colitis.  While most people do not have symptoms, some people with diverticulosis may have:  · Abdominal cramps and pain  · Bloating  · Constipation  · Change in bowel habits  Causes  The exact cause of diverticulosis (and diverticulitis) has not been proved, but a few things are associated with the condition:  · Low-fiber diet  · Constipation  · Lack of exercise  Your healthcare provider will talk with you about how to manage your condition. Diet changes may be all that are needed to help control diverticulosis and prevent progression to diverticulitis. If you develop diverticulitis, you will likely need other treatments.  Home care  You may be told to take fiber supplements daily. Fiber adds bulk to the stool so that it passes through the colon more easily. Stool softeners may be recommended. You may also be given medications for pain relief. Be sure to take all medications as directed.  In the past, people were told to avoid corn, nuts, and seeds. This is no longer necessary.  Follow these guidelines when caring for yourself at home:  · Eat unprocessed foods that are high in fiber. Whole grains, fruits, and vegetables are good choices.  · Drink 6 to 8 glasses of water every day unless your healthcare provider has you limit how much fluid you should have.  · Watch for changes in your bowel movements. Tell your provider if you notice any changes.  · Begin an exercise program. Ask your provider how to get started.  Generally, walking is the best.  · Get plenty of rest and sleep.  Follow-up care  Follow up with your healthcare provider, or as advised. Regular visits may be needed to check on your health. Sometimes special procedures such as colonoscopy, are needed after an episode of diverticulitis or blooding. Be sure to keep all your appointments.  If a stool sample was taken, or cultures were done, you should be told if they are positive, or if your treatment needs to be changed. You can call as directed for the results.  If X-rays were done, a radiologist will look at them. You will be told if there is a change in your treatment.  If antibiotics were prescribed, be sure to finish them all.  When to seek medical advice  Call your healthcare provider right away if any of these occur:  · Fever of 100.4°F (38°C) or higher, or as directed by your healthcare provider  · Severe cramps in the lower left side of the abdomen or pain that is getting worse  · Tenderness in the lower left side of the abdomen or worsening pain throughout the abdomen  · Diarrhea or constipation that doesn't get better within 24 hours  · Nausea and vomiting  · Bleeding from the rectum  Call 911  Call emergency services if any of the following occur:  · Trouble breathing  · Confusion  · Very drowsy or trouble awakening  · Fainting or loss of consciousness  · Rapid heart rate  · Chest pain  Date Last Reviewed: 12/30/2015 © 2000-2017 [employer]. [address] [place] [id number]. All rights reserved. This information is not intended as a substitute for professional medical care. Always follow your healthcare professional's instructions.              Colorectal Cancer Screening    Colorectal cancer (cancer in the colon or rectum) is a leading cause of cancer deaths in the [place] But it doesnt have to be. When this cancer is found and removed early, the chances of a full recovery are very good. Because colorectal cancer rarely causes  symptoms in its early stages, screening for the disease is important. Its even more crucial if you have risk factors for the disease. Learn more about colorectal cancer and its risk factors. Then talk to your healthcare provider about being screened. You could be saving your own life.  Risk factors for colorectal cancer  Your risk of having colorectal cancer increases if you:  · Are 50 years of age or older  · Have a family history or personal history of colorectal cancer or polyps  · Have a personal history of type 2 diabetes, Crohns disease, or ulcerative colitis  · Have an inherited genetic syndrome like Worley syndrome (also known as HNPCC) or familial adenomatous polyposis (FAP)  · Are very overweight  · Are not physically active  · Smoke  · Drink a lot of alcohol  · Eat a lot of red or processed meat  The colon and rectum  Waste from food you eat enters the colon from the small intestine. As it travels through the colon, the waste (stool) loses water and becomes more solid. Intestinal muscles push it toward the sigmoid--the last section of the colon. Stool then moves into the rectum, where its stored until its ready to leave the body during a bowel movement.  How cancer develops  Polyps are growths that form on the inner lining of the colon or rectum. Most are benign, which means they arent cancerous. But over time, some polyps can become cancer (malignant). This happens when cells in these polyps begin growing abnormally. In time, malignant cells invade more and more of the colon and rectum. The cancer may also spread to nearby organs or lymph nodes or to other parts of the body. Finding and removing polyps can help prevent cancer from ever forming.  Your screening  Screening means looking for a health problem before you have symptoms. During screening for colorectal cancer, your healthcare provider will ask about your health history, examine you, and do one or more tests.  History and exam  The history  and exam involve the following:  · Health history. Your healthcare provider will ask about your health history. Mention if a family member has had colon cancer or polyps. Also mention any health problems you have had in the past.  · Digital rectal exam (ROBERT). During a ROBERT, the healthcare provider inserts a lubricated gloved finger into the rectum. The test is painless and takes less than a minute. Healthcare providers agree that this test alone is not enough to screen for colorectal cancer.  Screening test choices  Fecal occult blood test (FOBT) or fecal immunochemical test (FIT)  These tests check for occult blood in stool (blood you cant see). Hidden blood may be a sign of colon polyps or cancer. A small sample of stool is tested for blood in a laboratory. Most often, you collect this sample at home using a kit your healthcare provider gives you. Follow the instructions carefully for using this kit. You might need to avoid certain foods and medicines before the test, as directed.  Barium enema with contrast (double-contrast barium enema)  This test uses X-rays to provide images of the entire colon and rectum. The day before this test, you will need to do a bowel prep to clean out the colon and rectum. A bowel prep is a liquid diet plus strong laxatives or enemas. You will be awake for the test, but you may be given medicine to help you relax. At the start of the test, a radiologist (a healthcare provider who specializes in imaging tests) places a soft tube into the rectum. The tube is used to fill the colon with a contrast liquid (barium) and air. This can be uncomfortable for some people. The liquid helps the colon show up clearly on the X-rays. Because the test uses X-rays, it exposes you to a small amount of radiation.  Virtual colonoscopy  This exam is also called a CT colonography. It uses a series of X-ray photographs to create a 3-D view of the colon and rectum. The day before the test, you will need to do  a bowel prep to clean out your colon. Your healthcare provider will give you instructions on how to do this. During the procedure, you will lie on a table that is part of a special X-ray machine called a CT scanner. A small tube will be placed into your rectum to fill the colon and rectum with air. This can be uncomfortable for some people. Then, the table will move into the machine and pictures will be taken of your colon and rectum. A computer will combine these photos to create a 3-D picture. Because the test uses X-rays, it exposes you to a small amount of radiation.  Scope exams  Here are two types of scope exams:  · Colonoscopy. This test can be used to find and remove polyps anywhere in the colon or rectum. The day before the test, you will do a bowel prep. This is a liquid diet plus a strong laxative solution or an enema. The bowel prep will cleanse your colon. You will be given instructions for this. Just before the test, you are given a medicine to make you sleepy. Then, a long, flexible, lighted tube called a colonoscope is gently inserted into the rectum and guided through the entire colon. Images of the colon are viewed on a video screen. Any polyps that are found are removed and sent to a lab for testing. If a polyp cant be removed, a sample of tissue is taken and the polyp might be removed later during surgery. You will need to bring someone with you to drive you home after this test.   · Sigmoidoscopy. This test is similar to colonoscopy, but focuses only on the sigmoid colon and rectum. As with colonoscopy, bowel prep must be done the day before this test. It might not need to be as complete as the bowel prep for a colonoscopy. You are awake during the procedure, but you may be given medicine to help you relax. During the test, the healthcare provider guides a thin, flexible, lighted tube called a sigmoidoscope through your rectum and lower colon. The images are displayed on a video screen. Polyps  are removed, if possible, and sent to a lab for testing.  Colonoscopy is the only screening test that lets your healthcare provider see the entire colon and rectum. This test also lets your healthcare provider remove any pieces of tissue that need to be looked at by a lab. If something suspicious is found using any other tests, you will likely need a colonoscopy.     When to call your healthcare provider after a test  Call your healthcare provider if you have any of the following after any screening test:  · Bleeding  · Fever of 100.4°F (38°C) or higher, or as directed by your healthcare provider  · Abdominal pain  · Vomiting   Date Last Reviewed: 11/4/2015 © 2000-2017 [employer]. [address] [place] [address], [place] [address]. All rights reserved. This information is not intended as a substitute for professional medical care. Always follow your healthcare professional's instructions.

## 2020-10-01 NOTE — TRANSFER OF CARE
"Anesthesia Transfer of Care Note    Patient: Zack Hopper    Procedure(s) Performed: Procedure(s) (LRB):  COLONOSCOPY (N/A)    Patient location: GI    Anesthesia Type: general    Transport from OR: Transported from OR on room air with adequate spontaneous ventilation    Post pain: adequate analgesia    Post assessment: no apparent anesthetic complications and tolerated procedure well    Post vital signs: stable    Level of consciousness: awake and alert    Nausea/Vomiting: no nausea/vomiting    Complications: none    Transfer of care protocol was followed      Last vitals:   Visit Vitals  BP (!) 159/109 (BP Location: Left arm, Patient Position: Sitting)   Pulse 76   Temp 36.8 °C (98.2 °F) (Temporal)   Resp 18   Ht 5' 3" (1.6 m)   Wt 72.6 kg (160 lb)   SpO2 96%   Breastfeeding No   BMI 28.34 kg/m²     "

## 2020-10-12 ENCOUNTER — PES CALL (OUTPATIENT)
Dept: ADMINISTRATIVE | Facility: CLINIC | Age: 80
End: 2020-10-12

## 2020-11-12 ENCOUNTER — PES CALL (OUTPATIENT)
Dept: ADMINISTRATIVE | Facility: CLINIC | Age: 80
End: 2020-11-12

## 2020-12-11 ENCOUNTER — PATIENT MESSAGE (OUTPATIENT)
Dept: OTHER | Facility: OTHER | Age: 80
End: 2020-12-11

## 2021-01-20 ENCOUNTER — IMMUNIZATION (OUTPATIENT)
Dept: FAMILY MEDICINE | Facility: CLINIC | Age: 81
End: 2021-01-20
Payer: MEDICARE

## 2021-01-20 DIAGNOSIS — Z23 NEED FOR VACCINATION: Primary | ICD-10-CM

## 2021-01-20 PROCEDURE — 91300 COVID-19, MRNA, LNP-S, PF, 30 MCG/0.3 ML DOSE VACCINE: CPT | Mod: PBBFAC | Performed by: FAMILY MEDICINE

## 2021-01-25 ENCOUNTER — LAB VISIT (OUTPATIENT)
Dept: LAB | Facility: HOSPITAL | Age: 81
End: 2021-01-25
Attending: INTERNAL MEDICINE
Payer: MEDICARE

## 2021-01-25 ENCOUNTER — OFFICE VISIT (OUTPATIENT)
Dept: INTERNAL MEDICINE | Facility: CLINIC | Age: 81
End: 2021-01-25
Payer: MEDICARE

## 2021-01-25 VITALS
SYSTOLIC BLOOD PRESSURE: 112 MMHG | HEIGHT: 63 IN | OXYGEN SATURATION: 97 % | DIASTOLIC BLOOD PRESSURE: 72 MMHG | WEIGHT: 167.56 LBS | RESPIRATION RATE: 16 BRPM | BODY MASS INDEX: 29.69 KG/M2 | HEART RATE: 78 BPM | TEMPERATURE: 98 F

## 2021-01-25 DIAGNOSIS — I70.0 AORTIC ATHEROSCLEROSIS: ICD-10-CM

## 2021-01-25 DIAGNOSIS — M79.671 RIGHT FOOT PAIN: ICD-10-CM

## 2021-01-25 DIAGNOSIS — M46.96 UNSPECIFIED INFLAMMATORY SPONDYLOPATHY, LUMBAR REGION: ICD-10-CM

## 2021-01-25 DIAGNOSIS — R73.01 IMPAIRED FASTING GLUCOSE: ICD-10-CM

## 2021-01-25 DIAGNOSIS — D64.9 ANEMIA, UNSPECIFIED TYPE: ICD-10-CM

## 2021-01-25 DIAGNOSIS — E55.9 VITAMIN D DEFICIENCY: ICD-10-CM

## 2021-01-25 DIAGNOSIS — E78.5 HYPERLIPIDEMIA, UNSPECIFIED HYPERLIPIDEMIA TYPE: ICD-10-CM

## 2021-01-25 DIAGNOSIS — I10 HYPERTENSION, UNSPECIFIED TYPE: Primary | ICD-10-CM

## 2021-01-25 DIAGNOSIS — Z11.59 NEED FOR HEPATITIS C SCREENING TEST: ICD-10-CM

## 2021-01-25 DIAGNOSIS — N18.2 CKD (CHRONIC KIDNEY DISEASE) STAGE 2, GFR 60-89 ML/MIN: ICD-10-CM

## 2021-01-25 DIAGNOSIS — G47.00 INSOMNIA, UNSPECIFIED TYPE: ICD-10-CM

## 2021-01-25 LAB
25(OH)D3+25(OH)D2 SERPL-MCNC: 53 NG/ML (ref 30–96)
ALBUMIN SERPL BCP-MCNC: 4.6 G/DL (ref 3.5–5.2)
ALP SERPL-CCNC: 59 U/L (ref 55–135)
ALT SERPL W/O P-5'-P-CCNC: 34 U/L (ref 10–44)
ANION GAP SERPL CALC-SCNC: 10 MMOL/L (ref 8–16)
AST SERPL-CCNC: 27 U/L (ref 10–40)
BASOPHILS # BLD AUTO: 0.08 K/UL (ref 0–0.2)
BASOPHILS NFR BLD: 1.2 % (ref 0–1.9)
BILIRUB SERPL-MCNC: 1.4 MG/DL (ref 0.1–1)
BUN SERPL-MCNC: 23 MG/DL (ref 8–23)
CALCIUM SERPL-MCNC: 9.9 MG/DL (ref 8.7–10.5)
CHLORIDE SERPL-SCNC: 100 MMOL/L (ref 95–110)
CHOLEST SERPL-MCNC: 156 MG/DL (ref 120–199)
CHOLEST/HDLC SERPL: 2.9 {RATIO} (ref 2–5)
CO2 SERPL-SCNC: 31 MMOL/L (ref 23–29)
CREAT SERPL-MCNC: 0.8 MG/DL (ref 0.5–1.4)
DIFFERENTIAL METHOD: ABNORMAL
EOSINOPHIL # BLD AUTO: 0.6 K/UL (ref 0–0.5)
EOSINOPHIL NFR BLD: 8.7 % (ref 0–8)
ERYTHROCYTE [DISTWIDTH] IN BLOOD BY AUTOMATED COUNT: 12.8 % (ref 11.5–14.5)
EST. GFR  (AFRICAN AMERICAN): >60 ML/MIN/1.73 M^2
EST. GFR  (NON AFRICAN AMERICAN): >60 ML/MIN/1.73 M^2
ESTIMATED AVG GLUCOSE: 123 MG/DL (ref 68–131)
GLUCOSE SERPL-MCNC: 122 MG/DL (ref 70–110)
HBA1C MFR BLD HPLC: 5.9 % (ref 4–5.6)
HCT VFR BLD AUTO: 43.4 % (ref 37–48.5)
HCV AB SERPL QL IA: NEGATIVE
HDLC SERPL-MCNC: 53 MG/DL (ref 40–75)
HDLC SERPL: 34 % (ref 20–50)
HGB BLD-MCNC: 13.9 G/DL (ref 12–16)
IMM GRANULOCYTES # BLD AUTO: 0.01 K/UL (ref 0–0.04)
IMM GRANULOCYTES NFR BLD AUTO: 0.2 % (ref 0–0.5)
IRON SERPL-MCNC: 124 UG/DL (ref 30–160)
LDLC SERPL CALC-MCNC: 69.8 MG/DL (ref 63–159)
LYMPHOCYTES # BLD AUTO: 1.7 K/UL (ref 1–4.8)
LYMPHOCYTES NFR BLD: 26.9 % (ref 18–48)
MCH RBC QN AUTO: 31.4 PG (ref 27–31)
MCHC RBC AUTO-ENTMCNC: 32 G/DL (ref 32–36)
MCV RBC AUTO: 98 FL (ref 82–98)
MONOCYTES # BLD AUTO: 0.6 K/UL (ref 0.3–1)
MONOCYTES NFR BLD: 9 % (ref 4–15)
NEUTROPHILS # BLD AUTO: 3.5 K/UL (ref 1.8–7.7)
NEUTROPHILS NFR BLD: 54 % (ref 38–73)
NONHDLC SERPL-MCNC: 103 MG/DL
NRBC BLD-RTO: 0 /100 WBC
PLATELET # BLD AUTO: 196 K/UL (ref 150–350)
PMV BLD AUTO: 11 FL (ref 9.2–12.9)
POTASSIUM SERPL-SCNC: 3.6 MMOL/L (ref 3.5–5.1)
PROT SERPL-MCNC: 7.9 G/DL (ref 6–8.4)
RBC # BLD AUTO: 4.42 M/UL (ref 4–5.4)
SATURATED IRON: 28 % (ref 20–50)
SODIUM SERPL-SCNC: 141 MMOL/L (ref 136–145)
TOTAL IRON BINDING CAPACITY: 445 UG/DL (ref 250–450)
TRANSFERRIN SERPL-MCNC: 301 MG/DL (ref 200–375)
TRIGL SERPL-MCNC: 166 MG/DL (ref 30–150)
WBC # BLD AUTO: 6.47 K/UL (ref 3.9–12.7)

## 2021-01-25 PROCEDURE — 1159F MED LIST DOCD IN RCRD: CPT | Mod: S$GLB,,, | Performed by: INTERNAL MEDICINE

## 2021-01-25 PROCEDURE — 3078F PR MOST RECENT DIASTOLIC BLOOD PRESSURE < 80 MM HG: ICD-10-PCS | Mod: CPTII,S$GLB,, | Performed by: INTERNAL MEDICINE

## 2021-01-25 PROCEDURE — 1126F AMNT PAIN NOTED NONE PRSNT: CPT | Mod: S$GLB,,, | Performed by: INTERNAL MEDICINE

## 2021-01-25 PROCEDURE — 86803 HEPATITIS C AB TEST: CPT

## 2021-01-25 PROCEDURE — 3074F SYST BP LT 130 MM HG: CPT | Mod: CPTII,S$GLB,, | Performed by: INTERNAL MEDICINE

## 2021-01-25 PROCEDURE — 80053 COMPREHEN METABOLIC PANEL: CPT

## 2021-01-25 PROCEDURE — 1126F PR PAIN SEVERITY QUANTIFIED, NO PAIN PRESENT: ICD-10-PCS | Mod: S$GLB,,, | Performed by: INTERNAL MEDICINE

## 2021-01-25 PROCEDURE — 99999 PR PBB SHADOW E&M-EST. PATIENT-LVL V: CPT | Mod: PBBFAC,,, | Performed by: INTERNAL MEDICINE

## 2021-01-25 PROCEDURE — 83540 ASSAY OF IRON: CPT

## 2021-01-25 PROCEDURE — 99214 OFFICE O/P EST MOD 30 MIN: CPT | Mod: S$GLB,,, | Performed by: INTERNAL MEDICINE

## 2021-01-25 PROCEDURE — 3074F PR MOST RECENT SYSTOLIC BLOOD PRESSURE < 130 MM HG: ICD-10-PCS | Mod: CPTII,S$GLB,, | Performed by: INTERNAL MEDICINE

## 2021-01-25 PROCEDURE — 3288F FALL RISK ASSESSMENT DOCD: CPT | Mod: CPTII,S$GLB,, | Performed by: INTERNAL MEDICINE

## 2021-01-25 PROCEDURE — 99999 PR PBB SHADOW E&M-EST. PATIENT-LVL V: ICD-10-PCS | Mod: PBBFAC,,, | Performed by: INTERNAL MEDICINE

## 2021-01-25 PROCEDURE — 80061 LIPID PANEL: CPT

## 2021-01-25 PROCEDURE — 1101F PT FALLS ASSESS-DOCD LE1/YR: CPT | Mod: CPTII,S$GLB,, | Performed by: INTERNAL MEDICINE

## 2021-01-25 PROCEDURE — 36415 COLL VENOUS BLD VENIPUNCTURE: CPT | Mod: PO

## 2021-01-25 PROCEDURE — 1159F PR MEDICATION LIST DOCUMENTED IN MEDICAL RECORD: ICD-10-PCS | Mod: S$GLB,,, | Performed by: INTERNAL MEDICINE

## 2021-01-25 PROCEDURE — 3288F PR FALLS RISK ASSESSMENT DOCUMENTED: ICD-10-PCS | Mod: CPTII,S$GLB,, | Performed by: INTERNAL MEDICINE

## 2021-01-25 PROCEDURE — 85025 COMPLETE CBC W/AUTO DIFF WBC: CPT

## 2021-01-25 PROCEDURE — 99214 PR OFFICE/OUTPT VISIT, EST, LEVL IV, 30-39 MIN: ICD-10-PCS | Mod: S$GLB,,, | Performed by: INTERNAL MEDICINE

## 2021-01-25 PROCEDURE — 82306 VITAMIN D 25 HYDROXY: CPT

## 2021-01-25 PROCEDURE — 3078F DIAST BP <80 MM HG: CPT | Mod: CPTII,S$GLB,, | Performed by: INTERNAL MEDICINE

## 2021-01-25 PROCEDURE — 1101F PR PT FALLS ASSESS DOC 0-1 FALLS W/OUT INJ PAST YR: ICD-10-PCS | Mod: CPTII,S$GLB,, | Performed by: INTERNAL MEDICINE

## 2021-01-25 PROCEDURE — 83036 HEMOGLOBIN GLYCOSYLATED A1C: CPT

## 2021-01-25 RX ORDER — CHLORTHALIDONE 25 MG/1
25 TABLET ORAL DAILY
Qty: 90 TABLET | Refills: 3 | Status: SHIPPED | OUTPATIENT
Start: 2021-01-25 | End: 2022-01-27

## 2021-01-26 ENCOUNTER — TELEPHONE (OUTPATIENT)
Dept: INTERNAL MEDICINE | Facility: CLINIC | Age: 81
End: 2021-01-26

## 2021-01-28 ENCOUNTER — CLINICAL SUPPORT (OUTPATIENT)
Dept: URGENT CARE | Facility: CLINIC | Age: 81
End: 2021-01-28
Payer: MEDICARE

## 2021-01-28 DIAGNOSIS — Z11.59 ENCOUNTER FOR SCREENING FOR OTHER VIRAL DISEASES: Primary | ICD-10-CM

## 2021-01-28 LAB
CTP QC/QA: YES
SARS-COV-2 RDRP RESP QL NAA+PROBE: NEGATIVE

## 2021-01-28 PROCEDURE — U0002: ICD-10-PCS | Mod: QW,S$GLB,, | Performed by: NURSE PRACTITIONER

## 2021-01-28 PROCEDURE — U0002 COVID-19 LAB TEST NON-CDC: HCPCS | Mod: QW,S$GLB,, | Performed by: NURSE PRACTITIONER

## 2021-01-29 ENCOUNTER — OFFICE VISIT (OUTPATIENT)
Dept: OPTOMETRY | Facility: CLINIC | Age: 81
End: 2021-01-29
Payer: COMMERCIAL

## 2021-01-29 DIAGNOSIS — H52.03 HYPEROPIA WITH PRESBYOPIA OF BOTH EYES: Primary | ICD-10-CM

## 2021-01-29 DIAGNOSIS — H52.4 PRESBYOPIA: ICD-10-CM

## 2021-01-29 DIAGNOSIS — H52.4 HYPEROPIA WITH PRESBYOPIA OF BOTH EYES: Primary | ICD-10-CM

## 2021-01-29 DIAGNOSIS — Z13.5 GLAUCOMA SCREENING: ICD-10-CM

## 2021-01-29 PROCEDURE — 92014 PR EYE EXAM, EST PATIENT,COMPREHESV: ICD-10-PCS | Mod: S$GLB,,, | Performed by: OPTOMETRIST

## 2021-01-29 PROCEDURE — 99999 PR PBB SHADOW E&M-EST. PATIENT-LVL II: ICD-10-PCS | Mod: PBBFAC,,, | Performed by: OPTOMETRIST

## 2021-01-29 PROCEDURE — 99999 PR PBB SHADOW E&M-EST. PATIENT-LVL II: CPT | Mod: PBBFAC,,, | Performed by: OPTOMETRIST

## 2021-01-29 PROCEDURE — 92015 DETERMINE REFRACTIVE STATE: CPT | Mod: S$GLB,,, | Performed by: OPTOMETRIST

## 2021-01-29 PROCEDURE — 92014 COMPRE OPH EXAM EST PT 1/>: CPT | Mod: S$GLB,,, | Performed by: OPTOMETRIST

## 2021-01-29 PROCEDURE — 92015 PR REFRACTION: ICD-10-PCS | Mod: S$GLB,,, | Performed by: OPTOMETRIST

## 2021-01-30 ENCOUNTER — CLINICAL SUPPORT (OUTPATIENT)
Dept: URGENT CARE | Facility: CLINIC | Age: 81
End: 2021-01-30
Payer: MEDICARE

## 2021-01-30 DIAGNOSIS — Z11.59 ENCOUNTER FOR SCREENING FOR OTHER VIRAL DISEASES: Primary | ICD-10-CM

## 2021-01-30 LAB
CTP QC/QA: YES
SARS-COV-2 RDRP RESP QL NAA+PROBE: NEGATIVE

## 2021-01-30 PROCEDURE — U0002: ICD-10-PCS | Mod: QW,S$GLB,, | Performed by: FAMILY MEDICINE

## 2021-01-30 PROCEDURE — U0002 COVID-19 LAB TEST NON-CDC: HCPCS | Mod: QW,S$GLB,, | Performed by: FAMILY MEDICINE

## 2021-02-02 ENCOUNTER — HOSPITAL ENCOUNTER (OUTPATIENT)
Dept: RADIOLOGY | Facility: HOSPITAL | Age: 81
Discharge: HOME | End: 2021-02-02
Attending: ORTHOPAEDIC SURGERY
Payer: MEDICARE

## 2021-02-02 ENCOUNTER — OFFICE VISIT (OUTPATIENT)
Dept: ORTHOPEDICS | Facility: CLINIC | Age: 81
End: 2021-02-02
Payer: MEDICARE

## 2021-02-02 DIAGNOSIS — R52 PAIN: ICD-10-CM

## 2021-02-02 DIAGNOSIS — R52 PAIN: Primary | ICD-10-CM

## 2021-02-02 DIAGNOSIS — M19.071 DEGENERATIVE JOINT DISEASE OF HINDFOOT, RIGHT: Primary | ICD-10-CM

## 2021-02-02 DIAGNOSIS — M21.6X1 ACQUIRED PRONATION DEFORMITY OF RIGHT FOOT: ICD-10-CM

## 2021-02-02 PROCEDURE — 73630 X-RAY EXAM OF FOOT: CPT | Mod: TC,PO,RT

## 2021-02-02 PROCEDURE — 99213 OFFICE O/P EST LOW 20 MIN: CPT | Mod: S$GLB,,, | Performed by: ORTHOPAEDIC SURGERY

## 2021-02-02 PROCEDURE — 73630 XR FOOT COMPLETE 3 VIEW RIGHT: ICD-10-PCS | Mod: 26,RT,, | Performed by: RADIOLOGY

## 2021-02-02 PROCEDURE — 1159F MED LIST DOCD IN RCRD: CPT | Mod: S$GLB,,, | Performed by: ORTHOPAEDIC SURGERY

## 2021-02-02 PROCEDURE — 1126F AMNT PAIN NOTED NONE PRSNT: CPT | Mod: S$GLB,,, | Performed by: ORTHOPAEDIC SURGERY

## 2021-02-02 PROCEDURE — 73630 X-RAY EXAM OF FOOT: CPT | Mod: 26,RT,, | Performed by: RADIOLOGY

## 2021-02-02 PROCEDURE — 1159F PR MEDICATION LIST DOCUMENTED IN MEDICAL RECORD: ICD-10-PCS | Mod: S$GLB,,, | Performed by: ORTHOPAEDIC SURGERY

## 2021-02-02 PROCEDURE — 99213 PR OFFICE/OUTPT VISIT, EST, LEVL III, 20-29 MIN: ICD-10-PCS | Mod: S$GLB,,, | Performed by: ORTHOPAEDIC SURGERY

## 2021-02-02 PROCEDURE — 1126F PR PAIN SEVERITY QUANTIFIED, NO PAIN PRESENT: ICD-10-PCS | Mod: S$GLB,,, | Performed by: ORTHOPAEDIC SURGERY

## 2021-02-02 PROCEDURE — 99999 PR PBB SHADOW E&M-EST. PATIENT-LVL II: ICD-10-PCS | Mod: PBBFAC,,, | Performed by: ORTHOPAEDIC SURGERY

## 2021-02-02 PROCEDURE — 99999 PR PBB SHADOW E&M-EST. PATIENT-LVL II: CPT | Mod: PBBFAC,,, | Performed by: ORTHOPAEDIC SURGERY

## 2021-02-08 ENCOUNTER — PES CALL (OUTPATIENT)
Dept: ADMINISTRATIVE | Facility: CLINIC | Age: 81
End: 2021-02-08

## 2021-02-08 ENCOUNTER — CLINICAL SUPPORT (OUTPATIENT)
Dept: REHABILITATION | Facility: HOSPITAL | Age: 81
End: 2021-02-08
Attending: INTERNAL MEDICINE
Payer: MEDICARE

## 2021-02-08 DIAGNOSIS — M54.50 RIGHT-SIDED LOW BACK PAIN WITHOUT SCIATICA, UNSPECIFIED CHRONICITY: ICD-10-CM

## 2021-02-08 DIAGNOSIS — M21.6X1 ACQUIRED PRONATION DEFORMITY OF RIGHT FOOT: ICD-10-CM

## 2021-02-08 DIAGNOSIS — M46.96 UNSPECIFIED INFLAMMATORY SPONDYLOPATHY, LUMBAR REGION: ICD-10-CM

## 2021-02-08 DIAGNOSIS — R29.898 DECREASED STRENGTH OF LOWER EXTREMITY: ICD-10-CM

## 2021-02-08 PROCEDURE — 97110 THERAPEUTIC EXERCISES: CPT | Mod: PO

## 2021-02-08 PROCEDURE — 97162 PT EVAL MOD COMPLEX 30 MIN: CPT | Mod: PO

## 2021-02-10 ENCOUNTER — IMMUNIZATION (OUTPATIENT)
Dept: FAMILY MEDICINE | Facility: CLINIC | Age: 81
End: 2021-02-10
Payer: MEDICARE

## 2021-02-10 DIAGNOSIS — Z23 NEED FOR VACCINATION: Primary | ICD-10-CM

## 2021-02-10 PROCEDURE — 0002A COVID-19, MRNA, LNP-S, PF, 30 MCG/0.3 ML DOSE VACCINE: CPT | Mod: PBBFAC | Performed by: INTERNAL MEDICINE

## 2021-02-10 PROCEDURE — 91300 COVID-19, MRNA, LNP-S, PF, 30 MCG/0.3 ML DOSE VACCINE: CPT | Mod: PBBFAC | Performed by: INTERNAL MEDICINE

## 2021-03-01 ENCOUNTER — CLINICAL SUPPORT (OUTPATIENT)
Dept: REHABILITATION | Facility: HOSPITAL | Age: 81
End: 2021-03-01
Attending: INTERNAL MEDICINE
Payer: MEDICARE

## 2021-03-01 DIAGNOSIS — M54.50 RIGHT-SIDED LOW BACK PAIN WITHOUT SCIATICA, UNSPECIFIED CHRONICITY: ICD-10-CM

## 2021-03-01 DIAGNOSIS — R29.898 DECREASED STRENGTH OF LOWER EXTREMITY: ICD-10-CM

## 2021-03-01 PROCEDURE — 97110 THERAPEUTIC EXERCISES: CPT | Mod: PO,CQ

## 2021-03-02 ENCOUNTER — OFFICE VISIT (OUTPATIENT)
Dept: INTERNAL MEDICINE | Facility: CLINIC | Age: 81
End: 2021-03-02
Payer: MEDICARE

## 2021-03-02 VITALS
WEIGHT: 167 LBS | HEART RATE: 74 BPM | OXYGEN SATURATION: 97 % | RESPIRATION RATE: 15 BRPM | HEIGHT: 64 IN | DIASTOLIC BLOOD PRESSURE: 70 MMHG | BODY MASS INDEX: 28.51 KG/M2 | SYSTOLIC BLOOD PRESSURE: 118 MMHG

## 2021-03-02 DIAGNOSIS — M46.96 UNSPECIFIED INFLAMMATORY SPONDYLOPATHY, LUMBAR REGION: ICD-10-CM

## 2021-03-02 DIAGNOSIS — M51.37 DDD (DEGENERATIVE DISC DISEASE), LUMBOSACRAL: ICD-10-CM

## 2021-03-02 DIAGNOSIS — M47.812 ARTHROPATHY OF CERVICAL FACET JOINT: ICD-10-CM

## 2021-03-02 DIAGNOSIS — G89.29 CHRONIC RIGHT-SIDED LOW BACK PAIN WITHOUT SCIATICA: ICD-10-CM

## 2021-03-02 DIAGNOSIS — Z12.83 SCREENING EXAM FOR SKIN CANCER: ICD-10-CM

## 2021-03-02 DIAGNOSIS — Z91.81 RISK FOR FALLS: ICD-10-CM

## 2021-03-02 DIAGNOSIS — I77.89 ASCENDING AORTA ENLARGEMENT: ICD-10-CM

## 2021-03-02 DIAGNOSIS — I70.0 ATHEROSCLEROSIS OF AORTA: ICD-10-CM

## 2021-03-02 DIAGNOSIS — I10 ESSENTIAL HYPERTENSION: ICD-10-CM

## 2021-03-02 DIAGNOSIS — Z86.010 HISTORY OF COLON POLYPS: ICD-10-CM

## 2021-03-02 DIAGNOSIS — M54.50 CHRONIC RIGHT-SIDED LOW BACK PAIN WITHOUT SCIATICA: ICD-10-CM

## 2021-03-02 DIAGNOSIS — G47.00 INSOMNIA, UNSPECIFIED TYPE: ICD-10-CM

## 2021-03-02 DIAGNOSIS — R29.898 DECREASED STRENGTH OF LOWER EXTREMITY: ICD-10-CM

## 2021-03-02 DIAGNOSIS — M47.816 LUMBAR SPONDYLOSIS: ICD-10-CM

## 2021-03-02 DIAGNOSIS — H25.10 NUCLEAR SCLEROSIS, UNSPECIFIED LATERALITY: Primary | ICD-10-CM

## 2021-03-02 DIAGNOSIS — E66.3 OVERWEIGHT (BMI 25.0-29.9): ICD-10-CM

## 2021-03-02 DIAGNOSIS — M47.816 LUMBAR FACET ARTHROPATHY: ICD-10-CM

## 2021-03-02 DIAGNOSIS — R53.1 WEAKNESS: ICD-10-CM

## 2021-03-02 DIAGNOSIS — N18.30 STAGE 3 CHRONIC KIDNEY DISEASE, UNSPECIFIED WHETHER STAGE 3A OR 3B CKD: ICD-10-CM

## 2021-03-02 DIAGNOSIS — Z00.00 ENCOUNTER FOR PREVENTIVE HEALTH EXAMINATION: ICD-10-CM

## 2021-03-02 DIAGNOSIS — M79.18 MYOFASCIAL PAIN SYNDROME: ICD-10-CM

## 2021-03-02 DIAGNOSIS — K21.9 GASTROESOPHAGEAL REFLUX DISEASE WITHOUT ESOPHAGITIS: ICD-10-CM

## 2021-03-02 DIAGNOSIS — G89.4 CHRONIC PAIN SYNDROME: ICD-10-CM

## 2021-03-02 DIAGNOSIS — E78.5 HYPERLIPIDEMIA, UNSPECIFIED HYPERLIPIDEMIA TYPE: ICD-10-CM

## 2021-03-02 DIAGNOSIS — M53.3 SI (SACROILIAC) JOINT DYSFUNCTION: ICD-10-CM

## 2021-03-02 DIAGNOSIS — Z13.5 SCREENING FOR EYE CONDITION: ICD-10-CM

## 2021-03-02 DIAGNOSIS — M48.07 SPINAL STENOSIS OF LUMBOSACRAL REGION: ICD-10-CM

## 2021-03-02 PROBLEM — Z86.0100 HISTORY OF COLON POLYPS: Status: ACTIVE | Noted: 2020-10-01

## 2021-03-02 PROCEDURE — 3078F PR MOST RECENT DIASTOLIC BLOOD PRESSURE < 80 MM HG: ICD-10-PCS | Mod: CPTII,S$GLB,, | Performed by: NURSE PRACTITIONER

## 2021-03-02 PROCEDURE — 99499 RISK ADDL DX/OHS AUDIT: ICD-10-PCS | Mod: S$GLB,,, | Performed by: NURSE PRACTITIONER

## 2021-03-02 PROCEDURE — 99499 UNLISTED E&M SERVICE: CPT | Mod: S$GLB,,, | Performed by: NURSE PRACTITIONER

## 2021-03-02 PROCEDURE — 1101F PR PT FALLS ASSESS DOC 0-1 FALLS W/OUT INJ PAST YR: ICD-10-PCS | Mod: CPTII,S$GLB,, | Performed by: NURSE PRACTITIONER

## 2021-03-02 PROCEDURE — 1158F PR ADVANCE CARE PLANNING DISCUSS DOCUMENTED IN MEDICAL RECORD: ICD-10-PCS | Mod: S$GLB,,, | Performed by: NURSE PRACTITIONER

## 2021-03-02 PROCEDURE — 3078F DIAST BP <80 MM HG: CPT | Mod: CPTII,S$GLB,, | Performed by: NURSE PRACTITIONER

## 2021-03-02 PROCEDURE — 3074F PR MOST RECENT SYSTOLIC BLOOD PRESSURE < 130 MM HG: ICD-10-PCS | Mod: CPTII,S$GLB,, | Performed by: NURSE PRACTITIONER

## 2021-03-02 PROCEDURE — G0439 PPPS, SUBSEQ VISIT: HCPCS | Mod: S$GLB,,, | Performed by: NURSE PRACTITIONER

## 2021-03-02 PROCEDURE — 99999 PR PBB SHADOW E&M-EST. PATIENT-LVL IV: CPT | Mod: PBBFAC,,, | Performed by: NURSE PRACTITIONER

## 2021-03-02 PROCEDURE — 1101F PT FALLS ASSESS-DOCD LE1/YR: CPT | Mod: CPTII,S$GLB,, | Performed by: NURSE PRACTITIONER

## 2021-03-02 PROCEDURE — 3074F SYST BP LT 130 MM HG: CPT | Mod: CPTII,S$GLB,, | Performed by: NURSE PRACTITIONER

## 2021-03-02 PROCEDURE — 1125F PR PAIN SEVERITY QUANTIFIED, PAIN PRESENT: ICD-10-PCS | Mod: S$GLB,,, | Performed by: NURSE PRACTITIONER

## 2021-03-02 PROCEDURE — G0439 PR MEDICARE ANNUAL WELLNESS SUBSEQUENT VISIT: ICD-10-PCS | Mod: S$GLB,,, | Performed by: NURSE PRACTITIONER

## 2021-03-02 PROCEDURE — 99999 PR PBB SHADOW E&M-EST. PATIENT-LVL IV: ICD-10-PCS | Mod: PBBFAC,,, | Performed by: NURSE PRACTITIONER

## 2021-03-02 PROCEDURE — 3288F FALL RISK ASSESSMENT DOCD: CPT | Mod: CPTII,S$GLB,, | Performed by: NURSE PRACTITIONER

## 2021-03-02 PROCEDURE — 3288F PR FALLS RISK ASSESSMENT DOCUMENTED: ICD-10-PCS | Mod: CPTII,S$GLB,, | Performed by: NURSE PRACTITIONER

## 2021-03-02 PROCEDURE — 1158F ADVNC CARE PLAN TLK DOCD: CPT | Mod: S$GLB,,, | Performed by: NURSE PRACTITIONER

## 2021-03-02 PROCEDURE — 1125F AMNT PAIN NOTED PAIN PRSNT: CPT | Mod: S$GLB,,, | Performed by: NURSE PRACTITIONER

## 2021-03-03 ENCOUNTER — CLINICAL SUPPORT (OUTPATIENT)
Dept: REHABILITATION | Facility: HOSPITAL | Age: 81
End: 2021-03-03
Attending: INTERNAL MEDICINE
Payer: MEDICARE

## 2021-03-03 DIAGNOSIS — G89.29 CHRONIC RIGHT-SIDED LOW BACK PAIN WITHOUT SCIATICA: ICD-10-CM

## 2021-03-03 DIAGNOSIS — M54.50 CHRONIC RIGHT-SIDED LOW BACK PAIN WITHOUT SCIATICA: ICD-10-CM

## 2021-03-03 DIAGNOSIS — R29.898 DECREASED STRENGTH OF LOWER EXTREMITY: ICD-10-CM

## 2021-03-03 PROCEDURE — 97110 THERAPEUTIC EXERCISES: CPT | Mod: PO,CQ

## 2021-03-08 ENCOUNTER — CLINICAL SUPPORT (OUTPATIENT)
Dept: REHABILITATION | Facility: HOSPITAL | Age: 81
End: 2021-03-08
Attending: INTERNAL MEDICINE
Payer: MEDICARE

## 2021-03-08 DIAGNOSIS — R29.898 DECREASED STRENGTH OF LOWER EXTREMITY: Primary | ICD-10-CM

## 2021-03-08 PROCEDURE — 97110 THERAPEUTIC EXERCISES: CPT | Mod: PO

## 2021-03-10 ENCOUNTER — CLINICAL SUPPORT (OUTPATIENT)
Dept: REHABILITATION | Facility: HOSPITAL | Age: 81
End: 2021-03-10
Attending: INTERNAL MEDICINE
Payer: MEDICARE

## 2021-03-10 DIAGNOSIS — R29.898 DECREASED STRENGTH OF LOWER EXTREMITY: Primary | ICD-10-CM

## 2021-03-10 PROCEDURE — 97110 THERAPEUTIC EXERCISES: CPT | Mod: PO

## 2021-03-16 ENCOUNTER — CLINICAL SUPPORT (OUTPATIENT)
Dept: REHABILITATION | Facility: HOSPITAL | Age: 81
End: 2021-03-16
Attending: INTERNAL MEDICINE
Payer: MEDICARE

## 2021-03-16 DIAGNOSIS — G89.29 CHRONIC RIGHT-SIDED LOW BACK PAIN WITHOUT SCIATICA: ICD-10-CM

## 2021-03-16 DIAGNOSIS — R29.898 DECREASED STRENGTH OF LOWER EXTREMITY: ICD-10-CM

## 2021-03-16 DIAGNOSIS — M54.50 CHRONIC RIGHT-SIDED LOW BACK PAIN WITHOUT SCIATICA: ICD-10-CM

## 2021-03-16 PROCEDURE — 97140 MANUAL THERAPY 1/> REGIONS: CPT | Mod: PO

## 2021-03-16 PROCEDURE — 97110 THERAPEUTIC EXERCISES: CPT | Mod: PO

## 2021-03-18 ENCOUNTER — PATIENT MESSAGE (OUTPATIENT)
Dept: RESEARCH | Facility: HOSPITAL | Age: 81
End: 2021-03-18

## 2021-03-18 ENCOUNTER — CLINICAL SUPPORT (OUTPATIENT)
Dept: REHABILITATION | Facility: HOSPITAL | Age: 81
End: 2021-03-18
Attending: INTERNAL MEDICINE
Payer: MEDICARE

## 2021-03-18 DIAGNOSIS — R29.898 DECREASED STRENGTH OF LOWER EXTREMITY: Primary | ICD-10-CM

## 2021-03-18 PROCEDURE — 97140 MANUAL THERAPY 1/> REGIONS: CPT | Mod: PO

## 2021-03-18 PROCEDURE — 97110 THERAPEUTIC EXERCISES: CPT | Mod: PO

## 2021-03-22 ENCOUNTER — CLINICAL SUPPORT (OUTPATIENT)
Dept: REHABILITATION | Facility: HOSPITAL | Age: 81
End: 2021-03-22
Attending: INTERNAL MEDICINE
Payer: MEDICARE

## 2021-03-22 DIAGNOSIS — R29.898 DECREASED STRENGTH OF LOWER EXTREMITY: Primary | ICD-10-CM

## 2021-03-22 PROCEDURE — 97110 THERAPEUTIC EXERCISES: CPT | Mod: PO

## 2021-03-22 PROCEDURE — 97140 MANUAL THERAPY 1/> REGIONS: CPT | Mod: PO

## 2021-03-25 ENCOUNTER — CLINICAL SUPPORT (OUTPATIENT)
Dept: REHABILITATION | Facility: HOSPITAL | Age: 81
End: 2021-03-25
Attending: INTERNAL MEDICINE
Payer: MEDICARE

## 2021-03-25 DIAGNOSIS — R29.898 DECREASED STRENGTH OF LOWER EXTREMITY: Primary | ICD-10-CM

## 2021-03-25 PROCEDURE — 97140 MANUAL THERAPY 1/> REGIONS: CPT | Mod: PO

## 2021-03-25 PROCEDURE — 97110 THERAPEUTIC EXERCISES: CPT | Mod: PO

## 2021-03-26 ENCOUNTER — PATIENT MESSAGE (OUTPATIENT)
Dept: RESEARCH | Facility: HOSPITAL | Age: 81
End: 2021-03-26

## 2021-03-29 ENCOUNTER — CLINICAL SUPPORT (OUTPATIENT)
Dept: REHABILITATION | Facility: HOSPITAL | Age: 81
End: 2021-03-29
Attending: INTERNAL MEDICINE
Payer: MEDICARE

## 2021-03-29 DIAGNOSIS — R29.898 DECREASED STRENGTH OF LOWER EXTREMITY: Primary | ICD-10-CM

## 2021-03-29 PROCEDURE — 97140 MANUAL THERAPY 1/> REGIONS: CPT | Mod: PO

## 2021-03-29 PROCEDURE — 97110 THERAPEUTIC EXERCISES: CPT | Mod: PO

## 2021-03-31 ENCOUNTER — OFFICE VISIT (OUTPATIENT)
Dept: DERMATOLOGY | Facility: CLINIC | Age: 81
End: 2021-03-31
Payer: MEDICARE

## 2021-03-31 DIAGNOSIS — L82.1 SK (SEBORRHEIC KERATOSIS): ICD-10-CM

## 2021-03-31 DIAGNOSIS — L71.9 ROSACEA: Primary | ICD-10-CM

## 2021-03-31 DIAGNOSIS — Z12.83 SKIN CANCER SCREENING: ICD-10-CM

## 2021-03-31 DIAGNOSIS — L57.0 AK (ACTINIC KERATOSIS): ICD-10-CM

## 2021-03-31 DIAGNOSIS — L81.4 LENTIGINES: ICD-10-CM

## 2021-03-31 DIAGNOSIS — D18.01 CHERRY ANGIOMA: ICD-10-CM

## 2021-03-31 DIAGNOSIS — Z12.83 SCREENING EXAM FOR SKIN CANCER: ICD-10-CM

## 2021-03-31 DIAGNOSIS — D22.9 MULTIPLE BENIGN NEVI: ICD-10-CM

## 2021-03-31 DIAGNOSIS — I78.1 SPIDER VEINS: ICD-10-CM

## 2021-03-31 PROCEDURE — 99999 PR PBB SHADOW E&M-EST. PATIENT-LVL III: CPT | Mod: PBBFAC,,, | Performed by: DERMATOLOGY

## 2021-03-31 PROCEDURE — 1159F PR MEDICATION LIST DOCUMENTED IN MEDICAL RECORD: ICD-10-PCS | Mod: S$GLB,,, | Performed by: DERMATOLOGY

## 2021-03-31 PROCEDURE — 17000 PR DESTRUCTION(LASER SURGERY,CRYOSURGERY,CHEMOSURGERY),PREMALIGNANT LESIONS,FIRST LESION: ICD-10-PCS | Mod: S$GLB,,, | Performed by: DERMATOLOGY

## 2021-03-31 PROCEDURE — 17000 DESTRUCT PREMALG LESION: CPT | Mod: S$GLB,,, | Performed by: DERMATOLOGY

## 2021-03-31 PROCEDURE — 1159F MED LIST DOCD IN RCRD: CPT | Mod: S$GLB,,, | Performed by: DERMATOLOGY

## 2021-03-31 PROCEDURE — 17003 DESTRUCT PREMALG LES 2-14: CPT | Mod: S$GLB,,, | Performed by: DERMATOLOGY

## 2021-03-31 PROCEDURE — 17003 DESTRUCTION, PREMALIGNANT LESIONS; SECOND THROUGH 14 LESIONS: ICD-10-PCS | Mod: S$GLB,,, | Performed by: DERMATOLOGY

## 2021-03-31 PROCEDURE — 99204 PR OFFICE/OUTPT VISIT, NEW, LEVL IV, 45-59 MIN: ICD-10-PCS | Mod: 25,S$GLB,, | Performed by: DERMATOLOGY

## 2021-03-31 PROCEDURE — 99999 PR PBB SHADOW E&M-EST. PATIENT-LVL III: ICD-10-PCS | Mod: PBBFAC,,, | Performed by: DERMATOLOGY

## 2021-03-31 PROCEDURE — 99204 OFFICE O/P NEW MOD 45 MIN: CPT | Mod: 25,S$GLB,, | Performed by: DERMATOLOGY

## 2021-03-31 RX ORDER — METRONIDAZOLE 7.5 MG/G
GEL TOPICAL 2 TIMES DAILY
Qty: 45 G | Refills: 3 | Status: SHIPPED | OUTPATIENT
Start: 2021-03-31 | End: 2022-03-14

## 2021-07-29 ENCOUNTER — OFFICE VISIT (OUTPATIENT)
Dept: SLEEP MEDICINE | Facility: CLINIC | Age: 81
End: 2021-07-29
Payer: MEDICARE

## 2021-07-29 VITALS
WEIGHT: 164.25 LBS | SYSTOLIC BLOOD PRESSURE: 145 MMHG | BODY MASS INDEX: 30.22 KG/M2 | HEART RATE: 60 BPM | HEIGHT: 62 IN | DIASTOLIC BLOOD PRESSURE: 75 MMHG

## 2021-07-29 DIAGNOSIS — G47.30 SLEEP APNEA, UNSPECIFIED TYPE: ICD-10-CM

## 2021-07-29 DIAGNOSIS — G47.00 INSOMNIA, UNSPECIFIED TYPE: Primary | ICD-10-CM

## 2021-07-29 PROCEDURE — 99499 RISK ADDL DX/OHS AUDIT: ICD-10-PCS | Mod: S$GLB,,, | Performed by: NURSE PRACTITIONER

## 2021-07-29 PROCEDURE — 99999 PR PBB SHADOW E&M-EST. PATIENT-LVL III: ICD-10-PCS | Mod: PBBFAC,,, | Performed by: NURSE PRACTITIONER

## 2021-07-29 PROCEDURE — 99204 OFFICE O/P NEW MOD 45 MIN: CPT | Mod: S$GLB,,, | Performed by: NURSE PRACTITIONER

## 2021-07-29 PROCEDURE — 3078F PR MOST RECENT DIASTOLIC BLOOD PRESSURE < 80 MM HG: ICD-10-PCS | Mod: CPTII,S$GLB,, | Performed by: NURSE PRACTITIONER

## 2021-07-29 PROCEDURE — 99204 PR OFFICE/OUTPT VISIT, NEW, LEVL IV, 45-59 MIN: ICD-10-PCS | Mod: S$GLB,,, | Performed by: NURSE PRACTITIONER

## 2021-07-29 PROCEDURE — 3078F DIAST BP <80 MM HG: CPT | Mod: CPTII,S$GLB,, | Performed by: NURSE PRACTITIONER

## 2021-07-29 PROCEDURE — 3077F PR MOST RECENT SYSTOLIC BLOOD PRESSURE >= 140 MM HG: ICD-10-PCS | Mod: CPTII,S$GLB,, | Performed by: NURSE PRACTITIONER

## 2021-07-29 PROCEDURE — 1126F PR PAIN SEVERITY QUANTIFIED, NO PAIN PRESENT: ICD-10-PCS | Mod: CPTII,S$GLB,, | Performed by: NURSE PRACTITIONER

## 2021-07-29 PROCEDURE — 1126F AMNT PAIN NOTED NONE PRSNT: CPT | Mod: CPTII,S$GLB,, | Performed by: NURSE PRACTITIONER

## 2021-07-29 PROCEDURE — 1159F PR MEDICATION LIST DOCUMENTED IN MEDICAL RECORD: ICD-10-PCS | Mod: CPTII,S$GLB,, | Performed by: NURSE PRACTITIONER

## 2021-07-29 PROCEDURE — 1159F MED LIST DOCD IN RCRD: CPT | Mod: CPTII,S$GLB,, | Performed by: NURSE PRACTITIONER

## 2021-07-29 PROCEDURE — 99999 PR PBB SHADOW E&M-EST. PATIENT-LVL III: CPT | Mod: PBBFAC,,, | Performed by: NURSE PRACTITIONER

## 2021-07-29 PROCEDURE — 3077F SYST BP >= 140 MM HG: CPT | Mod: CPTII,S$GLB,, | Performed by: NURSE PRACTITIONER

## 2021-07-29 PROCEDURE — 99499 UNLISTED E&M SERVICE: CPT | Mod: S$GLB,,, | Performed by: NURSE PRACTITIONER

## 2021-09-08 ENCOUNTER — DOCUMENTATION ONLY (OUTPATIENT)
Dept: REHABILITATION | Facility: HOSPITAL | Age: 81
End: 2021-09-08

## 2021-09-08 DIAGNOSIS — R29.898 DECREASED STRENGTH OF LOWER EXTREMITY: Primary | ICD-10-CM

## 2021-09-14 ENCOUNTER — OFFICE VISIT (OUTPATIENT)
Dept: PRIMARY CARE CLINIC | Facility: CLINIC | Age: 81
End: 2021-09-14
Payer: MEDICARE

## 2021-09-14 ENCOUNTER — LAB VISIT (OUTPATIENT)
Dept: LAB | Facility: HOSPITAL | Age: 81
End: 2021-09-14
Attending: INTERNAL MEDICINE
Payer: MEDICARE

## 2021-09-14 VITALS
HEIGHT: 63 IN | HEART RATE: 68 BPM | OXYGEN SATURATION: 97 % | WEIGHT: 162.69 LBS | BODY MASS INDEX: 28.82 KG/M2 | TEMPERATURE: 97 F | SYSTOLIC BLOOD PRESSURE: 104 MMHG | DIASTOLIC BLOOD PRESSURE: 72 MMHG | RESPIRATION RATE: 12 BRPM

## 2021-09-14 DIAGNOSIS — I10 ESSENTIAL HYPERTENSION: ICD-10-CM

## 2021-09-14 DIAGNOSIS — Z00.00 ANNUAL PHYSICAL EXAM: Primary | ICD-10-CM

## 2021-09-14 DIAGNOSIS — D64.9 ANEMIA, UNSPECIFIED TYPE: ICD-10-CM

## 2021-09-14 DIAGNOSIS — E78.2 MIXED HYPERLIPIDEMIA: ICD-10-CM

## 2021-09-14 DIAGNOSIS — N18.30 STAGE 3 CHRONIC KIDNEY DISEASE, UNSPECIFIED WHETHER STAGE 3A OR 3B CKD: ICD-10-CM

## 2021-09-14 DIAGNOSIS — I70.0 ATHEROSCLEROSIS OF AORTA: ICD-10-CM

## 2021-09-14 DIAGNOSIS — G47.00 INSOMNIA, UNSPECIFIED TYPE: ICD-10-CM

## 2021-09-14 DIAGNOSIS — M47.816 LUMBAR SPONDYLOSIS: ICD-10-CM

## 2021-09-14 DIAGNOSIS — Z12.31 ENCOUNTER FOR SCREENING MAMMOGRAM FOR BREAST CANCER: ICD-10-CM

## 2021-09-14 DIAGNOSIS — R73.01 IMPAIRED FASTING GLUCOSE: ICD-10-CM

## 2021-09-14 DIAGNOSIS — I77.89 ASCENDING AORTA ENLARGEMENT: ICD-10-CM

## 2021-09-14 LAB
ALBUMIN SERPL BCP-MCNC: 4.4 G/DL (ref 3.5–5.2)
ALP SERPL-CCNC: 64 U/L (ref 55–135)
ALT SERPL W/O P-5'-P-CCNC: 24 U/L (ref 10–44)
ANION GAP SERPL CALC-SCNC: 13 MMOL/L (ref 8–16)
AST SERPL-CCNC: 20 U/L (ref 10–40)
BASOPHILS # BLD AUTO: 0.07 K/UL (ref 0–0.2)
BASOPHILS NFR BLD: 1.3 % (ref 0–1.9)
BILIRUB SERPL-MCNC: 1.9 MG/DL (ref 0.1–1)
BUN SERPL-MCNC: 19 MG/DL (ref 8–23)
CALCIUM SERPL-MCNC: 10.5 MG/DL (ref 8.7–10.5)
CHLORIDE SERPL-SCNC: 100 MMOL/L (ref 95–110)
CHOLEST SERPL-MCNC: 154 MG/DL (ref 120–199)
CHOLEST/HDLC SERPL: 3.3 {RATIO} (ref 2–5)
CO2 SERPL-SCNC: 27 MMOL/L (ref 23–29)
CREAT SERPL-MCNC: 1 MG/DL (ref 0.5–1.4)
DIFFERENTIAL METHOD: ABNORMAL
EOSINOPHIL # BLD AUTO: 0.2 K/UL (ref 0–0.5)
EOSINOPHIL NFR BLD: 3.1 % (ref 0–8)
ERYTHROCYTE [DISTWIDTH] IN BLOOD BY AUTOMATED COUNT: 13 % (ref 11.5–14.5)
EST. GFR  (AFRICAN AMERICAN): >60 ML/MIN/1.73 M^2
EST. GFR  (NON AFRICAN AMERICAN): 53.7 ML/MIN/1.73 M^2
ESTIMATED AVG GLUCOSE: 120 MG/DL (ref 68–131)
GLUCOSE SERPL-MCNC: 110 MG/DL (ref 70–110)
HBA1C MFR BLD: 5.8 % (ref 4–5.6)
HCT VFR BLD AUTO: 41.6 % (ref 37–48.5)
HDLC SERPL-MCNC: 46 MG/DL (ref 40–75)
HDLC SERPL: 29.9 % (ref 20–50)
HGB BLD-MCNC: 14.4 G/DL (ref 12–16)
IMM GRANULOCYTES # BLD AUTO: 0.01 K/UL (ref 0–0.04)
IMM GRANULOCYTES NFR BLD AUTO: 0.2 % (ref 0–0.5)
IRON SERPL-MCNC: 129 UG/DL (ref 30–160)
LDLC SERPL CALC-MCNC: 83 MG/DL (ref 63–159)
LYMPHOCYTES # BLD AUTO: 1.7 K/UL (ref 1–4.8)
LYMPHOCYTES NFR BLD: 29.8 % (ref 18–48)
MCH RBC QN AUTO: 31.6 PG (ref 27–31)
MCHC RBC AUTO-ENTMCNC: 34.6 G/DL (ref 32–36)
MCV RBC AUTO: 91 FL (ref 82–98)
MONOCYTES # BLD AUTO: 0.6 K/UL (ref 0.3–1)
MONOCYTES NFR BLD: 10.8 % (ref 4–15)
NEUTROPHILS # BLD AUTO: 3 K/UL (ref 1.8–7.7)
NEUTROPHILS NFR BLD: 54.8 % (ref 38–73)
NONHDLC SERPL-MCNC: 108 MG/DL
NRBC BLD-RTO: 0 /100 WBC
PLATELET # BLD AUTO: 208 K/UL (ref 150–450)
PMV BLD AUTO: 11.2 FL (ref 9.2–12.9)
POTASSIUM SERPL-SCNC: 4 MMOL/L (ref 3.5–5.1)
PROT SERPL-MCNC: 7.6 G/DL (ref 6–8.4)
RBC # BLD AUTO: 4.56 M/UL (ref 4–5.4)
SATURATED IRON: 28 % (ref 20–50)
SODIUM SERPL-SCNC: 140 MMOL/L (ref 136–145)
TOTAL IRON BINDING CAPACITY: 453 UG/DL (ref 250–450)
TRANSFERRIN SERPL-MCNC: 306 MG/DL (ref 200–375)
TRIGL SERPL-MCNC: 125 MG/DL (ref 30–150)
TSH SERPL DL<=0.005 MIU/L-ACNC: 1.79 UIU/ML (ref 0.4–4)
WBC # BLD AUTO: 5.53 K/UL (ref 3.9–12.7)

## 2021-09-14 PROCEDURE — 1126F AMNT PAIN NOTED NONE PRSNT: CPT | Mod: CPTII,S$GLB,, | Performed by: INTERNAL MEDICINE

## 2021-09-14 PROCEDURE — 84466 ASSAY OF TRANSFERRIN: CPT | Performed by: INTERNAL MEDICINE

## 2021-09-14 PROCEDURE — 99397 PER PM REEVAL EST PAT 65+ YR: CPT | Mod: S$GLB,,, | Performed by: INTERNAL MEDICINE

## 2021-09-14 PROCEDURE — 99999 PR PBB SHADOW E&M-EST. PATIENT-LVL III: ICD-10-PCS | Mod: PBBFAC,,, | Performed by: INTERNAL MEDICINE

## 2021-09-14 PROCEDURE — 3074F PR MOST RECENT SYSTOLIC BLOOD PRESSURE < 130 MM HG: ICD-10-PCS | Mod: CPTII,S$GLB,, | Performed by: INTERNAL MEDICINE

## 2021-09-14 PROCEDURE — 36415 COLL VENOUS BLD VENIPUNCTURE: CPT | Mod: PN | Performed by: INTERNAL MEDICINE

## 2021-09-14 PROCEDURE — 80053 COMPREHEN METABOLIC PANEL: CPT | Performed by: INTERNAL MEDICINE

## 2021-09-14 PROCEDURE — 99999 PR PBB SHADOW E&M-EST. PATIENT-LVL III: CPT | Mod: PBBFAC,,, | Performed by: INTERNAL MEDICINE

## 2021-09-14 PROCEDURE — 84443 ASSAY THYROID STIM HORMONE: CPT | Performed by: INTERNAL MEDICINE

## 2021-09-14 PROCEDURE — 1159F PR MEDICATION LIST DOCUMENTED IN MEDICAL RECORD: ICD-10-PCS | Mod: CPTII,S$GLB,, | Performed by: INTERNAL MEDICINE

## 2021-09-14 PROCEDURE — 85025 COMPLETE CBC W/AUTO DIFF WBC: CPT | Performed by: INTERNAL MEDICINE

## 2021-09-14 PROCEDURE — 3078F PR MOST RECENT DIASTOLIC BLOOD PRESSURE < 80 MM HG: ICD-10-PCS | Mod: CPTII,S$GLB,, | Performed by: INTERNAL MEDICINE

## 2021-09-14 PROCEDURE — 1126F PR PAIN SEVERITY QUANTIFIED, NO PAIN PRESENT: ICD-10-PCS | Mod: CPTII,S$GLB,, | Performed by: INTERNAL MEDICINE

## 2021-09-14 PROCEDURE — 99397 PR PREVENTIVE VISIT,EST,65 & OVER: ICD-10-PCS | Mod: S$GLB,,, | Performed by: INTERNAL MEDICINE

## 2021-09-14 PROCEDURE — 3074F SYST BP LT 130 MM HG: CPT | Mod: CPTII,S$GLB,, | Performed by: INTERNAL MEDICINE

## 2021-09-14 PROCEDURE — 1159F MED LIST DOCD IN RCRD: CPT | Mod: CPTII,S$GLB,, | Performed by: INTERNAL MEDICINE

## 2021-09-14 PROCEDURE — 3288F PR FALLS RISK ASSESSMENT DOCUMENTED: ICD-10-PCS | Mod: CPTII,S$GLB,, | Performed by: INTERNAL MEDICINE

## 2021-09-14 PROCEDURE — 3078F DIAST BP <80 MM HG: CPT | Mod: CPTII,S$GLB,, | Performed by: INTERNAL MEDICINE

## 2021-09-14 PROCEDURE — 3288F FALL RISK ASSESSMENT DOCD: CPT | Mod: CPTII,S$GLB,, | Performed by: INTERNAL MEDICINE

## 2021-09-14 PROCEDURE — 80061 LIPID PANEL: CPT | Performed by: INTERNAL MEDICINE

## 2021-09-14 PROCEDURE — 1101F PT FALLS ASSESS-DOCD LE1/YR: CPT | Mod: CPTII,S$GLB,, | Performed by: INTERNAL MEDICINE

## 2021-09-14 PROCEDURE — 83036 HEMOGLOBIN GLYCOSYLATED A1C: CPT | Performed by: INTERNAL MEDICINE

## 2021-09-14 PROCEDURE — 1101F PR PT FALLS ASSESS DOC 0-1 FALLS W/OUT INJ PAST YR: ICD-10-PCS | Mod: CPTII,S$GLB,, | Performed by: INTERNAL MEDICINE

## 2021-09-15 ENCOUNTER — TELEPHONE (OUTPATIENT)
Dept: PRIMARY CARE CLINIC | Facility: CLINIC | Age: 81
End: 2021-09-15

## 2021-09-23 RX ORDER — ROSUVASTATIN CALCIUM 10 MG/1
TABLET, COATED ORAL
Qty: 90 TABLET | Refills: 3 | Status: SHIPPED | OUTPATIENT
Start: 2021-09-23 | End: 2022-02-21 | Stop reason: SDUPTHER

## 2021-10-04 ENCOUNTER — HOSPITAL ENCOUNTER (OUTPATIENT)
Dept: RADIOLOGY | Facility: HOSPITAL | Age: 81
Discharge: HOME | End: 2021-10-04
Attending: INTERNAL MEDICINE
Payer: MEDICARE

## 2021-10-04 VITALS — BODY MASS INDEX: 28.34 KG/M2 | WEIGHT: 160 LBS

## 2021-10-04 DIAGNOSIS — Z12.31 ENCOUNTER FOR SCREENING MAMMOGRAM FOR BREAST CANCER: ICD-10-CM

## 2021-10-04 PROCEDURE — 77063 BREAST TOMOSYNTHESIS BI: CPT | Mod: 26,,, | Performed by: RADIOLOGY

## 2021-10-04 PROCEDURE — 77063 MAMMO DIGITAL SCREENING BILAT WITH TOMO: ICD-10-PCS | Mod: 26,,, | Performed by: RADIOLOGY

## 2021-10-04 PROCEDURE — 77067 SCR MAMMO BI INCL CAD: CPT | Mod: 26,,, | Performed by: RADIOLOGY

## 2021-10-04 PROCEDURE — 77067 MAMMO DIGITAL SCREENING BILAT WITH TOMO: ICD-10-PCS | Mod: 26,,, | Performed by: RADIOLOGY

## 2021-10-04 PROCEDURE — 77067 SCR MAMMO BI INCL CAD: CPT | Mod: TC,PO

## 2021-10-07 ENCOUNTER — TELEPHONE (OUTPATIENT)
Dept: INTERNAL MEDICINE | Facility: CLINIC | Age: 81
End: 2021-10-07

## 2022-01-27 RX ORDER — CHLORTHALIDONE 25 MG/1
TABLET ORAL
Qty: 90 TABLET | Refills: 3 | Status: SHIPPED | OUTPATIENT
Start: 2022-01-27 | End: 2022-02-21 | Stop reason: SDUPTHER

## 2022-02-21 RX ORDER — CHLORTHALIDONE 25 MG/1
25 TABLET ORAL DAILY
Qty: 90 TABLET | Refills: 3 | Status: SHIPPED | OUTPATIENT
Start: 2022-02-21 | End: 2022-04-14

## 2022-02-21 RX ORDER — ROSUVASTATIN CALCIUM 10 MG/1
10 TABLET, COATED ORAL DAILY
Qty: 90 TABLET | Refills: 3 | Status: SHIPPED | OUTPATIENT
Start: 2022-02-21 | End: 2023-01-09

## 2022-02-21 NOTE — TELEPHONE ENCOUNTER
"Pt now using [facility].  LOV 1/25/21.  Pt is scheduled for 3/14/22 for a "6 month f/u".  "

## 2022-03-11 NOTE — PROGRESS NOTES
CC: f/up HTN, HLD, aortic atherosclerosis,   CKD II And insomnia  Vitamin D deficiency and IFBS    81 y.o. female presents for above   HTN, TX : chlorthalidone 25mg   CKD 2 ( was 3)  Denied HA or dizziness  BP today==>144/72    HLD/aortic atherosclerosis, tx rosuvastatin 10mg  - needs Rx udpate--  Denied angina or SOB  Denied claudication or LE discoloration  LDL==>69.8    IFBS, tx diet  Denied increased thirst or urination  A1C==>pending     Vitamin D deficiency, tx supplement  Vitamin D level ==> 53 ( 1/2021)    Insomnia, tx:   Mind races, usually gets up and makes a list and usually helps      Back pain, resolved even w/ recent move and lifting boxes pt asx  Did well w/ PT and Celebrex  Although w/ hx CKD 3 would be cautious w/ NSAID use        MEDCARD: Reviewed  ROS:  No fever, chills ,or night sweats  No chest pain or palpitations  No focal deficits or paresthesia  Remainder of review negative except as previously noted    PMHX: Reviewed  SHX: Reviewed  FHX: Reviewed    PE:  VSS:  GEN: WDWN, A&O, NAD, conversant and co-operative; pleasant as always    EYES: Conj/lids unremarkable, sclera anicteric  NECK: Supple w/o LN or TM  RESP: efforts unlabored, lungs CTA  CV: Heart RRR, no MGR, no carotid bruits noted  1+ pedal pulses, no LE edema  MSK: Gait normal. No CCE  SPINE: NT, neg SLR  NEURO: BALES. No tremor or facial asymmetry  SKIN: Warm and dry    IMPRESSION:  HTN, elevated  CKD 3, asx  HLD, asx, LDL - pending , recall pt had taken intermittently and LDL levels increased  Aortic atherosclerosis,asx   (Aorta, ascending- ULN-  US did not reveal an enlargement)  IFBS, asx  Insomnia, stable  Vitamin D deficiency,       PLAN:  DEXA  Fasting lab  Continue present meds  Rx update  TRIAL melatonin  Sleep hygiene  Restart BP checks at home and advise in 2-3 weeks  Resume low salt diet  Resume exercise  Call prn  RTC 6 mos EPP

## 2022-03-14 ENCOUNTER — LAB VISIT (OUTPATIENT)
Dept: LAB | Facility: HOSPITAL | Age: 82
End: 2022-03-14
Attending: INTERNAL MEDICINE
Payer: MEDICARE

## 2022-03-14 ENCOUNTER — OFFICE VISIT (OUTPATIENT)
Dept: INTERNAL MEDICINE | Facility: CLINIC | Age: 82
End: 2022-03-14
Payer: MEDICARE

## 2022-03-14 VITALS
OXYGEN SATURATION: 98 % | HEIGHT: 63 IN | SYSTOLIC BLOOD PRESSURE: 144 MMHG | RESPIRATION RATE: 16 BRPM | DIASTOLIC BLOOD PRESSURE: 72 MMHG | TEMPERATURE: 98 F | BODY MASS INDEX: 27.46 KG/M2 | HEART RATE: 72 BPM | WEIGHT: 155 LBS

## 2022-03-14 DIAGNOSIS — I10 ESSENTIAL HYPERTENSION: ICD-10-CM

## 2022-03-14 DIAGNOSIS — I70.0 AORTIC ATHEROSCLEROSIS: ICD-10-CM

## 2022-03-14 DIAGNOSIS — I10 ESSENTIAL HYPERTENSION: Primary | ICD-10-CM

## 2022-03-14 DIAGNOSIS — Z78.0 POSTMENOPAUSAL ESTROGEN DEFICIENCY: ICD-10-CM

## 2022-03-14 DIAGNOSIS — E78.2 MIXED HYPERLIPIDEMIA: ICD-10-CM

## 2022-03-14 DIAGNOSIS — R73.01 IMPAIRED FASTING GLUCOSE: ICD-10-CM

## 2022-03-14 DIAGNOSIS — G47.00 INSOMNIA, UNSPECIFIED TYPE: ICD-10-CM

## 2022-03-14 DIAGNOSIS — I77.89 ASCENDING AORTA ENLARGEMENT: ICD-10-CM

## 2022-03-14 DIAGNOSIS — N18.30 STAGE 3 CHRONIC KIDNEY DISEASE, UNSPECIFIED WHETHER STAGE 3A OR 3B CKD: ICD-10-CM

## 2022-03-14 DIAGNOSIS — E55.9 VITAMIN D DEFICIENCY: ICD-10-CM

## 2022-03-14 PROBLEM — M46.96 UNSPECIFIED INFLAMMATORY SPONDYLOPATHY, LUMBAR REGION: Status: RESOLVED | Noted: 2020-01-17 | Resolved: 2022-03-14

## 2022-03-14 LAB
ALBUMIN SERPL BCP-MCNC: 4.4 G/DL (ref 3.5–5.2)
ALP SERPL-CCNC: 68 U/L (ref 55–135)
ALT SERPL W/O P-5'-P-CCNC: 34 U/L (ref 10–44)
ANION GAP SERPL CALC-SCNC: 10 MMOL/L (ref 8–16)
AST SERPL-CCNC: 21 U/L (ref 10–40)
BILIRUB SERPL-MCNC: 1.5 MG/DL (ref 0.1–1)
BUN SERPL-MCNC: 23 MG/DL (ref 8–23)
CALCIUM SERPL-MCNC: 10 MG/DL (ref 8.7–10.5)
CHLORIDE SERPL-SCNC: 99 MMOL/L (ref 95–110)
CO2 SERPL-SCNC: 31 MMOL/L (ref 23–29)
CREAT SERPL-MCNC: 0.8 MG/DL (ref 0.5–1.4)
EST. GFR  (AFRICAN AMERICAN): >60 ML/MIN/1.73 M^2
EST. GFR  (NON AFRICAN AMERICAN): >60 ML/MIN/1.73 M^2
ESTIMATED AVG GLUCOSE: 114 MG/DL (ref 68–131)
GLUCOSE SERPL-MCNC: 106 MG/DL (ref 70–110)
HBA1C MFR BLD: 5.6 % (ref 4–5.6)
POTASSIUM SERPL-SCNC: 3.6 MMOL/L (ref 3.5–5.1)
PROT SERPL-MCNC: 7.6 G/DL (ref 6–8.4)
SODIUM SERPL-SCNC: 140 MMOL/L (ref 136–145)

## 2022-03-14 PROCEDURE — 80053 COMPREHEN METABOLIC PANEL: CPT | Mod: HCNC | Performed by: INTERNAL MEDICINE

## 2022-03-14 PROCEDURE — 36415 COLL VENOUS BLD VENIPUNCTURE: CPT | Mod: HCNC,PO | Performed by: INTERNAL MEDICINE

## 2022-03-14 PROCEDURE — 99214 OFFICE O/P EST MOD 30 MIN: CPT | Mod: S$GLB,,, | Performed by: INTERNAL MEDICINE

## 2022-03-14 PROCEDURE — 83036 HEMOGLOBIN GLYCOSYLATED A1C: CPT | Mod: HCNC | Performed by: INTERNAL MEDICINE

## 2022-03-14 PROCEDURE — 3077F PR MOST RECENT SYSTOLIC BLOOD PRESSURE >= 140 MM HG: ICD-10-PCS | Mod: CPTII,S$GLB,, | Performed by: INTERNAL MEDICINE

## 2022-03-14 PROCEDURE — 1159F PR MEDICATION LIST DOCUMENTED IN MEDICAL RECORD: ICD-10-PCS | Mod: CPTII,S$GLB,, | Performed by: INTERNAL MEDICINE

## 2022-03-14 PROCEDURE — 3288F FALL RISK ASSESSMENT DOCD: CPT | Mod: CPTII,S$GLB,, | Performed by: INTERNAL MEDICINE

## 2022-03-14 PROCEDURE — 99999 PR PBB SHADOW E&M-EST. PATIENT-LVL IV: ICD-10-PCS | Mod: PBBFAC,HCNC,, | Performed by: INTERNAL MEDICINE

## 2022-03-14 PROCEDURE — 99214 PR OFFICE/OUTPT VISIT, EST, LEVL IV, 30-39 MIN: ICD-10-PCS | Mod: S$GLB,,, | Performed by: INTERNAL MEDICINE

## 2022-03-14 PROCEDURE — 3078F PR MOST RECENT DIASTOLIC BLOOD PRESSURE < 80 MM HG: ICD-10-PCS | Mod: CPTII,S$GLB,, | Performed by: INTERNAL MEDICINE

## 2022-03-14 PROCEDURE — 1160F PR REVIEW ALL MEDS BY PRESCRIBER/CLIN PHARMACIST DOCUMENTED: ICD-10-PCS | Mod: CPTII,S$GLB,, | Performed by: INTERNAL MEDICINE

## 2022-03-14 PROCEDURE — 99499 UNLISTED E&M SERVICE: CPT | Mod: HCNC,S$GLB,, | Performed by: INTERNAL MEDICINE

## 2022-03-14 PROCEDURE — 3078F DIAST BP <80 MM HG: CPT | Mod: CPTII,S$GLB,, | Performed by: INTERNAL MEDICINE

## 2022-03-14 PROCEDURE — 99499 RISK ADDL DX/OHS AUDIT: ICD-10-PCS | Mod: HCNC,S$GLB,, | Performed by: INTERNAL MEDICINE

## 2022-03-14 PROCEDURE — 1126F PR PAIN SEVERITY QUANTIFIED, NO PAIN PRESENT: ICD-10-PCS | Mod: CPTII,S$GLB,, | Performed by: INTERNAL MEDICINE

## 2022-03-14 PROCEDURE — 1101F PT FALLS ASSESS-DOCD LE1/YR: CPT | Mod: CPTII,S$GLB,, | Performed by: INTERNAL MEDICINE

## 2022-03-14 PROCEDURE — 1126F AMNT PAIN NOTED NONE PRSNT: CPT | Mod: CPTII,S$GLB,, | Performed by: INTERNAL MEDICINE

## 2022-03-14 PROCEDURE — 3077F SYST BP >= 140 MM HG: CPT | Mod: CPTII,S$GLB,, | Performed by: INTERNAL MEDICINE

## 2022-03-14 PROCEDURE — 1101F PR PT FALLS ASSESS DOC 0-1 FALLS W/OUT INJ PAST YR: ICD-10-PCS | Mod: CPTII,S$GLB,, | Performed by: INTERNAL MEDICINE

## 2022-03-14 PROCEDURE — 99999 PR PBB SHADOW E&M-EST. PATIENT-LVL IV: CPT | Mod: PBBFAC,HCNC,, | Performed by: INTERNAL MEDICINE

## 2022-03-14 PROCEDURE — 3288F PR FALLS RISK ASSESSMENT DOCUMENTED: ICD-10-PCS | Mod: CPTII,S$GLB,, | Performed by: INTERNAL MEDICINE

## 2022-03-14 PROCEDURE — 1160F RVW MEDS BY RX/DR IN RCRD: CPT | Mod: CPTII,S$GLB,, | Performed by: INTERNAL MEDICINE

## 2022-03-14 PROCEDURE — 1159F MED LIST DOCD IN RCRD: CPT | Mod: CPTII,S$GLB,, | Performed by: INTERNAL MEDICINE

## 2022-03-15 ENCOUNTER — TELEPHONE (OUTPATIENT)
Dept: INTERNAL MEDICINE | Facility: CLINIC | Age: 82
End: 2022-03-15
Payer: MEDICARE

## 2022-03-15 NOTE — TELEPHONE ENCOUNTER
----- Message from Rosemarie Brooks MD sent at 3/15/2022  8:10 AM CDT -----  Your lab has resulted and your pre-diabetic level has improved again, and is now in the normal range.  Please continue to avoid sugars and simple carbohydrates and keep well hydrated,   remember water is your best option.  Your chemistries have also improved and your kidney function is now back at level 2.  All  the above recommendations apply to the kidney function as well as the blood sugar.  Please do not hesitate to call/email if you have any questions or concerns   Rosemarie Perez

## 2022-03-24 ENCOUNTER — APPOINTMENT (OUTPATIENT)
Dept: RADIOLOGY | Facility: CLINIC | Age: 82
End: 2022-03-24
Attending: INTERNAL MEDICINE
Payer: MEDICARE

## 2022-03-24 DIAGNOSIS — Z78.0 POSTMENOPAUSAL ESTROGEN DEFICIENCY: ICD-10-CM

## 2022-03-24 PROCEDURE — 77080 DEXA BONE DENSITY SPINE HIP: ICD-10-PCS | Mod: 26,,, | Performed by: INTERNAL MEDICINE

## 2022-03-24 PROCEDURE — 77080 DXA BONE DENSITY AXIAL: CPT | Mod: TC,PO

## 2022-03-24 PROCEDURE — 77080 DXA BONE DENSITY AXIAL: CPT | Mod: 26,,, | Performed by: INTERNAL MEDICINE

## 2022-04-14 RX ORDER — CHLORTHALIDONE 25 MG/1
25 TABLET ORAL DAILY
Qty: 90 TABLET | Refills: 3 | Status: SHIPPED | OUTPATIENT
Start: 2022-04-14 | End: 2023-03-13

## 2022-04-21 ENCOUNTER — TELEPHONE (OUTPATIENT)
Dept: INTERNAL MEDICINE | Facility: CLINIC | Age: 82
End: 2022-04-21

## 2022-04-21 DIAGNOSIS — R93.7 ABNORMAL FINDINGS ON DIAGNOSTIC IMAGING OF MUSCULOSKELETAL SYSTEM: Primary | ICD-10-CM

## 2022-04-21 NOTE — TELEPHONE ENCOUNTER
Please advise pt that her DEXA revealed normal spine, but concern  that normal appearance was actually related to compression of the bone,   w/ recommendation for L-spine xrays- orders placed    The hip was stable on this imaging

## 2022-04-22 ENCOUNTER — HOSPITAL ENCOUNTER (OUTPATIENT)
Dept: RADIOLOGY | Facility: CLINIC | Age: 82
Discharge: HOME | End: 2022-04-22
Attending: INTERNAL MEDICINE

## 2022-04-22 ENCOUNTER — TELEPHONE (OUTPATIENT)
Dept: INTERNAL MEDICINE | Facility: CLINIC | Age: 82
End: 2022-04-22

## 2022-04-22 DIAGNOSIS — R93.7 ABNORMAL FINDINGS ON DIAGNOSTIC IMAGING OF MUSCULOSKELETAL SYSTEM: ICD-10-CM

## 2022-04-22 PROCEDURE — 72110 X-RAY EXAM L-2 SPINE 4/>VWS: CPT | Mod: 26,,, | Performed by: RADIOLOGY

## 2022-04-22 PROCEDURE — 72110 XR LUMBAR SPINE COMPLETE 5 VIEW: ICD-10-PCS | Mod: 26,,, | Performed by: RADIOLOGY

## 2022-04-22 PROCEDURE — 72110 X-RAY EXAM L-2 SPINE 4/>VWS: CPT | Mod: TC,FY,PO

## 2022-04-22 NOTE — TELEPHONE ENCOUNTER
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  There was concern that perhaps you had an osteoporotic compression fracture that was making your bone appear denser, however this does not appear to be the case.  You do have demineralized bone but there does not appear to be any compression of the vertebrae.  Please do not hesitate to call/email if you have any questions or concerns.  Rosemarie Perez

## 2022-06-07 ENCOUNTER — TELEPHONE (OUTPATIENT)
Dept: INTERNAL MEDICINE | Facility: CLINIC | Age: 82
End: 2022-06-07
Payer: COMMERCIAL

## 2022-08-12 ENCOUNTER — PES CALL (OUTPATIENT)
Dept: ADMINISTRATIVE | Facility: CLINIC | Age: 82
End: 2022-08-12
Payer: COMMERCIAL

## 2022-09-11 NOTE — PROGRESS NOTES
CC: Annual PE    82 y.o. female presents for PE  Non smoker  Social ETOH    HEALTH MAINTENANCE:  Cholesterol/labs: reviewed  C-scope: 10/2020, neg- no f/up rec  Recall, 2015  One 3 mm polyp in the ascending colon. Resected   and retrieved. Tubular adenoma  - Melanosis in the colon.  - The examination was otherwise normal on direct   and retroflexion views.  EGD: 9/2015- occ dysphagia  Impression: - LA Grade B reflux esophagitis.  - Small hiatus hernia.  - Non-bleeding erosive gastropathy. Biopsied.  - Normal examined duodenum.  WWE: aged out, asx  Mammo: 9/2020  DEXA: 2018- normal  AAA evaluation: 11/2018- Negative  EYE exam: UTD  DDS exam: UTD    VACCINATIONS:  TD: 2/2009, 2019  Flu: yearly  Pneumovax: 8/2014,  Prevnar: 10/2015, 7/2016  Zostavax: 3/21/2011  Shingrix; completed 11/2018  Covid: x 3    MEDCARD: Reviewed  ROS:  No fever, chills, or night sweats  No visual disturbance or d/c  No ear or sinus pain or pressure  No dysphagia or early satiety  No chest pain or palpitations  No cough or wheezing  No PND or orthopnea  No GERD or abdominal pain  No change in bowels or blood in stool  No hematuria or dysuria;   No vaginal bleeding or pelvic pain or bloating  No skin rashes or lesions  No joint swelling or erythema  No unusual HA or focal deficits  No cold or heat intolerance  No increased thirst or urination  No unusual bruising or bleeding  Improved back pain worse in AM, walking prn  ADL's: 100% independent  Memory: Good, delayed recall  Mental health: stress w/ move to [place]  Advance Directives:+ will, + living will, and M/POA   Nutrtion: Good  Gait: No falls  Safety: Intact,  Urinary incontinence: n/a  Remainder of review negative except as previously noted    PMHX:Reviewed  PSHX: Reviewed  SHX: Reviewed      PE:  VS: As noted  GENERAL: Well developed well nourished, alert and oriented in NAD  Conversant and co-operative  EYES: Conjunctiva and lids normal, sclera anicteric  NECK: Supple w/o thyromegaly or  lymphadenopathy  RESPIRATORY: Efforts are unlabored; lungs are CTAP  CARDIOVASCULAR: Heart RRR w/o mumur, gallop or rub; No carotid bruits noted  1+ pedal pulses; no LE edema noted  GASTROINTESTINAL: Bowel sounds are present, soft, nontender, nondistended  No hepatosplenomegaly noted.  MUSCULOSKELETAL: Gait normal. No clubbing, cyanosis, or edema noted.  Spine:NT, tender over the right SI region  Neg SLR  NEUROLOGIC: BALES. No tremor noted  SKIN: Warm and dry    IMPRESSION:  Annual PE  FHx: Breast cancer  HTN, stable  CKD , III, asx  Hypercholesterolemia,asx   Aortic atherosclerosis, asx  Ascending aorta- ULN 3.7cm-  (AAA scan  11/2018- WNL)  Insomnia, chronic  IFBS, remote  Inflammatory spondylopathy L-spine  Anemia    PLAN:   Fasting lab  Mammo  Consult External PT-LBP  Exercise  Avoid salt  Monitor BP  Continue present meds  Rx update  Vitamin D supplementation  Tylenol Arthritis okay  Avoid NSAIDS  Call prn  RTC 6 mos

## 2022-09-16 ENCOUNTER — OFFICE VISIT (OUTPATIENT)
Dept: INTERNAL MEDICINE | Facility: CLINIC | Age: 82
End: 2022-09-16
Payer: COMMERCIAL

## 2022-09-16 VITALS
OXYGEN SATURATION: 96 % | HEART RATE: 95 BPM | RESPIRATION RATE: 20 BRPM | TEMPERATURE: 98 F | SYSTOLIC BLOOD PRESSURE: 124 MMHG | HEIGHT: 62 IN | WEIGHT: 156.31 LBS | DIASTOLIC BLOOD PRESSURE: 66 MMHG | BODY MASS INDEX: 28.76 KG/M2

## 2022-09-16 DIAGNOSIS — G47.00 INSOMNIA, UNSPECIFIED TYPE: ICD-10-CM

## 2022-09-16 DIAGNOSIS — D64.9 ANEMIA, UNSPECIFIED TYPE: ICD-10-CM

## 2022-09-16 DIAGNOSIS — N18.30 STAGE 3 CHRONIC KIDNEY DISEASE, UNSPECIFIED WHETHER STAGE 3A OR 3B CKD: ICD-10-CM

## 2022-09-16 DIAGNOSIS — E78.2 MIXED HYPERLIPIDEMIA: ICD-10-CM

## 2022-09-16 DIAGNOSIS — I70.0 AORTIC ATHEROSCLEROSIS: ICD-10-CM

## 2022-09-16 DIAGNOSIS — M46.96 INFLAMMATORY SPONDYLOPATHY OF LUMBAR REGION: ICD-10-CM

## 2022-09-16 DIAGNOSIS — I77.89 ASCENDING AORTA ENLARGEMENT: ICD-10-CM

## 2022-09-16 DIAGNOSIS — Z00.00 ANNUAL PHYSICAL EXAM: Primary | ICD-10-CM

## 2022-09-16 DIAGNOSIS — Z12.31 ENCOUNTER FOR SCREENING MAMMOGRAM FOR BREAST CANCER: ICD-10-CM

## 2022-09-16 DIAGNOSIS — I10 ESSENTIAL HYPERTENSION: ICD-10-CM

## 2022-09-16 DIAGNOSIS — R73.01 IMPAIRED FASTING GLUCOSE: ICD-10-CM

## 2022-09-16 PROCEDURE — 99999 PR PBB SHADOW E&M-EST. PATIENT-LVL IV: ICD-10-PCS | Mod: PBBFAC,,, | Performed by: INTERNAL MEDICINE

## 2022-09-16 PROCEDURE — 99397 PR PREVENTIVE VISIT,EST,65 & OVER: ICD-10-PCS | Mod: S$GLB,,, | Performed by: INTERNAL MEDICINE

## 2022-09-16 PROCEDURE — 99397 PER PM REEVAL EST PAT 65+ YR: CPT | Mod: S$GLB,,, | Performed by: INTERNAL MEDICINE

## 2022-09-16 PROCEDURE — 99214 OFFICE O/P EST MOD 30 MIN: CPT | Mod: PBBFAC,PO | Performed by: INTERNAL MEDICINE

## 2022-09-16 PROCEDURE — 99999 PR PBB SHADOW E&M-EST. PATIENT-LVL IV: CPT | Mod: PBBFAC,,, | Performed by: INTERNAL MEDICINE

## 2022-11-01 ENCOUNTER — HOSPITAL ENCOUNTER (OUTPATIENT)
Dept: RADIOLOGY | Facility: HOSPITAL | Age: 82
Discharge: HOME | End: 2022-11-01
Attending: INTERNAL MEDICINE
Payer: COMMERCIAL

## 2022-11-01 VITALS — HEIGHT: 62 IN | BODY MASS INDEX: 28.52 KG/M2 | WEIGHT: 155 LBS

## 2022-11-01 DIAGNOSIS — Z12.31 ENCOUNTER FOR SCREENING MAMMOGRAM FOR BREAST CANCER: ICD-10-CM

## 2022-11-01 PROCEDURE — 77067 MAMMO DIGITAL SCREENING BILAT WITH TOMO: ICD-10-PCS | Mod: 26,HCNC,, | Performed by: RADIOLOGY

## 2022-11-01 PROCEDURE — 77063 MAMMO DIGITAL SCREENING BILAT WITH TOMO: ICD-10-PCS | Mod: 26,HCNC,, | Performed by: RADIOLOGY

## 2022-11-01 PROCEDURE — 77063 BREAST TOMOSYNTHESIS BI: CPT | Mod: TC,HCNC,PO

## 2022-11-01 PROCEDURE — 77063 BREAST TOMOSYNTHESIS BI: CPT | Mod: 26,HCNC,, | Performed by: RADIOLOGY

## 2022-11-01 PROCEDURE — 77067 SCR MAMMO BI INCL CAD: CPT | Mod: 26,HCNC,, | Performed by: RADIOLOGY

## 2022-11-05 ENCOUNTER — TELEPHONE (OUTPATIENT)
Dept: INTERNAL MEDICINE | Facility: CLINIC | Age: 82
End: 2022-11-05
Payer: MEDICARE

## 2022-11-05 NOTE — TELEPHONE ENCOUNTER
----- Message from Rosemarie Brooks MD sent at 11/3/2022  2:44 PM CDT -----  Your lab has resulted and you have two thyroid tests, the TSH is a screening test and it is elevated indicating a possible hypothyroid trend.  The other thyroid test is the free T4 and is in the normal range.    If you are having increasing fatigue, cold sensation, constipation, or weight gain despite a healthy diet and appropriate exercise, then a small dose of supplemental thyroid hormone medication can be added to your regimen.    If you are not having any symptoms we will continue to trend your thyroid levels in approximately 6 months.  Please advise of your status.    Your hemoglobin A1c, remember that it has a 90 day average of your blood sugar,  Has remained in the normal range.  Your chemistries are unremarkable, except for your total bilirubin and it  has continued to normalize.  And your BUN is slightly elevated, indicating mild dehydration,  perhaps due to fasting when you had the lab work done but please remember to keep well hydrated.  Your cholesterol level is excellent at 145, please continue your daily rosuvastatin.  Your CBC is unremarkable, you are not anemic and your iron levels are in the normal range, please continue to keep iron rich foods in your daily diet.  Please do not hesitate to call/email if you have any questions or concerns.  Rosemarie Perez

## 2022-11-06 ENCOUNTER — TELEPHONE (OUTPATIENT)
Dept: INTERNAL MEDICINE | Facility: CLINIC | Age: 82
End: 2022-11-06
Payer: MEDICARE

## 2022-11-06 NOTE — TELEPHONE ENCOUNTER
----- Message from Rosemarie Brooks MD sent at 11/6/2022 11:53 AM CST -----  Please note that your mammogram was read as follows  Impression:  There is no mammographic evidence of malignancy.   chloe

## 2022-12-14 DIAGNOSIS — M25.562 LEFT KNEE PAIN, UNSPECIFIED CHRONICITY: Primary | ICD-10-CM

## 2022-12-15 ENCOUNTER — OFFICE VISIT (OUTPATIENT)
Dept: ORTHOPEDICS | Facility: CLINIC | Age: 82
End: 2022-12-15
Payer: COMMERCIAL

## 2022-12-15 ENCOUNTER — HOSPITAL ENCOUNTER (OUTPATIENT)
Dept: RADIOLOGY | Facility: HOSPITAL | Age: 82
Discharge: HOME | End: 2022-12-15
Attending: ORTHOPAEDIC SURGERY
Payer: COMMERCIAL

## 2022-12-15 VITALS — HEIGHT: 62 IN | RESPIRATION RATE: 18 BRPM | BODY MASS INDEX: 28.52 KG/M2 | WEIGHT: 155 LBS

## 2022-12-15 DIAGNOSIS — M25.562 LEFT KNEE PAIN, UNSPECIFIED CHRONICITY: ICD-10-CM

## 2022-12-15 DIAGNOSIS — M17.10 ARTHRITIS OF KNEE: Primary | ICD-10-CM

## 2022-12-15 PROCEDURE — 20610 DRAIN/INJ JOINT/BURSA W/O US: CPT | Mod: HCNC,LT,S$GLB, | Performed by: ORTHOPAEDIC SURGERY

## 2022-12-15 PROCEDURE — 99999 PR PBB SHADOW E&M-EST. PATIENT-LVL III: CPT | Mod: PBBFAC,HCNC,, | Performed by: ORTHOPAEDIC SURGERY

## 2022-12-15 PROCEDURE — 1125F AMNT PAIN NOTED PAIN PRSNT: CPT | Mod: HCNC,CPTII,S$GLB, | Performed by: ORTHOPAEDIC SURGERY

## 2022-12-15 PROCEDURE — 73564 XR KNEE ORTHO LEFT WITH FLEXION: ICD-10-PCS | Mod: 26,HCNC,LT, | Performed by: RADIOLOGY

## 2022-12-15 PROCEDURE — 1160F RVW MEDS BY RX/DR IN RCRD: CPT | Mod: HCNC,CPTII,S$GLB, | Performed by: ORTHOPAEDIC SURGERY

## 2022-12-15 PROCEDURE — 99204 OFFICE O/P NEW MOD 45 MIN: CPT | Mod: 25,HCNC,S$GLB, | Performed by: ORTHOPAEDIC SURGERY

## 2022-12-15 PROCEDURE — 1125F PR PAIN SEVERITY QUANTIFIED, PAIN PRESENT: ICD-10-PCS | Mod: HCNC,CPTII,S$GLB, | Performed by: ORTHOPAEDIC SURGERY

## 2022-12-15 PROCEDURE — 1159F PR MEDICATION LIST DOCUMENTED IN MEDICAL RECORD: ICD-10-PCS | Mod: HCNC,CPTII,S$GLB, | Performed by: ORTHOPAEDIC SURGERY

## 2022-12-15 PROCEDURE — 73564 X-RAY EXAM KNEE 4 OR MORE: CPT | Mod: 26,HCNC,LT, | Performed by: RADIOLOGY

## 2022-12-15 PROCEDURE — 99999 PR PBB SHADOW E&M-EST. PATIENT-LVL III: ICD-10-PCS | Mod: PBBFAC,HCNC,, | Performed by: ORTHOPAEDIC SURGERY

## 2022-12-15 PROCEDURE — 73564 X-RAY EXAM KNEE 4 OR MORE: CPT | Mod: TC,HCNC,PN,LT

## 2022-12-15 PROCEDURE — 20610 LARGE JOINT ASPIRATION/INJECTION: L KNEE: ICD-10-PCS | Mod: HCNC,LT,S$GLB, | Performed by: ORTHOPAEDIC SURGERY

## 2022-12-15 PROCEDURE — 99204 PR OFFICE/OUTPT VISIT, NEW, LEVL IV, 45-59 MIN: ICD-10-PCS | Mod: 25,HCNC,S$GLB, | Performed by: ORTHOPAEDIC SURGERY

## 2022-12-15 PROCEDURE — 1159F MED LIST DOCD IN RCRD: CPT | Mod: HCNC,CPTII,S$GLB, | Performed by: ORTHOPAEDIC SURGERY

## 2022-12-15 PROCEDURE — 73562 X-RAY EXAM OF KNEE 3: CPT | Mod: 26,HCNC,RT, | Performed by: RADIOLOGY

## 2022-12-15 PROCEDURE — 1160F PR REVIEW ALL MEDS BY PRESCRIBER/CLIN PHARMACIST DOCUMENTED: ICD-10-PCS | Mod: HCNC,CPTII,S$GLB, | Performed by: ORTHOPAEDIC SURGERY

## 2022-12-15 PROCEDURE — 73562 XR KNEE ORTHO LEFT WITH FLEXION: ICD-10-PCS | Mod: 26,HCNC,RT, | Performed by: RADIOLOGY

## 2022-12-15 RX ORDER — TRIAMCINOLONE ACETONIDE 40 MG/ML
40 INJECTION, SUSPENSION INTRA-ARTICULAR; INTRAMUSCULAR
Status: DISCONTINUED | OUTPATIENT
Start: 2022-12-15 | End: 2022-12-15 | Stop reason: HOSPADM

## 2022-12-15 RX ADMIN — TRIAMCINOLONE ACETONIDE 40 MG: 40 INJECTION, SUSPENSION INTRA-ARTICULAR; INTRAMUSCULAR at 10:12

## 2022-12-15 NOTE — PROGRESS NOTES
Past Medical History:   Diagnosis Date    DJD (degenerative joint disease)     Hyperlipidemia     Hypertension     Nuclear sclerosis - Both Eyes 2012    Rosacea     Vitamin D deficiency disease        Past Surgical History:   Procedure Laterality Date    APPENDECTOMY      BREAST BIOPSY Left     2015    BREAST BIOPSY Right     10/2015 ex bx- papilloma     breast biopsy, left      10/2015     SECTION, CLASSIC      Three times    CHOLECYSTECTOMY      COLONOSCOPY N/A 10/1/2020    Procedure: COLONOSCOPY;  Surgeon: Darek Martínez MD;  [facility]  Service: Endoscopy;  Laterality: N/A;  -covid uc met-tb    INJECTION OF ANESTHETIC AGENT INTO SACROILIAC JOINT Left 2018    Procedure: BLOCK, SACROILIAC JOINT- Left - ORAL SEDATION;  Surgeon: Ricci Lockett Jr., MD;  Location: [facility] PAIN MGT;  Service: Pain Management;  Laterality: Left;    INJECTION OF ANESTHETIC AGENT INTO SACROILIAC JOINT Left 2019    Procedure: BLOCK, SACROILIAC JOINT---Left W/Oral Sedation;  Surgeon: Ricci Lockett Jr., MD;  Location: [facility] PAIN MGT;  Service: Pain Management;  Laterality: Left;    JOINT REPLACEMENT Right     TKA    SHOULDER OPEN ROTATOR CUFF REPAIR Right     TOTAL KNEE ARTHROPLASTY      right knee       Current Outpatient Medications   Medication Sig    acetaminophen (TYLENOL) 650 MG TbSR Take 1 tablet (650 mg total) by mouth every 8 (eight) hours. (Patient taking differently: Take 650 mg by mouth as needed.)    chlorthalidone (HYGROTEN) 25 MG Tab Take 1 tablet (25 mg total) by mouth once daily.    rosuvastatin (CRESTOR) 10 MG tablet Take 1 tablet (10 mg total) by mouth once daily.    vitamin D 1000 units Tab Take 1,000 Units by mouth once daily.     No current facility-administered medications for this visit.       Review of patient's allergies indicates:   Allergen Reactions    Sulfacetamide      Other reaction(s): Unknown       Family History   Problem Relation Age of Onset    Hypertension  Mother     Stroke Father     Heart disease Father     Cataracts Sister         s/p cataracts surgery    Hypertension Sister     Migraines Sister     Breast cancer Sister 77        stage 4     Cancer Sister         breast    Diabetes Brother         d. 50's in [place] (s/p BKA)    Kidney failure Brother         d.65    Kidney disease Brother         ESRD/Dialysis    Other Brother         d.34.s/p appendectomy    Diabetes Maternal Grandfather     No Known Problems Son         lives in [place]    No Known Problems Son         lives in [place], 4 children    No Known Problems Son         his son is Autistic 11,nonverbal, dtr 13 yrs older- lives [place]    Rectal cancer Neg Hx     Esophageal cancer Neg Hx     Colon cancer Neg Hx     Stomach cancer Neg Hx        Social History     Socioeconomic History    Marital status:    Tobacco Use    Smoking status: Never    Smokeless tobacco: Never   Substance and Sexual Activity    Alcohol use: Yes     Alcohol/week: 1.0 standard drink     Types: 1 Glasses of wine per week     Comment: approximately 2 glasses of wine weekly    Drug use: No    Sexual activity: Yes     Partners: Male       Chief Complaint:   Chief Complaint   Patient presents with    Left Knee - Pain       History of present illness:  This is a pleasant 82-year-old female seen for left knee pain.  Patient has a history of knee osteoarthritis in both knees.  She had her right knee replaced about 15 years ago without incident.  Her left knee is been giving her more trouble over the last few months.  She tried some physical therapy without improvement.  The pain in her leg is altering her gait which is affecting her hip and back.  Patient denies swelling.  Has a lot of crunching under her kneecap.  Constant ache.  No recent treatment such as injections.  Pain is a 5/10.      Review of Systems:    Constitution: Negative for chills, fever, and sweats.  Negative for unexplained weight loss.    HENT:  Negative for  headaches and blurry vision.    Cardiovascular:Negative for chest pain or irregular heart beat. Negative for hypertension.    Respiratory:  Negative for cough and shortness of breath.    Gastrointestinal: Negative for abdominal pain, heartburn, melena, nausea, and vomitting.    Genitourinary:  Negative bladder incontinence and dysuria.    Musculoskeletal:  See HPI    Neurological: Negative for numbness.    Psychiatric/Behavioral: Negative for depression.  The patient is not nervous/anxious.      Endocrine: Negative for polyuria    Hematologic/Lymphatic: Negative for bleeding problem.  Does not bruise/bleed easily.    Skin: Negative for poor would healing and rash      Physical Examination:    Vital Signs:    Vitals:    12/15/22 1022   Resp: 18       Body mass index is 28.35 kg/m².    This a well-developed, well nourished patient in no acute distress.  They are alert and oriented and cooperative to examination.  Pt. walks without an antalgic gait.      Examination of the left knee shows no rashes or erythema. There are no masses ecchymosis or effusion. Patient has full range of motion from 0-130°. Patient is nontender to palpation over lateral joint line and moderately tender to palpation over the medial joint line. Patient has a - Lachman exam, - anterior drawer exam, and - posterior drawer exam. - Kathleen's exam. Knee is stable to varus and valgus stress. 5 out of 5 motor strength. Palpable distal pulses. Intact light touch sensation.  Moderate Patellofemoral crepitus      X-rays:  X-rays of the left knee is ordered and reviewed which show moderate to severe medial compartment arthritis of the left knee as well as some patellofemoral arthritis.  Well-aligned right total knee arthroplasty without complication.     Assessment::  Status post right total knee arthroplasty without incident  Severe left knee osteoarthritis    Plan:  I reviewed the findings with her today.  We talked about both her good knee and the  painful left knee.  Patient elected to try a cortisone injection today.  She did well with a total knee replacement on the other side but does not want to do that on the left unless absolutely necessary.  We also talked about viscosupplementation another nonsurgical modalities such as nerve ablation or PRP.    This note was created using Phan[employer] voice recognition software that occasionally misinterpreted phrases or words.    Consult note is delivered via Epic messaging service.

## 2022-12-15 NOTE — PROCEDURES
Large Joint Aspiration/Injection: L knee    Date/Time: 12/15/2022 10:30 AM  Performed by: Efrain Mejia MD  Authorized by: Efrain Mejia MD     Consent Done?:  Yes (Verbal)  Indications:  Pain  Site marked: the procedure site was marked    Timeout: prior to procedure the correct patient, procedure, and site was verified    Local anesthetic:  Lidocaine 1% without epinephrine and bupivacaine 0.25% without epinephrine  Anesthetic total (ml):  6      Details:  Needle Size:  20 G  Approach:  Anterolateral  Location:  Knee  Site:  L knee  Medications:  40 mg triamcinolone acetonide 40 mg/mL  Patient tolerance:  Patient tolerated the procedure well with no immediate complications

## 2023-02-07 DIAGNOSIS — Z00.00 ENCOUNTER FOR MEDICARE ANNUAL WELLNESS EXAM: ICD-10-CM

## 2023-02-09 DIAGNOSIS — Z00.00 ENCOUNTER FOR MEDICARE ANNUAL WELLNESS EXAM: ICD-10-CM

## 2023-03-01 ENCOUNTER — OFFICE VISIT (OUTPATIENT)
Dept: SPINE | Facility: CLINIC | Age: 83
End: 2023-03-01
Payer: MEDICARE

## 2023-03-01 VITALS — BODY MASS INDEX: 28.52 KG/M2 | WEIGHT: 155 LBS | HEIGHT: 62 IN

## 2023-03-01 DIAGNOSIS — M54.50 ACUTE RIGHT-SIDED LOW BACK PAIN WITHOUT SCIATICA: Primary | ICD-10-CM

## 2023-03-01 PROCEDURE — 1159F PR MEDICATION LIST DOCUMENTED IN MEDICAL RECORD: ICD-10-PCS | Mod: HCNC,CPTII,S$GLB, | Performed by: PHYSICAL MEDICINE & REHABILITATION

## 2023-03-01 PROCEDURE — 1160F PR REVIEW ALL MEDS BY PRESCRIBER/CLIN PHARMACIST DOCUMENTED: ICD-10-PCS | Mod: HCNC,CPTII,S$GLB, | Performed by: PHYSICAL MEDICINE & REHABILITATION

## 2023-03-01 PROCEDURE — 99204 PR OFFICE/OUTPT VISIT, NEW, LEVL IV, 45-59 MIN: ICD-10-PCS | Mod: HCNC,S$GLB,, | Performed by: PHYSICAL MEDICINE & REHABILITATION

## 2023-03-01 PROCEDURE — 1125F AMNT PAIN NOTED PAIN PRSNT: CPT | Mod: HCNC,CPTII,S$GLB, | Performed by: PHYSICAL MEDICINE & REHABILITATION

## 2023-03-01 PROCEDURE — 3288F FALL RISK ASSESSMENT DOCD: CPT | Mod: HCNC,CPTII,S$GLB, | Performed by: PHYSICAL MEDICINE & REHABILITATION

## 2023-03-01 PROCEDURE — 1160F RVW MEDS BY RX/DR IN RCRD: CPT | Mod: HCNC,CPTII,S$GLB, | Performed by: PHYSICAL MEDICINE & REHABILITATION

## 2023-03-01 PROCEDURE — 1101F PT FALLS ASSESS-DOCD LE1/YR: CPT | Mod: HCNC,CPTII,S$GLB, | Performed by: PHYSICAL MEDICINE & REHABILITATION

## 2023-03-01 PROCEDURE — 3288F PR FALLS RISK ASSESSMENT DOCUMENTED: ICD-10-PCS | Mod: HCNC,CPTII,S$GLB, | Performed by: PHYSICAL MEDICINE & REHABILITATION

## 2023-03-01 PROCEDURE — 1125F PR PAIN SEVERITY QUANTIFIED, PAIN PRESENT: ICD-10-PCS | Mod: HCNC,CPTII,S$GLB, | Performed by: PHYSICAL MEDICINE & REHABILITATION

## 2023-03-01 PROCEDURE — 1159F MED LIST DOCD IN RCRD: CPT | Mod: HCNC,CPTII,S$GLB, | Performed by: PHYSICAL MEDICINE & REHABILITATION

## 2023-03-01 PROCEDURE — 99204 OFFICE O/P NEW MOD 45 MIN: CPT | Mod: HCNC,S$GLB,, | Performed by: PHYSICAL MEDICINE & REHABILITATION

## 2023-03-01 PROCEDURE — 1101F PR PT FALLS ASSESS DOC 0-1 FALLS W/OUT INJ PAST YR: ICD-10-PCS | Mod: HCNC,CPTII,S$GLB, | Performed by: PHYSICAL MEDICINE & REHABILITATION

## 2023-03-01 RX ORDER — CYCLOBENZAPRINE HCL 10 MG
10 TABLET ORAL NIGHTLY PRN
Qty: 30 TABLET | Refills: 0 | Status: SHIPPED | OUTPATIENT
Start: 2023-03-01 | End: 2023-03-27

## 2023-03-01 NOTE — PROGRESS NOTES
SUBJECTIVE:    Patient ID: Zack Hopper is a 82 y.o. female.    Chief Complaint: Back Pain ((R) side)    This is an 82-year-old woman who sees Dr. Brooks for her primary care.  Other than hypertension she denies any chronic major medical problems.  No cancer history.  I note that she has history of left-sided low back pain at the lumbosacral junction and had very favorable outcome from left sacroiliac joint injections spanning from 12/2017-7/2019 with Dr. Lockett.  She had 4 procedures during that span.  She presents to me now however with a different complaint of right-sided back pain from the lower thoracic region to the lumbosacral junction.  This is been going on for about 2 months for no apparent reason.  There was no injury.  She denies radicular leg symptoms.  No bowel or bladder dysfunction fever chills sweats or unexpected weight loss.  She is been doing some exercises on her own which have not really helped but she is not had formal physical therapy.  Takes Advil up 3 tablets at a time which does help some.  She describes some difficulty sleeping due to pain.  Her current pain level is 8/10 with the pain at the thoracolumbar junction being the most bothersome.      I Reviewed an MRI of the lumbar spine from 06/24/2019 which is summarized below:    FINDINGS:  Five lumbar vertebral bodies.  Lumbar vertebral body heights are maintained.  Significant disc space loss at the levels of L1-L2 and L5-S1.  No acute fractures.  Grade 1 anterolisthesis of L4 on L5.  Mild retrolisthesis of L1 on L2.  Bone marrow signal intensity is within normal limits.  Conus terminates at L1.  Multiple Tarlov cysts are present, similar to prior examination.  Paraspinal soft tissues show mild fatty atrophy.     T12-L1: No spinal canal stenosis or neural foraminal narrowing.     L1-L2: Mild diffuse disc bulge and moderate bilateral facet arthropathy resulting in moderate left, severe right neural foraminal  narrowing and mild spinal canal stenosis.     L2-L3: Mild diffuse disc bulge and moderate bilateral facet arthropathy resulting in moderate bilateral neural foraminal narrowing and moderate spinal canal stenosis.     L3-L4: Mild diffuse disc bulge and moderate bilateral facet arthropathy with ligamentum flavum thickening resulting in mild right, moderate left neural foraminal narrowing and moderate spinal canal stenosis.     L4-L5: Mild diffuse disc bulge and moderate bilateral facet arthropathy with ligamentum flavum thickening resulting in mild bilateral neural foraminal narrowing and moderate spinal canal stenosis.  Small annular fissure present.     L5-S1: Mild diffuse disc bulge with moderate bilateral facet arthropathy resulting in severe bilateral neural foraminal narrowing and no spinal canal stenosis.  Small annular fracture present.     Impression:     Multilevel degenerative changes of the lumbar spine worst at the levels of L2-L3 and L4-L5, as discussed above.  No significant detrimental change from 2017 examination.        Past Medical History:   Diagnosis Date    DJD (degenerative joint disease)     Hyperlipidemia     Hypertension     Nuclear sclerosis - Both Eyes 2012    Rosacea     Vitamin D deficiency disease      Social History     Socioeconomic History    Marital status:    Tobacco Use    Smoking status: Never    Smokeless tobacco: Never   Substance and Sexual Activity    Alcohol use: Yes     Alcohol/week: 1.0 standard drink     Types: 1 Glasses of wine per week     Comment: approximately 2 glasses of wine weekly    Drug use: No    Sexual activity: Yes     Partners: Male     Past Surgical History:   Procedure Laterality Date    APPENDECTOMY      BREAST BIOPSY Left     2015    BREAST BIOPSY Right     10/2015 ex bx- papilloma     breast biopsy, left      10/2015     SECTION, CLASSIC      Three times    CHOLECYSTECTOMY      COLONOSCOPY N/A 10/1/2020    Procedure: COLONOSCOPY;   "Surgeon: Darek Martínez MD;  Location: [facility] ENDO (4TH FLR);  Service: Endoscopy;  Laterality: N/A;  9/28-covid uc met-tb    INJECTION OF ANESTHETIC AGENT INTO SACROILIAC JOINT Left 8/2/2018    Procedure: BLOCK, SACROILIAC JOINT- Left - ORAL SEDATION;  Surgeon: Ricci Lockett Jr., MD;  Location: [facility] PAIN MGT;  Service: Pain Management;  Laterality: Left;    INJECTION OF ANESTHETIC AGENT INTO SACROILIAC JOINT Left 7/30/2019    Procedure: BLOCK, SACROILIAC JOINT---Left W/Oral Sedation;  Surgeon: Ricci Lockett Jr., MD;  Location: [facility] PAIN MGT;  Service: Pain Management;  Laterality: Left;    JOINT REPLACEMENT Right     TKA    SHOULDER OPEN ROTATOR CUFF REPAIR Right     TOTAL KNEE ARTHROPLASTY      right knee     Family History   Problem Relation Age of Onset    Hypertension Mother     Stroke Father     Heart disease Father     Cataracts Sister         s/p cataracts surgery    Hypertension Sister     Migraines Sister     Breast cancer Sister 77        stage 4     Cancer Sister         breast    Diabetes Brother         d. 50's in [facility] (s/p BKA)    Kidney failure Brother         d.65    Kidney disease Brother         ESRD/Dialysis    Other Brother         d.34.s/p appendectomy    Diabetes Maternal Grandfather     No Known Problems Son         lives in [place]    No Known Problems Son         lives in [place], 4 children    No Known Problems Son         his son is Autistic 11,nonverbal, dtr 13 yrs older- lives [place]    Rectal cancer Neg Hx     Esophageal cancer Neg Hx     Colon cancer Neg Hx     Stomach cancer Neg Hx      Vitals:    03/01/23 1054   Weight: 70.3 kg (154 lb 15.7 oz)   Height: 5' 2" (1.575 m)       Review of Systems   Constitutional:  Negative for chills, diaphoresis, fatigue, fever and unexpected weight change.   HENT:  Negative for trouble swallowing.    Eyes:  Negative for visual disturbance.   Respiratory:  Negative for shortness of breath.    Cardiovascular:  Negative for chest pain. "   Gastrointestinal:  Negative for abdominal pain, constipation, nausea and vomiting.   Genitourinary:  Negative for difficulty urinating.   Musculoskeletal:  Negative for arthralgias, back pain, gait problem, joint swelling, myalgias, neck pain and neck stiffness.   Neurological:  Negative for dizziness, speech difficulty, weakness, light-headedness, numbness and headaches.        Objective:      Physical Exam  Neurological:      Mental Status: She is alert and oriented to person, place, and time.      Comments: She is awake and in no acute distress  Mild tenderness to palpation right lower thoracic and lumbar paraspinous musculature with no palpable masses  Forward flexion of the lumbar spine is normal and painless.  Extension of the lumbar spine causes pain at the thoracolumbar junction and lumbosacral junction.  No point tenderness over the sacroiliac joints  Reflexes- +1-+2 reflexes at the following:   C5-Biceps   C6-Brachioradialis   C7-Triceps   L3/4-Patellar   S1-Achilles   Jemal sign negative bilaterally  Strength testing- 5/5 strength in the following muscle groups:  C5-Elbow flexion  C6-Wrist extension  C7-Elbow extension  C8-Finger flexion  T1-Finger abduction  L2-Hip flexion  L3-Knee extension  L4-Ankle dorsiflexion  L5-Great toe extension  S1-Ankle plantar flexion       Straight leg raise negative bilaterally           Assessment:       1. Acute right-sided low back pain without sciatica           Plan:     She has a nonfocal neurological examination and no historical red flags.  I suspect she has back pain on basis of degenerative disc disease and facet arthropathy.  She has known multilevel degenerative disc disease of the lumbar spine.  I do not have any complete imaging on the thoracic spine.  I think she can be treated conservatively.  I am going to enroll her in the healthy back program.  Try Flexeril at night to help with sleep.  If she fails that consider updated MRIs including the thoracic  spine.  I will check on her by phone in about a week to see how she is doing with Flexeril otherwise follow-up at the completion of therapy      Acute right-sided low back pain without sciatica  -     Ambulatory referral/consult to [facility] Back; Future; Expected date: 03/08/2023    Other orders  -     cyclobenzaprine (FLEXERIL) 10 MG tablet; Take 1 tablet (10 mg total) by mouth nightly as needed for Muscle spasms.  Dispense: 30 tablet; Refill: 0

## 2023-03-06 ENCOUNTER — TELEPHONE (OUTPATIENT)
Dept: SPINE | Facility: CLINIC | Age: 83
End: 2023-03-06
Payer: MEDICARE

## 2023-03-06 NOTE — TELEPHONE ENCOUNTER
Returned call to patient who is requesting to schedule an injection as she reports therapy will not start for a month. Advised would forward to provider for review and response. Verbalizes understanding.

## 2023-03-06 NOTE — TELEPHONE ENCOUNTER
Spoke with patient to advise provider requesting appointment to discuss options, appt scheduled 3/7/2023 at 930 am, verbalizes understanding.

## 2023-03-06 NOTE — TELEPHONE ENCOUNTER
----- Message from Stacey M Lefort sent at 3/6/2023  8:28 AM CST -----  Pt would like a callback to discuss moving forward with injections. She can be reached at [phone]. Thank you.

## 2023-03-06 NOTE — TELEPHONE ENCOUNTER
In the past she has responded to left sacroiliac joint injection.  However her current complaints are on the right and too high to be attributed to the sacroiliac joints.  Medial branch blocks and radiofrequency ablation would be recommended for her current complaints.  I recommend she follow-up in the office to discuss what that entails prior to scheduling

## 2023-03-07 ENCOUNTER — OFFICE VISIT (OUTPATIENT)
Dept: SPINE | Facility: CLINIC | Age: 83
End: 2023-03-07
Payer: MEDICARE

## 2023-03-07 VITALS — BODY MASS INDEX: 28.52 KG/M2 | HEIGHT: 62 IN | WEIGHT: 155 LBS

## 2023-03-07 DIAGNOSIS — M54.50 ACUTE RIGHT-SIDED LOW BACK PAIN WITHOUT SCIATICA: Primary | ICD-10-CM

## 2023-03-07 PROCEDURE — 3288F FALL RISK ASSESSMENT DOCD: CPT | Mod: HCNC,CPTII,S$GLB, | Performed by: PHYSICAL MEDICINE & REHABILITATION

## 2023-03-07 PROCEDURE — 1160F PR REVIEW ALL MEDS BY PRESCRIBER/CLIN PHARMACIST DOCUMENTED: ICD-10-PCS | Mod: HCNC,CPTII,S$GLB, | Performed by: PHYSICAL MEDICINE & REHABILITATION

## 2023-03-07 PROCEDURE — 99213 PR OFFICE/OUTPT VISIT, EST, LEVL III, 20-29 MIN: ICD-10-PCS | Mod: HCNC,S$GLB,, | Performed by: PHYSICAL MEDICINE & REHABILITATION

## 2023-03-07 PROCEDURE — 1101F PT FALLS ASSESS-DOCD LE1/YR: CPT | Mod: HCNC,CPTII,S$GLB, | Performed by: PHYSICAL MEDICINE & REHABILITATION

## 2023-03-07 PROCEDURE — 1159F MED LIST DOCD IN RCRD: CPT | Mod: HCNC,CPTII,S$GLB, | Performed by: PHYSICAL MEDICINE & REHABILITATION

## 2023-03-07 PROCEDURE — 1125F AMNT PAIN NOTED PAIN PRSNT: CPT | Mod: HCNC,CPTII,S$GLB, | Performed by: PHYSICAL MEDICINE & REHABILITATION

## 2023-03-07 PROCEDURE — 1101F PR PT FALLS ASSESS DOC 0-1 FALLS W/OUT INJ PAST YR: ICD-10-PCS | Mod: HCNC,CPTII,S$GLB, | Performed by: PHYSICAL MEDICINE & REHABILITATION

## 2023-03-07 PROCEDURE — 99213 OFFICE O/P EST LOW 20 MIN: CPT | Mod: HCNC,S$GLB,, | Performed by: PHYSICAL MEDICINE & REHABILITATION

## 2023-03-07 PROCEDURE — 1125F PR PAIN SEVERITY QUANTIFIED, PAIN PRESENT: ICD-10-PCS | Mod: HCNC,CPTII,S$GLB, | Performed by: PHYSICAL MEDICINE & REHABILITATION

## 2023-03-07 PROCEDURE — 1160F RVW MEDS BY RX/DR IN RCRD: CPT | Mod: HCNC,CPTII,S$GLB, | Performed by: PHYSICAL MEDICINE & REHABILITATION

## 2023-03-07 PROCEDURE — 3288F PR FALLS RISK ASSESSMENT DOCUMENTED: ICD-10-PCS | Mod: HCNC,CPTII,S$GLB, | Performed by: PHYSICAL MEDICINE & REHABILITATION

## 2023-03-07 PROCEDURE — 1159F PR MEDICATION LIST DOCUMENTED IN MEDICAL RECORD: ICD-10-PCS | Mod: HCNC,CPTII,S$GLB, | Performed by: PHYSICAL MEDICINE & REHABILITATION

## 2023-03-07 NOTE — PROGRESS NOTES
SUBJECTIVE:    Patient ID: Zack Hopper is a 82 y.o. female.    Chief Complaint: Back Pain    She presents today to discuss other options for her right-sided low back pain.  [facility].  She wonders what she can do in the interim.  She is interested in injections.  I note she has historically had a good response to sacroiliac joint injections on the left.  Her current complaint is of pain on the right at the thoracolumbar junction.  I do not think that sacroiliac joint injections were going to help her with that issue.  Today we talked about medial branch blocks and radiofrequency ablation of the L1-2 and L2-3 facet joints on the right.  I gave her a pamphlet about that procedure.  Her current pain level is 5/10.  She says the Flexeril helps.  She has no new or progressive problems        Past Medical History:   Diagnosis Date    DJD (degenerative joint disease)     Hyperlipidemia     Hypertension     Nuclear sclerosis - Both Eyes 2012    Rosacea     Vitamin D deficiency disease      Social History     Socioeconomic History    Marital status:    Tobacco Use    Smoking status: Never    Smokeless tobacco: Never   Substance and Sexual Activity    Alcohol use: Yes     Alcohol/week: 1.0 standard drink     Types: 1 Glasses of wine per week     Comment: approximately 2 glasses of wine weekly    Drug use: No    Sexual activity: Yes     Partners: Male     Past Surgical History:   Procedure Laterality Date    APPENDECTOMY      BREAST BIOPSY Left     2015    BREAST BIOPSY Right     10/2015 ex bx- papilloma     breast biopsy, left      10/2015     SECTION, CLASSIC      Three times    CHOLECYSTECTOMY      COLONOSCOPY N/A 10/1/2020    Procedure: COLONOSCOPY;  Surgeon: Darek Martínez MD;  Location: [facility] ([address] [place] [address]);  Service: Endoscopy;  Laterality: N/A;  -covid uc met-tb    INJECTION OF ANESTHETIC AGENT INTO SACROILIAC JOINT Left  "8/2/2018    Procedure: BLOCK, SACROILIAC JOINT- Left - ORAL SEDATION;  Surgeon: Ricci Lockett Jr., MD;  Location: [facility] PAIN MGT;  Service: Pain Management;  Laterality: Left;    INJECTION OF ANESTHETIC AGENT INTO SACROILIAC JOINT Left 7/30/2019    Procedure: BLOCK, SACROILIAC JOINT---Left W/Oral Sedation;  Surgeon: Ricci Lockett Jr., MD;  Location: [facility] PAIN MGT;  Service: Pain Management;  Laterality: Left;    JOINT REPLACEMENT Right     TKA    SHOULDER OPEN ROTATOR CUFF REPAIR Right     TOTAL KNEE ARTHROPLASTY      right knee     Family History   Problem Relation Age of Onset    Hypertension Mother     Stroke Father     Heart disease Father     Cataracts Sister         s/p cataracts surgery    Hypertension Sister     Migraines Sister     Breast cancer Sister 77        stage 4     Cancer Sister         breast    Diabetes Brother         d. 50's in [facility] (s/p BKA)    Kidney failure Brother         d.65    Kidney disease Brother         ESRD/Dialysis    Other Brother         d.34.s/p appendectomy    Diabetes Maternal Grandfather     No Known Problems Son         lives in [place]    No Known Problems Son         lives in [place],  children    No Known Problems Son         his son is Autistic 11,nonverbal, dtr 13 yrs older- lives [place]    Rectal cancer Neg Hx     Esophageal cancer Neg Hx     Colon cancer Neg Hx     Stomach cancer Neg Hx      Vitals:    03/07/23 0924   Weight: 70.3 kg (154 lb 15.7 oz)   Height: 5' 2" (1.575 m)       Review of Systems   Constitutional:  Negative for chills, diaphoresis, fatigue, fever and unexpected weight change.   HENT:  Negative for trouble swallowing.    Eyes:  Negative for visual disturbance.   Respiratory:  Negative for shortness of breath.    Cardiovascular:  Negative for chest pain.   Gastrointestinal:  Negative for abdominal pain, constipation, nausea and vomiting.   Genitourinary:  Negative for difficulty urinating.   Musculoskeletal:  Negative for arthralgias, " back pain, gait problem, joint swelling, myalgias, neck pain and neck stiffness.   Neurological:  Negative for dizziness, speech difficulty, weakness, light-headedness, numbness and headaches.        Objective:      Physical Exam  Neurological:      Mental Status: She is alert and oriented to person, place, and time.           Assessment:       1. Acute right-sided low back pain without sciatica             Plan:     After discussion she has decided to try an external physical therapy department to see if they can get her in sooner.  If they can not she will let me know and I will get her set up for medial branch blocks and subsequent radiofrequency ablation as indicated of the right L1-2 and L2-3 facet joints.  I note that clinically she has pain on facet loading.  She has facet arthropathy on MRI.  She can follow up with me after therapy      Acute right-sided low back pain without sciatica  -     Ambulatory referral/consult to Physical/Occupational Therapy; Future; Expected date: 03/14/2023

## 2023-03-25 NOTE — PROGRESS NOTES
CC: f/up HTN, HLD, aortic atherosclerosis,   CKD II And insomnia  Vitamin D deficiency and IFBS    82 y.o. female presents for above   HTN, TX : chlorthalidone 25mg   CKD 2 ( was 3)  Denied HA or dizziness  BP today==>131/76    HLD/aortic atherosclerosis, tx rosuvastatin 10mg  - needs Rx udpate--  Denied angina or SOB  Denied claudication or LE discoloration      IFBS, tx diet  Denied increased thirst or urination    Vitamin D deficiency, tx supplement  Vitamin D level ==> 53 ( 1/2021)    Insomnia, tx:   Mind races, usually gets up and makes a list and usually helps      Back pain/ inflammatory spondylopathy-lumbar, exacerbated, patient now seeing pain management  Consideration for CORY and/or ablation  She has gotten some relief with Advil 200 mg 2 tablets 3 times daily  Concern for kidney function with prior CKD 3A  Patient has been trying to keep well hydrated  But still having difficulty at night where she does not sleep well  Flexeril was not effective  She has taken tramadol in the past with relief of pain symptoms with other musculoskeletal problems, will prescribe again        MEDCARD: Reviewed  ROS:  No fever, chills ,or night sweats  No chest pain or palpitations  No focal deficits or paresthesia  Remainder of review negative except as previously noted    PMHX: Reviewed  SHX: Reviewed  FHX: Reviewed    PE:  VSS:  GEN: WDWN, A&O, NAD, conversant and co-operative; pleasant as always    EYES: Conj/lids unremarkable, sclera anicteric  NECK: Supple w/o LN or TM  RESP: efforts unlabored, lungs CTA  CV: Heart RRR, no MGR, no carotid bruits noted  1+ pedal pulses, no LE edema  MSK: Gait normal. No CCE  SPINE: tender over the paraspinal area  NEURO: BALES. No tremor or facial asymmetry  SKIN: Warm and dry    IMPRESSION:  HTN, stable continue chlorthalidone 25 mg q.day  CKD 3, asx, continue vigorous hydration water being the best option and avoiding NSAIDs( pt now taking Advil, will need to check Cr)  HLD, asx,  continue rosuvastatin 10 mg q.day   Aortic atherosclerosis,asx   (Aorta, ascending- ULN-  US did not reveal an enlargement)  IFBS, asx, continue diabetic diet  Insomnia, stable,   --Vitamin D deficiency,   GERD/ GI irritation, had taken Pepcid, in effort to avoid PPI's ,   but not as effective  Will try 40mg famotidine hs      PLAN:  Lab- today  Continue present meds  Medication recommendations as noted above  Rx update  Trial famotidine 40 mg hs  Trial tramadol to see if back pain is ameliorated  Support pillow to use behind the back at night when she is having difficulty sleeping    Resume low salt diet  Resume exercise  Call prn  RTC 6 mos EPP

## 2023-03-27 ENCOUNTER — OFFICE VISIT (OUTPATIENT)
Dept: INTERNAL MEDICINE | Facility: CLINIC | Age: 83
End: 2023-03-27
Payer: MEDICARE

## 2023-03-27 ENCOUNTER — LAB VISIT (OUTPATIENT)
Dept: LAB | Facility: HOSPITAL | Age: 83
End: 2023-03-27
Attending: INTERNAL MEDICINE
Payer: MEDICARE

## 2023-03-27 VITALS
SYSTOLIC BLOOD PRESSURE: 131 MMHG | HEIGHT: 62 IN | OXYGEN SATURATION: 96 % | TEMPERATURE: 99 F | RESPIRATION RATE: 18 BRPM | WEIGHT: 159.38 LBS | BODY MASS INDEX: 29.33 KG/M2 | HEART RATE: 93 BPM | DIASTOLIC BLOOD PRESSURE: 76 MMHG

## 2023-03-27 DIAGNOSIS — N18.30 STAGE 3 CHRONIC KIDNEY DISEASE, UNSPECIFIED WHETHER STAGE 3A OR 3B CKD: ICD-10-CM

## 2023-03-27 DIAGNOSIS — I10 ESSENTIAL HYPERTENSION: ICD-10-CM

## 2023-03-27 DIAGNOSIS — M46.96 INFLAMMATORY SPONDYLOPATHY OF LUMBAR REGION: ICD-10-CM

## 2023-03-27 DIAGNOSIS — R73.01 IMPAIRED FASTING GLUCOSE: ICD-10-CM

## 2023-03-27 DIAGNOSIS — I10 ESSENTIAL HYPERTENSION: Primary | ICD-10-CM

## 2023-03-27 DIAGNOSIS — K21.9 GASTROESOPHAGEAL REFLUX DISEASE WITHOUT ESOPHAGITIS: ICD-10-CM

## 2023-03-27 DIAGNOSIS — E78.2 MIXED HYPERLIPIDEMIA: ICD-10-CM

## 2023-03-27 DIAGNOSIS — G47.00 INSOMNIA, UNSPECIFIED TYPE: ICD-10-CM

## 2023-03-27 DIAGNOSIS — I70.0 AORTIC ATHEROSCLEROSIS: ICD-10-CM

## 2023-03-27 LAB
ALBUMIN SERPL BCP-MCNC: 4.6 G/DL (ref 3.5–5.2)
ALP SERPL-CCNC: 70 U/L (ref 55–135)
ALT SERPL W/O P-5'-P-CCNC: 26 U/L (ref 10–44)
ANION GAP SERPL CALC-SCNC: 13 MMOL/L (ref 8–16)
AST SERPL-CCNC: 21 U/L (ref 10–40)
BASOPHILS # BLD AUTO: 0.09 K/UL (ref 0–0.2)
BASOPHILS NFR BLD: 1 % (ref 0–1.9)
BILIRUB SERPL-MCNC: 1.7 MG/DL (ref 0.1–1)
BUN SERPL-MCNC: 18 MG/DL (ref 8–23)
CALCIUM SERPL-MCNC: 10.5 MG/DL (ref 8.7–10.5)
CHLORIDE SERPL-SCNC: 99 MMOL/L (ref 95–110)
CO2 SERPL-SCNC: 28 MMOL/L (ref 23–29)
CREAT SERPL-MCNC: 1 MG/DL (ref 0.5–1.4)
DIFFERENTIAL METHOD: ABNORMAL
EOSINOPHIL # BLD AUTO: 0.3 K/UL (ref 0–0.5)
EOSINOPHIL NFR BLD: 3.6 % (ref 0–8)
ERYTHROCYTE [DISTWIDTH] IN BLOOD BY AUTOMATED COUNT: 13.1 % (ref 11.5–14.5)
EST. GFR  (NO RACE VARIABLE): 57 ML/MIN/1.73 M^2
GLUCOSE SERPL-MCNC: 112 MG/DL (ref 70–110)
HCT VFR BLD AUTO: 47.6 % (ref 37–48.5)
HGB BLD-MCNC: 15.8 G/DL (ref 12–16)
IMM GRANULOCYTES # BLD AUTO: 0.04 K/UL (ref 0–0.04)
IMM GRANULOCYTES NFR BLD AUTO: 0.5 % (ref 0–0.5)
LYMPHOCYTES # BLD AUTO: 1.9 K/UL (ref 1–4.8)
LYMPHOCYTES NFR BLD: 22.4 % (ref 18–48)
MCH RBC QN AUTO: 31.6 PG (ref 27–31)
MCHC RBC AUTO-ENTMCNC: 33.2 G/DL (ref 32–36)
MCV RBC AUTO: 95 FL (ref 82–98)
MONOCYTES # BLD AUTO: 0.7 K/UL (ref 0.3–1)
MONOCYTES NFR BLD: 8.5 % (ref 4–15)
NEUTROPHILS # BLD AUTO: 5.5 K/UL (ref 1.8–7.7)
NEUTROPHILS NFR BLD: 64 % (ref 38–73)
NRBC BLD-RTO: 0 /100 WBC
PLATELET # BLD AUTO: 212 K/UL (ref 150–450)
PMV BLD AUTO: 11 FL (ref 9.2–12.9)
POTASSIUM SERPL-SCNC: 4.1 MMOL/L (ref 3.5–5.1)
PROT SERPL-MCNC: 8.1 G/DL (ref 6–8.4)
RBC # BLD AUTO: 5 M/UL (ref 4–5.4)
SODIUM SERPL-SCNC: 140 MMOL/L (ref 136–145)
TSH SERPL DL<=0.005 MIU/L-ACNC: 1.7 UIU/ML (ref 0.4–4)
WBC # BLD AUTO: 8.66 K/UL (ref 3.9–12.7)

## 2023-03-27 PROCEDURE — 36415 COLL VENOUS BLD VENIPUNCTURE: CPT | Mod: HCNC,PO | Performed by: INTERNAL MEDICINE

## 2023-03-27 PROCEDURE — 99999 PR PBB SHADOW E&M-EST. PATIENT-LVL III: CPT | Mod: PBBFAC,HCNC,, | Performed by: INTERNAL MEDICINE

## 2023-03-27 PROCEDURE — 84443 ASSAY THYROID STIM HORMONE: CPT | Mod: HCNC | Performed by: INTERNAL MEDICINE

## 2023-03-27 PROCEDURE — 3078F PR MOST RECENT DIASTOLIC BLOOD PRESSURE < 80 MM HG: ICD-10-PCS | Mod: HCNC,CPTII,S$GLB, | Performed by: INTERNAL MEDICINE

## 2023-03-27 PROCEDURE — 1160F PR REVIEW ALL MEDS BY PRESCRIBER/CLIN PHARMACIST DOCUMENTED: ICD-10-PCS | Mod: HCNC,CPTII,S$GLB, | Performed by: INTERNAL MEDICINE

## 2023-03-27 PROCEDURE — 99999 PR PBB SHADOW E&M-EST. PATIENT-LVL III: ICD-10-PCS | Mod: PBBFAC,HCNC,, | Performed by: INTERNAL MEDICINE

## 2023-03-27 PROCEDURE — 99214 PR OFFICE/OUTPT VISIT, EST, LEVL IV, 30-39 MIN: ICD-10-PCS | Mod: HCNC,S$GLB,, | Performed by: INTERNAL MEDICINE

## 2023-03-27 PROCEDURE — 3075F PR MOST RECENT SYSTOLIC BLOOD PRESS GE 130-139MM HG: ICD-10-PCS | Mod: HCNC,CPTII,S$GLB, | Performed by: INTERNAL MEDICINE

## 2023-03-27 PROCEDURE — 80053 COMPREHEN METABOLIC PANEL: CPT | Mod: HCNC | Performed by: INTERNAL MEDICINE

## 2023-03-27 PROCEDURE — 3288F PR FALLS RISK ASSESSMENT DOCUMENTED: ICD-10-PCS | Mod: HCNC,CPTII,S$GLB, | Performed by: INTERNAL MEDICINE

## 2023-03-27 PROCEDURE — 1126F AMNT PAIN NOTED NONE PRSNT: CPT | Mod: HCNC,CPTII,S$GLB, | Performed by: INTERNAL MEDICINE

## 2023-03-27 PROCEDURE — 85025 COMPLETE CBC W/AUTO DIFF WBC: CPT | Mod: HCNC | Performed by: INTERNAL MEDICINE

## 2023-03-27 PROCEDURE — 3078F DIAST BP <80 MM HG: CPT | Mod: HCNC,CPTII,S$GLB, | Performed by: INTERNAL MEDICINE

## 2023-03-27 PROCEDURE — 3075F SYST BP GE 130 - 139MM HG: CPT | Mod: HCNC,CPTII,S$GLB, | Performed by: INTERNAL MEDICINE

## 2023-03-27 PROCEDURE — 1126F PR PAIN SEVERITY QUANTIFIED, NO PAIN PRESENT: ICD-10-PCS | Mod: HCNC,CPTII,S$GLB, | Performed by: INTERNAL MEDICINE

## 2023-03-27 PROCEDURE — 1159F PR MEDICATION LIST DOCUMENTED IN MEDICAL RECORD: ICD-10-PCS | Mod: HCNC,CPTII,S$GLB, | Performed by: INTERNAL MEDICINE

## 2023-03-27 PROCEDURE — 1101F PT FALLS ASSESS-DOCD LE1/YR: CPT | Mod: HCNC,CPTII,S$GLB, | Performed by: INTERNAL MEDICINE

## 2023-03-27 PROCEDURE — 99214 OFFICE O/P EST MOD 30 MIN: CPT | Mod: HCNC,S$GLB,, | Performed by: INTERNAL MEDICINE

## 2023-03-27 PROCEDURE — 1101F PR PT FALLS ASSESS DOC 0-1 FALLS W/OUT INJ PAST YR: ICD-10-PCS | Mod: HCNC,CPTII,S$GLB, | Performed by: INTERNAL MEDICINE

## 2023-03-27 PROCEDURE — 1160F RVW MEDS BY RX/DR IN RCRD: CPT | Mod: HCNC,CPTII,S$GLB, | Performed by: INTERNAL MEDICINE

## 2023-03-27 PROCEDURE — 3288F FALL RISK ASSESSMENT DOCD: CPT | Mod: HCNC,CPTII,S$GLB, | Performed by: INTERNAL MEDICINE

## 2023-03-27 PROCEDURE — 1159F MED LIST DOCD IN RCRD: CPT | Mod: HCNC,CPTII,S$GLB, | Performed by: INTERNAL MEDICINE

## 2023-03-27 RX ORDER — TRAMADOL HYDROCHLORIDE 50 MG/1
50 TABLET ORAL
Qty: 30 TABLET | Refills: 0 | Status: SHIPPED | OUTPATIENT
Start: 2023-03-27 | End: 2023-04-06

## 2023-03-27 RX ORDER — FAMOTIDINE 40 MG/1
40 TABLET, FILM COATED ORAL NIGHTLY
Qty: 90 TABLET | Refills: 3 | Status: SHIPPED | OUTPATIENT
Start: 2023-03-27 | End: 2023-11-01

## 2023-03-27 RX ORDER — IBUPROFEN 200 MG
400 TABLET ORAL EVERY 8 HOURS PRN
COMMUNITY
End: 2023-11-01

## 2023-03-28 ENCOUNTER — TELEPHONE (OUTPATIENT)
Dept: INTERNAL MEDICINE | Facility: CLINIC | Age: 83
End: 2023-03-28
Payer: MEDICARE

## 2023-03-28 NOTE — TELEPHONE ENCOUNTER
----- Message from Rosemarie Brooks MD sent at 3/28/2023  2:11 PM CDT -----  Your lab work has resulted  And your thyroid level is in the normal range.  Your CBC is unremarkable, you are not anemic.  Your chemistries revealed a slight increase in your blood glucose, and a slight decrease in your kidney function.  Please keep well hydrated, remember water is your best option.  Please try to avoid nonsteroidal anti-inflammatories as they can irritate the kidneys, and let us know if the tramadol is helping with your pain.  chloe

## 2023-04-18 ENCOUNTER — OFFICE VISIT (OUTPATIENT)
Dept: DERMATOLOGY | Facility: CLINIC | Age: 83
End: 2023-04-18
Payer: MEDICARE

## 2023-04-18 DIAGNOSIS — L81.4 LENTIGO: ICD-10-CM

## 2023-04-18 DIAGNOSIS — D18.01 CHERRY ANGIOMA: ICD-10-CM

## 2023-04-18 DIAGNOSIS — L82.1 SEBORRHEIC KERATOSES: Primary | ICD-10-CM

## 2023-04-18 DIAGNOSIS — L57.0 ACTINIC KERATOSIS: ICD-10-CM

## 2023-04-18 DIAGNOSIS — L57.8 ACTINIC SKIN DAMAGE: ICD-10-CM

## 2023-04-18 PROCEDURE — 1126F AMNT PAIN NOTED NONE PRSNT: CPT | Mod: HCNC,CPTII,S$GLB, | Performed by: STUDENT IN AN ORGANIZED HEALTH CARE EDUCATION/TRAINING PROGRAM

## 2023-04-18 PROCEDURE — 1126F PR PAIN SEVERITY QUANTIFIED, NO PAIN PRESENT: ICD-10-PCS | Mod: HCNC,CPTII,S$GLB, | Performed by: STUDENT IN AN ORGANIZED HEALTH CARE EDUCATION/TRAINING PROGRAM

## 2023-04-18 PROCEDURE — 1101F PT FALLS ASSESS-DOCD LE1/YR: CPT | Mod: HCNC,CPTII,S$GLB, | Performed by: STUDENT IN AN ORGANIZED HEALTH CARE EDUCATION/TRAINING PROGRAM

## 2023-04-18 PROCEDURE — 1160F PR REVIEW ALL MEDS BY PRESCRIBER/CLIN PHARMACIST DOCUMENTED: ICD-10-PCS | Mod: HCNC,CPTII,S$GLB, | Performed by: STUDENT IN AN ORGANIZED HEALTH CARE EDUCATION/TRAINING PROGRAM

## 2023-04-18 PROCEDURE — 99213 OFFICE O/P EST LOW 20 MIN: CPT | Mod: 25,HCNC,S$GLB, | Performed by: STUDENT IN AN ORGANIZED HEALTH CARE EDUCATION/TRAINING PROGRAM

## 2023-04-18 PROCEDURE — 17000 PR DESTRUCTION(LASER SURGERY,CRYOSURGERY,CHEMOSURGERY),PREMALIGNANT LESIONS,FIRST LESION: ICD-10-PCS | Mod: HCNC,S$GLB,, | Performed by: STUDENT IN AN ORGANIZED HEALTH CARE EDUCATION/TRAINING PROGRAM

## 2023-04-18 PROCEDURE — 17003 DESTRUCT PREMALG LES 2-14: CPT | Mod: HCNC,S$GLB,, | Performed by: STUDENT IN AN ORGANIZED HEALTH CARE EDUCATION/TRAINING PROGRAM

## 2023-04-18 PROCEDURE — 1101F PR PT FALLS ASSESS DOC 0-1 FALLS W/OUT INJ PAST YR: ICD-10-PCS | Mod: HCNC,CPTII,S$GLB, | Performed by: STUDENT IN AN ORGANIZED HEALTH CARE EDUCATION/TRAINING PROGRAM

## 2023-04-18 PROCEDURE — 99213 PR OFFICE/OUTPT VISIT, EST, LEVL III, 20-29 MIN: ICD-10-PCS | Mod: 25,HCNC,S$GLB, | Performed by: STUDENT IN AN ORGANIZED HEALTH CARE EDUCATION/TRAINING PROGRAM

## 2023-04-18 PROCEDURE — 17003 DESTRUCTION, PREMALIGNANT LESIONS; SECOND THROUGH 14 LESIONS: ICD-10-PCS | Mod: HCNC,S$GLB,, | Performed by: STUDENT IN AN ORGANIZED HEALTH CARE EDUCATION/TRAINING PROGRAM

## 2023-04-18 PROCEDURE — 1159F MED LIST DOCD IN RCRD: CPT | Mod: HCNC,CPTII,S$GLB, | Performed by: STUDENT IN AN ORGANIZED HEALTH CARE EDUCATION/TRAINING PROGRAM

## 2023-04-18 PROCEDURE — 17000 DESTRUCT PREMALG LESION: CPT | Mod: HCNC,S$GLB,, | Performed by: STUDENT IN AN ORGANIZED HEALTH CARE EDUCATION/TRAINING PROGRAM

## 2023-04-18 PROCEDURE — 3288F FALL RISK ASSESSMENT DOCD: CPT | Mod: HCNC,CPTII,S$GLB, | Performed by: STUDENT IN AN ORGANIZED HEALTH CARE EDUCATION/TRAINING PROGRAM

## 2023-04-18 PROCEDURE — 3288F PR FALLS RISK ASSESSMENT DOCUMENTED: ICD-10-PCS | Mod: HCNC,CPTII,S$GLB, | Performed by: STUDENT IN AN ORGANIZED HEALTH CARE EDUCATION/TRAINING PROGRAM

## 2023-04-18 PROCEDURE — 99999 PR PBB SHADOW E&M-EST. PATIENT-LVL II: CPT | Mod: PBBFAC,HCNC,, | Performed by: STUDENT IN AN ORGANIZED HEALTH CARE EDUCATION/TRAINING PROGRAM

## 2023-04-18 PROCEDURE — 1159F PR MEDICATION LIST DOCUMENTED IN MEDICAL RECORD: ICD-10-PCS | Mod: HCNC,CPTII,S$GLB, | Performed by: STUDENT IN AN ORGANIZED HEALTH CARE EDUCATION/TRAINING PROGRAM

## 2023-04-18 PROCEDURE — 1160F RVW MEDS BY RX/DR IN RCRD: CPT | Mod: HCNC,CPTII,S$GLB, | Performed by: STUDENT IN AN ORGANIZED HEALTH CARE EDUCATION/TRAINING PROGRAM

## 2023-04-18 PROCEDURE — 99999 PR PBB SHADOW E&M-EST. PATIENT-LVL II: ICD-10-PCS | Mod: PBBFAC,HCNC,, | Performed by: STUDENT IN AN ORGANIZED HEALTH CARE EDUCATION/TRAINING PROGRAM

## 2023-04-18 NOTE — PROGRESS NOTES
Subjective:      Patient ID:  Zack Hopper is a 82 y.o. female who presents for   Chief Complaint   Patient presents with    Spot     Leg and right scalp      LOV 3/31/21 [facility]     Patient here today spots on legs x years. Sometimes will itch. Would like them removed.    Also complains of spot on right scalp x years. Not bothersome.     Denies Phx of NMSC  Denies Fhx of MM      Review of Systems   Constitutional:  Negative for fever, chills and fatigue.   Respiratory:  Negative for cough and shortness of breath.    Gastrointestinal:  Negative for nausea and vomiting.   Skin:  Positive for daily sunscreen use and activity-related sunscreen use. Negative for sensitivity to antibiotic ointment.   Hematologic/Lymphatic: Bruises/bleeds easily.     Objective:   Physical Exam   Constitutional: She appears well-developed and well-nourished. No distress.   Neurological: She is alert and oriented to person, place, and time. She is not disoriented.   Psychiatric: She has a normal mood and affect.   Skin:   Areas Examined (abnormalities noted in diagram):   Scalp / Hair Palpated and Inspected  Head / Face Inspection Performed  Neck Inspection Performed  Chest / Axilla Inspection Performed  Abdomen Inspection Performed  Genitals / Buttocks / Groin Inspection Performed  Back Inspection Performed  RUE Inspected  LUE Inspection Performed  RLE Inspected  LLE Inspection Performed  Nails and Digits Inspection Performed               Diagram Legend     Erythematous scaling macule/papule c/w actinic keratosis       Vascular papule c/w angioma      Pigmented verrucoid papule/plaque c/w seborrheic keratosis      Yellow umbilicated papule c/w sebaceous hyperplasia      Irregularly shaped tan macule c/w lentigo     1-2 mm smooth white papules consistent with Milia      Movable subcutaneous cyst with punctum c/w epidermal inclusion cyst      Subcutaneous movable cyst c/w pilar cyst      Firm pink to brown papule c/w dermatofibroma       Pedunculated fleshy papule(s) c/w skin tag(s)      Evenly pigmented macule c/w junctional nevus     Mildly variegated pigmented, slightly irregular-bordered macule c/w mildly atypical nevus      Flesh colored to evenly pigmented papule c/w intradermal nevus       Pink pearly papule/plaque c/w basal cell carcinoma      Erythematous hyperkeratotic cursted plaque c/w SCC      Surgical scar with no sign of skin cancer recurrence      Open and closed comedones      Inflammatory papules and pustules      Verrucoid papule consistent consistent with wart     Erythematous eczematous patches and plaques     Dystrophic onycholytic nail with subungual debris c/w onychomycosis     Umbilicated papule    Erythematous-base heme-crusted tan verrucoid plaque consistent with inflamed seborrheic keratosis     Erythematous Silvery Scaling Plaque c/w Psoriasis     See annotation      Assessment / Plan:        Seborrheic keratoses  These are benign inherited growths without a malignant potential. Reassurance given to patient. No treatment is necessary.     Actinic skin damage  Total body skin examination performed today including at least 12 points as noted in physical examination. No lesions suspicious for malignancy noted.  Patient instructed in importance in daily broad spectrum sun protection of at least spf 30. Mineral sunscreen ingredients preferred (Zinc +/- Titanium) and can be found OTC.   Patient encouraged to wear hat for all outdoor exposure.   Also discussed sun avoidance and use of protective clothing.    Lentigo  This is a benign hyperpigmented sun induced lesion. Daily sun protection will reduce the number of new lesions. Treatment of these benign lesions are considered cosmetic.    Actinic keratosis  Cryosurgery Procedure Note    Verbal consent from the patient is obtained and the patient is aware of the precancerous quality and need for treatment of these lesions. Liquid nitrogen cryosurgery is applied to the 5  actinic keratoses, as detailed in the physical exam, to produce a freeze injury. The patient is aware that blisters may form and is instructed on wound care with gentle cleansing and use of vaseline ointment to keep moist until healed. The patient is supplied a handout on cryosurgery and is instructed to call if lesions do not completely resolve.    Cherry angioma  This is a benign vascular lesion. Reassurance given. No treatment required.            1 year  No follow-ups on file.

## 2023-04-18 NOTE — PATIENT INSTRUCTIONS

## 2023-05-31 ENCOUNTER — PES CALL (OUTPATIENT)
Dept: ADMINISTRATIVE | Facility: CLINIC | Age: 83
End: 2023-05-31
Payer: MEDICARE

## 2023-07-24 ENCOUNTER — TELEPHONE (OUTPATIENT)
Dept: INTERNAL MEDICINE | Facility: CLINIC | Age: 83
End: 2023-07-24
Payer: MEDICARE

## 2023-07-24 DIAGNOSIS — D64.9 ANEMIA, UNSPECIFIED TYPE: ICD-10-CM

## 2023-07-24 DIAGNOSIS — I10 ESSENTIAL HYPERTENSION: Primary | ICD-10-CM

## 2023-07-24 DIAGNOSIS — R73.01 IMPAIRED FASTING GLUCOSE: ICD-10-CM

## 2023-07-24 DIAGNOSIS — E55.9 VITAMIN D DEFICIENCY: ICD-10-CM

## 2023-07-24 DIAGNOSIS — E78.2 MIXED HYPERLIPIDEMIA: ICD-10-CM

## 2023-07-24 NOTE — TELEPHONE ENCOUNTER
----- Message from Ema Walker sent at 7/24/2023  2:41 PM CDT -----  Regarding: lab orders  Contact: Pt  Pt is requesting a  callback regarding lab orders ad appt. Please adv pt       Confirmed contact below:   Contact Name:Zack Hopper  Phone Number: [phone]

## 2023-07-31 DIAGNOSIS — M25.512 LEFT SHOULDER PAIN, UNSPECIFIED CHRONICITY: Primary | ICD-10-CM

## 2023-08-10 ENCOUNTER — OFFICE VISIT (OUTPATIENT)
Dept: ORTHOPEDICS | Facility: CLINIC | Age: 83
End: 2023-08-10
Payer: MEDICARE

## 2023-08-10 ENCOUNTER — HOSPITAL ENCOUNTER (OUTPATIENT)
Dept: RADIOLOGY | Facility: HOSPITAL | Age: 83
Discharge: HOME | End: 2023-08-10
Attending: ORTHOPAEDIC SURGERY
Payer: MEDICARE

## 2023-08-10 VITALS — HEIGHT: 62 IN | RESPIRATION RATE: 18 BRPM | WEIGHT: 159 LBS | BODY MASS INDEX: 29.26 KG/M2

## 2023-08-10 DIAGNOSIS — M17.10 ARTHRITIS OF KNEE: ICD-10-CM

## 2023-08-10 DIAGNOSIS — M25.512 LEFT SHOULDER PAIN, UNSPECIFIED CHRONICITY: ICD-10-CM

## 2023-08-10 DIAGNOSIS — M75.100 ROTATOR CUFF SYNDROME, UNSPECIFIED LATERALITY: Primary | ICD-10-CM

## 2023-08-10 PROCEDURE — 1125F PR PAIN SEVERITY QUANTIFIED, PAIN PRESENT: ICD-10-PCS | Mod: HCNC,CPTII,S$GLB,ICN | Performed by: ORTHOPAEDIC SURGERY

## 2023-08-10 PROCEDURE — 73030 X-RAY EXAM OF SHOULDER: CPT | Mod: 26,HCNC,LT, | Performed by: RADIOLOGY

## 2023-08-10 PROCEDURE — 1101F PR PT FALLS ASSESS DOC 0-1 FALLS W/OUT INJ PAST YR: ICD-10-PCS | Mod: HCNC,CPTII,S$GLB,ICN | Performed by: ORTHOPAEDIC SURGERY

## 2023-08-10 PROCEDURE — 99999 PR PBB SHADOW E&M-EST. PATIENT-LVL III: ICD-10-PCS | Mod: PBBFAC,HCNC,, | Performed by: ORTHOPAEDIC SURGERY

## 2023-08-10 PROCEDURE — 1159F PR MEDICATION LIST DOCUMENTED IN MEDICAL RECORD: ICD-10-PCS | Mod: HCNC,CPTII,S$GLB,ICN | Performed by: ORTHOPAEDIC SURGERY

## 2023-08-10 PROCEDURE — 3288F PR FALLS RISK ASSESSMENT DOCUMENTED: ICD-10-PCS | Mod: HCNC,CPTII,S$GLB,ICN | Performed by: ORTHOPAEDIC SURGERY

## 2023-08-10 PROCEDURE — 1160F PR REVIEW ALL MEDS BY PRESCRIBER/CLIN PHARMACIST DOCUMENTED: ICD-10-PCS | Mod: HCNC,CPTII,S$GLB,ICN | Performed by: ORTHOPAEDIC SURGERY

## 2023-08-10 PROCEDURE — 1101F PT FALLS ASSESS-DOCD LE1/YR: CPT | Mod: HCNC,CPTII,S$GLB,ICN | Performed by: ORTHOPAEDIC SURGERY

## 2023-08-10 PROCEDURE — 99214 OFFICE O/P EST MOD 30 MIN: CPT | Mod: 25,HCNC,S$GLB,ICN | Performed by: ORTHOPAEDIC SURGERY

## 2023-08-10 PROCEDURE — 1159F MED LIST DOCD IN RCRD: CPT | Mod: HCNC,CPTII,S$GLB,ICN | Performed by: ORTHOPAEDIC SURGERY

## 2023-08-10 PROCEDURE — 73030 X-RAY EXAM OF SHOULDER: CPT | Mod: TC,HCNC,PO,LT

## 2023-08-10 PROCEDURE — 73030 XR SHOULDER TRAUMA 3 VIEW LEFT: ICD-10-PCS | Mod: 26,HCNC,LT, | Performed by: RADIOLOGY

## 2023-08-10 PROCEDURE — 99214 PR OFFICE/OUTPT VISIT, EST, LEVL IV, 30-39 MIN: ICD-10-PCS | Mod: 25,HCNC,S$GLB,ICN | Performed by: ORTHOPAEDIC SURGERY

## 2023-08-10 PROCEDURE — 20610 DRAIN/INJ JOINT/BURSA W/O US: CPT | Mod: HCNC,LT,S$GLB,ICN | Performed by: ORTHOPAEDIC SURGERY

## 2023-08-10 PROCEDURE — 3288F FALL RISK ASSESSMENT DOCD: CPT | Mod: HCNC,CPTII,S$GLB,ICN | Performed by: ORTHOPAEDIC SURGERY

## 2023-08-10 PROCEDURE — 99999 PR PBB SHADOW E&M-EST. PATIENT-LVL III: CPT | Mod: PBBFAC,HCNC,, | Performed by: ORTHOPAEDIC SURGERY

## 2023-08-10 PROCEDURE — 1160F RVW MEDS BY RX/DR IN RCRD: CPT | Mod: HCNC,CPTII,S$GLB,ICN | Performed by: ORTHOPAEDIC SURGERY

## 2023-08-10 PROCEDURE — 20610 LARGE JOINT ASPIRATION/INJECTION: L KNEE: ICD-10-PCS | Mod: HCNC,LT,S$GLB,ICN | Performed by: ORTHOPAEDIC SURGERY

## 2023-08-10 PROCEDURE — 1125F AMNT PAIN NOTED PAIN PRSNT: CPT | Mod: HCNC,CPTII,S$GLB,ICN | Performed by: ORTHOPAEDIC SURGERY

## 2023-08-10 RX ORDER — TRIAMCINOLONE ACETONIDE 40 MG/ML
40 INJECTION, SUSPENSION INTRA-ARTICULAR; INTRAMUSCULAR
Status: DISCONTINUED | OUTPATIENT
Start: 2023-08-10 | End: 2023-08-10 | Stop reason: HOSPADM

## 2023-08-10 RX ADMIN — TRIAMCINOLONE ACETONIDE 40 MG: 40 INJECTION, SUSPENSION INTRA-ARTICULAR; INTRAMUSCULAR at 09:08

## 2023-08-10 NOTE — PROCEDURES
Large Joint Aspiration/Injection: L knee    Date/Time: 8/10/2023 9:30 AM    Performed by: Efrain Mejia MD  Authorized by: Efrain Mejia MD    Consent Done?:  Yes (Verbal)  Indications:  Pain  Site marked: the procedure site was marked    Timeout: prior to procedure the correct patient, procedure, and site was verified    Local anesthetic:  Lidocaine 1% without epinephrine and bupivacaine 0.25% without epinephrine  Anesthetic total (ml):  6      Details:  Needle Size:  20 G  Approach:  Anterolateral  Location:  Knee  Site:  L knee  Medications:  40 mg triamcinolone acetonide 40 mg/mL  Patient tolerance:  Patient tolerated the procedure well with no immediate complications

## 2023-08-10 NOTE — PROGRESS NOTES
Past Medical History:   Diagnosis Date    DJD (degenerative joint disease)     Hyperlipidemia     Hypertension     Nuclear sclerosis - Both Eyes 2012    Rosacea     Vitamin D deficiency disease        Past Surgical History:   Procedure Laterality Date    APPENDECTOMY      BREAST BIOPSY Left     2015    BREAST BIOPSY Right     10/2015 ex bx- papilloma     breast biopsy, left      10/2015     SECTION, CLASSIC      Three times    CHOLECYSTECTOMY      COLONOSCOPY N/A 10/1/2020    Procedure: COLONOSCOPY;  Surgeon: Darek Martínez MD;  Location: [facility] ENDO (4TH FLR);  Service: Endoscopy;  Laterality: N/A;  -covid uc met-tb    INJECTION OF ANESTHETIC AGENT INTO SACROILIAC JOINT Left 2018    Procedure: BLOCK, SACROILIAC JOINT- Left - ORAL SEDATION;  Surgeon: Ricci Lockett Jr., MD;  Location: [facility] PAIN MGT;  Service: Pain Management;  Laterality: Left;    INJECTION OF ANESTHETIC AGENT INTO SACROILIAC JOINT Left 2019    Procedure: BLOCK, SACROILIAC JOINT---Left W/Oral Sedation;  Surgeon: Ricci Lockett Jr., MD;  Location: [facility] PAIN MGT;  Service: Pain Management;  Laterality: Left;    JOINT REPLACEMENT Right     TKA    SHOULDER OPEN ROTATOR CUFF REPAIR Right     TOTAL KNEE ARTHROPLASTY      right knee       Current Outpatient Medications   Medication Sig    chlorthalidone (HYGROTEN) 25 MG Tab TAKE 1 TABLET  BY MOUTH ONCE DAILY.    famotidine (PEPCID) 40 MG tablet Take 1 tablet (40 mg total) by mouth every evening.    ibuprofen (ADVIL,MOTRIN) 200 MG tablet Take 400 mg by mouth every 8 (eight) hours as needed for Pain.    rosuvastatin (CRESTOR) 10 MG tablet TAKE 1 TABLET EVERY DAY    vitamin D 1000 units Tab Take 1,000 Units by mouth once daily.     No current facility-administered medications for this visit.       Review of patient's allergies indicates:   Allergen Reactions    Sulfacetamide      Other reaction(s): Unknown       Family History   Problem Relation Age of Onset    Hypertension Mother      Stroke Father     Heart disease Father     Cataracts Sister         s/p cataracts surgery    Hypertension Sister     Migraines Sister     Breast cancer Sister 77        stage 4     Cancer Sister         breast    Diabetes Brother         d. 50's in MVA (s/p BKA)    Kidney failure Brother         d.65    Kidney disease Brother         ESRD/Dialysis    Other Brother         d.34.s/p appendectomy    Diabetes Maternal Grandfather     No Known Problems Son         lives in [place]    No Known Problems Son         lives in [place], 4 children    No Known Problems Son         his son is Autistic 11,nonverbal, dtr 13 yrs older- lives [place]    Rectal cancer Neg Hx     Esophageal cancer Neg Hx     Colon cancer Neg Hx     Stomach cancer Neg Hx        Social History     Socioeconomic History    Marital status:    Tobacco Use    Smoking status: Never    Smokeless tobacco: Never   Substance and Sexual Activity    Alcohol use: Yes     Alcohol/week: 1.0 standard drink of alcohol     Types: 1 Glasses of wine per week     Comment: approximately 2 glasses of wine weekly    Drug use: No    Sexual activity: Yes     Partners: Male       Chief Complaint:   Chief Complaint   Patient presents with    Shoulder Pain     eval left shoulder     Follow-up     follow up left knee        History of present illness:  83-year-old right-hand-dominant female seen for a new complaint of left shoulder pain that started back in June.  No injury or trauma.  Pain reaching behind her back.  Pain doing her hair and particularly at night.  Patient has tried Mobic without improvement.  Pain is a 2/10.  Patient is also having worsening left knee pain.  Last treatment was a cortisone injection in December for some knee arthritis.      Review of Systems:    Constitution: Negative for chills, fever, and sweats.  Negative for unexplained weight loss.    HENT:  Negative for headaches and blurry vision.    Cardiovascular:Negative for chest pain or  irregular heart beat. Negative for hypertension.    Respiratory:  Negative for cough and shortness of breath.    Gastrointestinal: Negative for abdominal pain, heartburn, melena, nausea, and vomitting.    Genitourinary:  Negative bladder incontinence and dysuria.    Musculoskeletal:  See HPI    Neurological: Negative for numbness.    Psychiatric/Behavioral: Negative for depression.  The patient is not nervous/anxious.      Endocrine: Negative for polyuria    Hematologic/Lymphatic: Negative for bleeding problem.  Does not bruise/bleed easily.    Skin: Negative for poor would healing and rash      Physical Examination:    Vital Signs:    Vitals:    08/10/23 0926   Resp: 18       Body mass index is 29.08 kg/m².    This a well-developed, well nourished patient in no acute distress.  They are alert and oriented and cooperative to examination.  Pt. walks without an antalgic gait.      Examination of the left shoulder shows no rashes or erythema. There are no masses, ecchymosis, or atrophy. The patient has full range of motion in forward flexion, external rotation, and internal rotation to the mid T-spine. The patient has positive Cooper and Neer test. - Standard's test. - Speeds test. Nontender to palpation over a.c. joint. Normal stability anteriorly, posteriorly, and negative sulcus sign. Passive range of motion: Forward flexion of 180°, external rotation at 90° of 90°, internal rotation of 50°, and external rotation at 0° of 50°. 2+ radial pulse. Intact axillary, radial, median and ulnar sensation.  4+ out of 5 resisted forward flexion, external rotation, and negative lift off test.    Examination of the left knee shows no rashes or erythema. There are no masses ecchymosis or effusion. Patient has full range of motion from 0-130°. Patient is nontender to palpation over lateral joint line and nontender to palpation over the medial joint line. Patient has a - Lachman exam, - anterior drawer exam, and - posterior drawer  exam. - Apley exam. Knee is stable to varus and valgus stress. 5 out of 5 motor strength. Palpable distal pulses. Intact light touch sensation. Negative Patellofemoral crepitus      X-rays:  X-rays left shoulder ordered and reviewed which show some spurring at the inferior portion of the glenohumeral joint consistent with some early arthritis.  Patient also degenerative change of the greater tuberosity consistent with chronic rotator cuff pathology     Assessment::  Left rotator cuff syndrome   Left knee arthritis    Plan:  I reviewed the findings with her today.  We talked about treatment options for her rotator cuff syndrome.  I recommended a shoulder conditioning guide and a Thera-Band.  We talked about modifying her exercise routine.  I agreed to give her knee 1 more steroid injection.  Hopefully this will help her shoulder as well.  We talked about getting an MRI of her left shoulder if it continues to bother her.    All previous pertinent notes including ER visits, physical therapy visits, other orthopedic visits as well as other care for the same musculoskeletal problem were reviewed.  All pertinent lab values and previous imaging was reviewed pertinent to the current visit.    This note was created using [employer] voice recognition software that occasionally misinterpreted phrases or words.    Consult note is delivered via Epic messaging service.

## 2023-08-17 ENCOUNTER — OFFICE VISIT (OUTPATIENT)
Dept: OPTOMETRY | Facility: CLINIC | Age: 83
End: 2023-08-17
Payer: COMMERCIAL

## 2023-08-17 DIAGNOSIS — Z13.5 GLAUCOMA SCREENING: ICD-10-CM

## 2023-08-17 DIAGNOSIS — H52.03 HYPEROPIA WITH PRESBYOPIA OF BOTH EYES: ICD-10-CM

## 2023-08-17 DIAGNOSIS — H52.4 HYPEROPIA WITH PRESBYOPIA OF BOTH EYES: ICD-10-CM

## 2023-08-17 DIAGNOSIS — H25.13 NUCLEAR SCLEROSIS, BILATERAL: Primary | ICD-10-CM

## 2023-08-17 PROCEDURE — 99999 PR PBB SHADOW E&M-EST. PATIENT-LVL III: ICD-10-PCS | Mod: PBBFAC,,, | Performed by: OPTOMETRIST

## 2023-08-17 PROCEDURE — 92014 PR EYE EXAM, EST PATIENT,COMPREHESV: ICD-10-PCS | Mod: S$GLB,,, | Performed by: OPTOMETRIST

## 2023-08-17 PROCEDURE — 99999 PR PBB SHADOW E&M-EST. PATIENT-LVL III: CPT | Mod: PBBFAC,,, | Performed by: OPTOMETRIST

## 2023-08-17 PROCEDURE — 92015 DETERMINE REFRACTIVE STATE: CPT | Mod: S$GLB,,, | Performed by: OPTOMETRIST

## 2023-08-17 PROCEDURE — 92015 PR REFRACTION: ICD-10-PCS | Mod: S$GLB,,, | Performed by: OPTOMETRIST

## 2023-08-17 PROCEDURE — 92014 COMPRE OPH EXAM EST PT 1/>: CPT | Mod: S$GLB,,, | Performed by: OPTOMETRIST

## 2023-08-17 NOTE — PROGRESS NOTES
HPI    84 Y/o female is here for routine eye exam with no C/o about ocular health   pt wears otc readers for both reading and distance   Pt denies pain and discomfort    No f/f    Eye med: REfresh OU PRN   Last edited by Jose Ross MA on 8/17/2023 12:49 PM.            Assessment /Plan     For exam results, see Encounter Report.    Nuclear sclerosis, bilateral    Glaucoma screening    Hyperopia with presbyopia of both eyes      Cat OD>OS--discussed surgery, but pt wishes to wait.  For now happy w otc +2.50 for distance.  Uses otc +4.00 for near, but may do better w +4.50    PLAN:    Rtc 1 yr, or will call sooner if wishes cat eval (her  had Dr Lopez, but they live in [place] now, and may be interested in seeing Dr Velasquez for convenience)

## 2023-08-23 ENCOUNTER — TELEPHONE (OUTPATIENT)
Dept: INTERNAL MEDICINE | Facility: CLINIC | Age: 83
End: 2023-08-23
Payer: MEDICARE

## 2023-08-23 NOTE — TELEPHONE ENCOUNTER
----- Message from Meghan Moreno sent at 8/22/2023  4:32 PM CDT -----  Contact: p. [phone]  Patient would like to discuss about the appt that she will has on 10/20/2023.      Please call and advise.

## 2023-08-23 NOTE — TELEPHONE ENCOUNTER
Spoke to pt. Pt stated that she will be out of town on 10/20/23. Pt requesting to reschedule to another date.  Pt rescheduled for 11/1/23 with Dr. Perez.

## 2023-10-05 ENCOUNTER — TELEPHONE (OUTPATIENT)
Dept: INTERNAL MEDICINE | Facility: CLINIC | Age: 83
End: 2023-10-05
Payer: MEDICARE

## 2023-10-05 NOTE — TELEPHONE ENCOUNTER
Spoke to pt. Pt stated that she had originally been scheduled to see Dr. Perez on 10/20/23, but had to reschedule for a later date, because she was going to be out of town. She stated that she is no longer leaving town, and wanted to know if the original date was still available.  I advised that 10/20/23 is no longer available.  Pt stated that she will just keep her scheduled appointment for 11/1/23.

## 2023-10-05 NOTE — TELEPHONE ENCOUNTER
----- Message from Lobito Mehta sent at 10/5/2023 11:01 AM CDT -----  Contact: self [phone]  Pt requesting a call.    Please call and advise

## 2023-10-12 ENCOUNTER — LAB VISIT (OUTPATIENT)
Dept: LAB | Facility: HOSPITAL | Age: 83
End: 2023-10-12
Attending: INTERNAL MEDICINE
Payer: MEDICARE

## 2023-10-12 DIAGNOSIS — D64.9 ANEMIA, UNSPECIFIED TYPE: ICD-10-CM

## 2023-10-12 DIAGNOSIS — I10 ESSENTIAL HYPERTENSION: ICD-10-CM

## 2023-10-12 DIAGNOSIS — R73.01 IMPAIRED FASTING GLUCOSE: ICD-10-CM

## 2023-10-12 DIAGNOSIS — E55.9 VITAMIN D DEFICIENCY: ICD-10-CM

## 2023-10-12 DIAGNOSIS — E78.2 MIXED HYPERLIPIDEMIA: ICD-10-CM

## 2023-10-12 LAB
25(OH)D3+25(OH)D2 SERPL-MCNC: 36 NG/ML (ref 30–96)
ALBUMIN SERPL BCP-MCNC: 4.7 G/DL (ref 3.5–5.2)
ALP SERPL-CCNC: 71 U/L (ref 55–135)
ALT SERPL W/O P-5'-P-CCNC: 37 U/L (ref 10–44)
ANION GAP SERPL CALC-SCNC: 9 MMOL/L (ref 8–16)
AST SERPL-CCNC: 32 U/L (ref 10–40)
BASOPHILS # BLD AUTO: 0.05 K/UL (ref 0–0.2)
BASOPHILS NFR BLD: 0.8 % (ref 0–1.9)
BILIRUB SERPL-MCNC: 2 MG/DL (ref 0.1–1)
BUN SERPL-MCNC: 21 MG/DL (ref 8–23)
CALCIUM SERPL-MCNC: 10.3 MG/DL (ref 8.7–10.5)
CHLORIDE SERPL-SCNC: 100 MMOL/L (ref 95–110)
CHOLEST SERPL-MCNC: 152 MG/DL (ref 120–199)
CHOLEST/HDLC SERPL: 2.8 {RATIO} (ref 2–5)
CO2 SERPL-SCNC: 31 MMOL/L (ref 23–29)
CREAT SERPL-MCNC: 1 MG/DL (ref 0.5–1.4)
DIFFERENTIAL METHOD: ABNORMAL
EOSINOPHIL # BLD AUTO: 0.1 K/UL (ref 0–0.5)
EOSINOPHIL NFR BLD: 2.2 % (ref 0–8)
ERYTHROCYTE [DISTWIDTH] IN BLOOD BY AUTOMATED COUNT: 12.8 % (ref 11.5–14.5)
EST. GFR  (NO RACE VARIABLE): 56.6 ML/MIN/1.73 M^2
ESTIMATED AVG GLUCOSE: 117 MG/DL (ref 68–131)
GLUCOSE SERPL-MCNC: 128 MG/DL (ref 70–110)
HBA1C MFR BLD: 5.7 % (ref 4–5.6)
HCT VFR BLD AUTO: 44.6 % (ref 37–48.5)
HDLC SERPL-MCNC: 54 MG/DL (ref 40–75)
HDLC SERPL: 35.5 % (ref 20–50)
HGB BLD-MCNC: 15.2 G/DL (ref 12–16)
IMM GRANULOCYTES # BLD AUTO: 0.02 K/UL (ref 0–0.04)
IMM GRANULOCYTES NFR BLD AUTO: 0.3 % (ref 0–0.5)
IRON SERPL-MCNC: 127 UG/DL (ref 30–160)
LDLC SERPL CALC-MCNC: 77.6 MG/DL (ref 63–159)
LYMPHOCYTES # BLD AUTO: 1.7 K/UL (ref 1–4.8)
LYMPHOCYTES NFR BLD: 27 % (ref 18–48)
MCH RBC QN AUTO: 32.1 PG (ref 27–31)
MCHC RBC AUTO-ENTMCNC: 34.1 G/DL (ref 32–36)
MCV RBC AUTO: 94 FL (ref 82–98)
MONOCYTES # BLD AUTO: 0.7 K/UL (ref 0.3–1)
MONOCYTES NFR BLD: 10.5 % (ref 4–15)
NEUTROPHILS # BLD AUTO: 3.8 K/UL (ref 1.8–7.7)
NEUTROPHILS NFR BLD: 59.2 % (ref 38–73)
NONHDLC SERPL-MCNC: 98 MG/DL
NRBC BLD-RTO: 0 /100 WBC
PLATELET # BLD AUTO: 227 K/UL (ref 150–450)
PMV BLD AUTO: 11.2 FL (ref 9.2–12.9)
POTASSIUM SERPL-SCNC: 3.9 MMOL/L (ref 3.5–5.1)
PROT SERPL-MCNC: 8.2 G/DL (ref 6–8.4)
RBC # BLD AUTO: 4.74 M/UL (ref 4–5.4)
SATURATED IRON: 27 % (ref 20–50)
SODIUM SERPL-SCNC: 140 MMOL/L (ref 136–145)
T4 FREE SERPL-MCNC: 1.08 NG/DL (ref 0.71–1.51)
TOTAL IRON BINDING CAPACITY: 469 UG/DL (ref 250–450)
TRANSFERRIN SERPL-MCNC: 317 MG/DL (ref 200–375)
TRIGL SERPL-MCNC: 102 MG/DL (ref 30–150)
TSH SERPL DL<=0.005 MIU/L-ACNC: 1.98 UIU/ML (ref 0.4–4)
WBC # BLD AUTO: 6.37 K/UL (ref 3.9–12.7)

## 2023-10-12 PROCEDURE — 85025 COMPLETE CBC W/AUTO DIFF WBC: CPT | Mod: HCNC | Performed by: INTERNAL MEDICINE

## 2023-10-12 PROCEDURE — 84443 ASSAY THYROID STIM HORMONE: CPT | Mod: HCNC | Performed by: INTERNAL MEDICINE

## 2023-10-12 PROCEDURE — 36415 COLL VENOUS BLD VENIPUNCTURE: CPT | Mod: HCNC,PO | Performed by: INTERNAL MEDICINE

## 2023-10-12 PROCEDURE — 82306 VITAMIN D 25 HYDROXY: CPT | Mod: HCNC | Performed by: INTERNAL MEDICINE

## 2023-10-12 PROCEDURE — 80061 LIPID PANEL: CPT | Mod: HCNC | Performed by: INTERNAL MEDICINE

## 2023-10-12 PROCEDURE — 83540 ASSAY OF IRON: CPT | Mod: HCNC | Performed by: INTERNAL MEDICINE

## 2023-10-12 PROCEDURE — 80053 COMPREHEN METABOLIC PANEL: CPT | Mod: HCNC | Performed by: INTERNAL MEDICINE

## 2023-10-12 PROCEDURE — 84439 ASSAY OF FREE THYROXINE: CPT | Mod: HCNC | Performed by: INTERNAL MEDICINE

## 2023-10-12 PROCEDURE — 84466 ASSAY OF TRANSFERRIN: CPT | Mod: HCNC | Performed by: INTERNAL MEDICINE

## 2023-10-12 PROCEDURE — 83036 HEMOGLOBIN GLYCOSYLATED A1C: CPT | Mod: HCNC | Performed by: INTERNAL MEDICINE

## 2023-10-25 NOTE — PROGRESS NOTES
CC: Annual PE    83 y.o. female presents for PE  Non smoker  Social ETOH    HEALTH MAINTENANCE:  Cholesterol/labs: reviewed  C-scope: 10/2020, neg- no f/up rec  Recall, 2015  One 3 mm polyp in the ascending colon. Resected   and retrieved. Tubular adenoma  - Melanosis in the colon.  - The examination was otherwise normal on direct   and retroflexion views.  EGD: 9/2015- occ dysphagia  Impression: - LA Grade B reflux esophagitis.  - Small hiatus hernia.  - Non-bleeding erosive gastropathy. Biopsied.  - Normal examined duodenum.  WWE: aged out, asx  Mammo: pending  DEXA: 2022-   *Elevated BMD of the lumbar spine.  FRAX calculations suggest increased risk of hip fracture over the next 10 years.  Elevated BMD may be associated with elevated BMI.  *Compared with previous DXA, BMD at the lumbar spine has increased by 7.5%, and the BMD at the total hip has remained stable  AAA evaluation: 11/2018- Negative  EYE exam: UTD  DDS exam: UTD    VACCINATIONS:  TD: 2/2009, 2019  Flu: yearly  Pneumovax: 8/2014,  Prevnar: 10/2015, 7/2016  Zostavax: 3/21/2011  Shingrix; completed 11/2018  Covid: x 3    MEDCARD: Reviewed  ROS:  No fever, chills, or night sweats  No visual disturbance or d/c  No ear or sinus pain or pressure  No dysphagia or early satiety  No chest pain   +heart palpitations w/o angina or SALAS  Denied dehydration, caffeine, decongestants  No cough or wheezing  No PND or orthopnea  No GERD or abdominal pain  No change in bowels or blood in stool  No hematuria or dysuria;   No vaginal bleeding or pelvic pain or bloating  No skin rashes or lesions  No joint swelling or erythema  No unusual HA or focal deficits  No cold or heat intolerance  No increased thirst or urination  No unusual bruising or bleeding  ADL's: 100% independent  Memory: Good, delayed recall  Mental health: stress w/spouse w/ new dx prostate cancer   and NM pet scan pending today  Advance Directives:+ will, + living will, and M/POA   Nutrition: Good  Gait:  No falls, ++ chronic back pain, exercises daily in gym w/ assistant that helps w/ appropriate exercises  UC eval w/ xray + DJD and scoliosis  Tx: tylenol arthritis  -doesn't want PT at this time, feels the gym program is helpful  -saw PM in the past  -reviewed L-spine 2022, would consider MRI as the back sx have progressed  Safety: Intact  Urinary incontinence: n/a  Remainder of review negative except as previously noted    PMHX:Reviewed  PSHX: Reviewed  SHX: Reviewed      PE:  VS: As noted  GENERAL: Well developed well nourished, alert and oriented in NAD  Conversant and co-operative  EYES: Conjunctiva and lids normal, sclera anicteric  NECK: Supple w/o thyromegaly or lymphadenopathy  RESPIRATORY: Efforts are unlabored; lungs are CTAP  CARDIOVASCULAR: Heart RRR w/o mumur, gallop or rub; No carotid bruits noted  1+ pedal pulses; no LE edema noted  GASTROINTESTINAL: Bowel sounds are present, soft, nontender, nondistended  No hepatosplenomegaly noted.  MUSCULOSKELETAL: Gait normal. No clubbing, cyanosis, or edema noted.  Spine:NT, tender over the right SI region  Neg SLR  NEUROLOGIC: BALES. No tremor noted  SKIN: Warm and dry    IMPRESSION:  Annual PE  Heart palpitations, w/o angina or SOB  -EKG  -log sx if recurs  HTN, elevated  -check BP at home, call in 1-2 weeks w/ updated readings  -continue chlorthalidone 25mg qd  -continue low salt diet  CKD , III, asx  -continue hydration, water is best  -caution NSAIDS  Hypercholesterolemia,stable  -continue rosuvastatin 10mg qd  -continue low fat diet   Aortic atherosclerosis, asx  Ascending aorta- ULN 3.7cm-  (AAA scan  11/2018- WNL)  GERD, stable  -continue famotidine 40mg q hs  Insomnia, chronic  Pre-DM  IFBS,128 recent lab w/ A1C 5.7  Inflammatory spondylopathy L-spine  -Rx gabapentin 100mg hs  -continue exercises  -consider MRI  Abn urine cytology/Hematuria, hx renal stones  -Urine culture today  HM  -Mammo  -reviewed vaccine recs  -Call prn  -Pt to call in 2 weeks w/ BP  readings and update on back  -RTC 6 mos    Wt Readings from Last 5 Encounters:   11/01/23 67.3 kg (148 lb 5.9 oz)   08/10/23 72.1 kg (159 lb)   03/27/23 72.3 kg (159 lb 6.3 oz)   03/07/23 70.3 kg (154 lb 15.7 oz)   03/01/23 70.3 kg (154 lb 15.7 oz)   ]

## 2023-11-01 ENCOUNTER — OFFICE VISIT (OUTPATIENT)
Dept: INTERNAL MEDICINE | Facility: CLINIC | Age: 83
End: 2023-11-01
Payer: MEDICARE

## 2023-11-01 VITALS
BODY MASS INDEX: 26.29 KG/M2 | HEIGHT: 63 IN | SYSTOLIC BLOOD PRESSURE: 144 MMHG | HEART RATE: 88 BPM | TEMPERATURE: 96 F | OXYGEN SATURATION: 95 % | RESPIRATION RATE: 16 BRPM | WEIGHT: 148.38 LBS | DIASTOLIC BLOOD PRESSURE: 72 MMHG

## 2023-11-01 DIAGNOSIS — Z12.31 ENCOUNTER FOR SCREENING MAMMOGRAM FOR BREAST CANCER: ICD-10-CM

## 2023-11-01 DIAGNOSIS — D64.9 ANEMIA, UNSPECIFIED TYPE: ICD-10-CM

## 2023-11-01 DIAGNOSIS — R82.89 ABNORMAL URINE CYTOLOGY: ICD-10-CM

## 2023-11-01 DIAGNOSIS — I77.89 ASCENDING AORTA ENLARGEMENT: ICD-10-CM

## 2023-11-01 DIAGNOSIS — I10 ESSENTIAL HYPERTENSION: ICD-10-CM

## 2023-11-01 DIAGNOSIS — M46.96 INFLAMMATORY SPONDYLOPATHY OF LUMBAR REGION: ICD-10-CM

## 2023-11-01 DIAGNOSIS — E78.2 MIXED HYPERLIPIDEMIA: ICD-10-CM

## 2023-11-01 DIAGNOSIS — F51.01 PRIMARY INSOMNIA: ICD-10-CM

## 2023-11-01 DIAGNOSIS — Z00.00 ANNUAL PHYSICAL EXAM: Primary | ICD-10-CM

## 2023-11-01 DIAGNOSIS — R73.01 IMPAIRED FASTING GLUCOSE: ICD-10-CM

## 2023-11-01 DIAGNOSIS — R00.2 HEART PALPITATIONS: ICD-10-CM

## 2023-11-01 DIAGNOSIS — I70.0 AORTIC ATHEROSCLEROSIS: ICD-10-CM

## 2023-11-01 DIAGNOSIS — N18.30 STAGE 3 CHRONIC KIDNEY DISEASE, UNSPECIFIED WHETHER STAGE 3A OR 3B CKD: ICD-10-CM

## 2023-11-01 DIAGNOSIS — K21.9 GASTROESOPHAGEAL REFLUX DISEASE WITHOUT ESOPHAGITIS: ICD-10-CM

## 2023-11-01 PROCEDURE — 99999 PR PBB SHADOW E&M-EST. PATIENT-LVL IV: CPT | Mod: PBBFAC,HCNC,, | Performed by: INTERNAL MEDICINE

## 2023-11-01 PROCEDURE — 1159F PR MEDICATION LIST DOCUMENTED IN MEDICAL RECORD: ICD-10-PCS | Mod: HCNC,CPTII,S$GLB, | Performed by: INTERNAL MEDICINE

## 2023-11-01 PROCEDURE — 1160F PR REVIEW ALL MEDS BY PRESCRIBER/CLIN PHARMACIST DOCUMENTED: ICD-10-PCS | Mod: HCNC,CPTII,S$GLB, | Performed by: INTERNAL MEDICINE

## 2023-11-01 PROCEDURE — 1101F PR PT FALLS ASSESS DOC 0-1 FALLS W/OUT INJ PAST YR: ICD-10-PCS | Mod: HCNC,CPTII,S$GLB, | Performed by: INTERNAL MEDICINE

## 2023-11-01 PROCEDURE — 3077F PR MOST RECENT SYSTOLIC BLOOD PRESSURE >= 140 MM HG: ICD-10-PCS | Mod: HCNC,CPTII,S$GLB, | Performed by: INTERNAL MEDICINE

## 2023-11-01 PROCEDURE — 93010 ELECTROCARDIOGRAM REPORT: CPT | Mod: HCNC,S$GLB,, | Performed by: INTERNAL MEDICINE

## 2023-11-01 PROCEDURE — 3077F SYST BP >= 140 MM HG: CPT | Mod: HCNC,CPTII,S$GLB, | Performed by: INTERNAL MEDICINE

## 2023-11-01 PROCEDURE — 99397 PR PREVENTIVE VISIT,EST,65 & OVER: ICD-10-PCS | Mod: HCNC,S$GLB,, | Performed by: INTERNAL MEDICINE

## 2023-11-01 PROCEDURE — 1101F PT FALLS ASSESS-DOCD LE1/YR: CPT | Mod: HCNC,CPTII,S$GLB, | Performed by: INTERNAL MEDICINE

## 2023-11-01 PROCEDURE — 1160F RVW MEDS BY RX/DR IN RCRD: CPT | Mod: HCNC,CPTII,S$GLB, | Performed by: INTERNAL MEDICINE

## 2023-11-01 PROCEDURE — 1125F PR PAIN SEVERITY QUANTIFIED, PAIN PRESENT: ICD-10-PCS | Mod: HCNC,CPTII,S$GLB, | Performed by: INTERNAL MEDICINE

## 2023-11-01 PROCEDURE — 3288F PR FALLS RISK ASSESSMENT DOCUMENTED: ICD-10-PCS | Mod: HCNC,CPTII,S$GLB, | Performed by: INTERNAL MEDICINE

## 2023-11-01 PROCEDURE — 93005 EKG 12-LEAD: ICD-10-PCS | Mod: HCNC,S$GLB,, | Performed by: INTERNAL MEDICINE

## 2023-11-01 PROCEDURE — 93010 EKG 12-LEAD: ICD-10-PCS | Mod: HCNC,S$GLB,, | Performed by: INTERNAL MEDICINE

## 2023-11-01 PROCEDURE — 3288F FALL RISK ASSESSMENT DOCD: CPT | Mod: HCNC,CPTII,S$GLB, | Performed by: INTERNAL MEDICINE

## 2023-11-01 PROCEDURE — 1125F AMNT PAIN NOTED PAIN PRSNT: CPT | Mod: HCNC,CPTII,S$GLB, | Performed by: INTERNAL MEDICINE

## 2023-11-01 PROCEDURE — 93005 ELECTROCARDIOGRAM TRACING: CPT | Mod: HCNC,S$GLB,, | Performed by: INTERNAL MEDICINE

## 2023-11-01 PROCEDURE — 99999 PR PBB SHADOW E&M-EST. PATIENT-LVL IV: ICD-10-PCS | Mod: PBBFAC,HCNC,, | Performed by: INTERNAL MEDICINE

## 2023-11-01 PROCEDURE — 3078F PR MOST RECENT DIASTOLIC BLOOD PRESSURE < 80 MM HG: ICD-10-PCS | Mod: HCNC,CPTII,S$GLB, | Performed by: INTERNAL MEDICINE

## 2023-11-01 PROCEDURE — 3078F DIAST BP <80 MM HG: CPT | Mod: HCNC,CPTII,S$GLB, | Performed by: INTERNAL MEDICINE

## 2023-11-01 PROCEDURE — 87086 URINE CULTURE/COLONY COUNT: CPT | Mod: HCNC | Performed by: INTERNAL MEDICINE

## 2023-11-01 PROCEDURE — 99397 PER PM REEVAL EST PAT 65+ YR: CPT | Mod: HCNC,S$GLB,, | Performed by: INTERNAL MEDICINE

## 2023-11-01 PROCEDURE — 1159F MED LIST DOCD IN RCRD: CPT | Mod: HCNC,CPTII,S$GLB, | Performed by: INTERNAL MEDICINE

## 2023-11-01 RX ORDER — ROSUVASTATIN CALCIUM 10 MG/1
10 TABLET, COATED ORAL DAILY
Qty: 90 TABLET | Refills: 3 | Status: SHIPPED | OUTPATIENT
Start: 2023-11-01

## 2023-11-01 RX ORDER — CYCLOBENZAPRINE HCL 5 MG
5 TABLET ORAL NIGHTLY PRN
COMMUNITY
Start: 2023-09-18

## 2023-11-01 RX ORDER — DEXTROMETHORPHAN HYDROBROMIDE, GUAIFENESIN 5; 100 MG/5ML; MG/5ML
650 LIQUID ORAL EVERY 8 HOURS
COMMUNITY

## 2023-11-01 RX ORDER — GABAPENTIN 100 MG/1
100 CAPSULE ORAL NIGHTLY
Qty: 90 CAPSULE | Refills: 1 | Status: SHIPPED | OUTPATIENT
Start: 2023-11-01 | End: 2024-10-31

## 2023-11-02 LAB — BACTERIA UR CULT: NO GROWTH

## 2023-11-03 ENCOUNTER — TELEPHONE (OUTPATIENT)
Dept: INTERNAL MEDICINE | Facility: CLINIC | Age: 83
End: 2023-11-03
Payer: MEDICARE

## 2023-11-03 NOTE — TELEPHONE ENCOUNTER
----- Message from Rosemarie Brooks MD sent at 11/3/2023  1:22 PM CDT -----  Please note that your urine culture did not reveal an infection at this time.  Please do not hesitate to call/email if you have any questions or concerns  And advise if your symptoms persist or exacerbate as they may need further evaluation.        chloe

## 2023-11-03 NOTE — TELEPHONE ENCOUNTER
----- Message from Rosemarie Brooks MD sent at 11/2/2023  4:38 PM CDT -----  Your EKG has resulted, the cardiologist had read it and found you to be in normal sinus rhythm.  Please remember that if you start to have palpitations the best way to diagnose them is to try to capture them with an EKG or an Apple watch if 1 is available during the episode.  A visit with the cardiologist would also be recommended if the palpitations persist and especially if they are accompanied by shortness of breath or weakness or chest pain.  Please advise if you note any changes.  chloe

## 2023-12-14 ENCOUNTER — HOSPITAL ENCOUNTER (OUTPATIENT)
Dept: RADIOLOGY | Facility: CLINIC | Age: 83
Discharge: HOME | End: 2023-12-14
Attending: INTERNAL MEDICINE
Payer: MEDICARE

## 2023-12-14 DIAGNOSIS — Z12.31 ENCOUNTER FOR SCREENING MAMMOGRAM FOR BREAST CANCER: ICD-10-CM

## 2023-12-14 PROCEDURE — 77067 SCR MAMMO BI INCL CAD: CPT | Mod: TC,HCNC,PO

## 2023-12-14 PROCEDURE — 77067 MAMMO DIGITAL SCREENING BILAT WITH TOMO: ICD-10-PCS | Mod: 26,HCNC,, | Performed by: RADIOLOGY

## 2023-12-14 PROCEDURE — 77067 SCR MAMMO BI INCL CAD: CPT | Mod: 26,HCNC,, | Performed by: RADIOLOGY

## 2023-12-14 PROCEDURE — 77063 BREAST TOMOSYNTHESIS BI: CPT | Mod: 26,HCNC,, | Performed by: RADIOLOGY

## 2023-12-14 PROCEDURE — 77063 MAMMO DIGITAL SCREENING BILAT WITH TOMO: ICD-10-PCS | Mod: 26,HCNC,, | Performed by: RADIOLOGY

## 2023-12-15 ENCOUNTER — TELEPHONE (OUTPATIENT)
Dept: INTERNAL MEDICINE | Facility: CLINIC | Age: 83
End: 2023-12-15
Payer: MEDICARE

## 2023-12-15 NOTE — TELEPHONE ENCOUNTER
----- Message from Rosemarie Brooks MD sent at 12/14/2023  4:35 PM CST -----  Please note that your mammogram was read as follows  Impression:  There is no mammographic evidence of malignancy.   chloe

## 2024-03-14 ENCOUNTER — TELEPHONE (OUTPATIENT)
Dept: OPHTHALMOLOGY | Facility: CLINIC | Age: 84
End: 2024-03-14
Payer: MEDICARE

## 2024-03-14 NOTE — TELEPHONE ENCOUNTER
Prasannam for pt to call back to schedule     ----- Message from Nena Elizalde sent at 3/14/2024  1:12 PM CDT -----  Contact: Self  Type:  Sooner Appointment Request    Caller is requesting a sooner appointment.  Caller declined first available appointment listed below.  Caller will not accept being placed on the waitlist and is requesting a message be sent to doctor.    Name of Caller:  Self  When is the first available appointment?  na  Symptoms:  needs to schedule as a new patient was sent form her reg eye Dr  Would the patient rather a call back or a response via [facility]? call  Best Call Back Number:  [phone]   Please call back to schedule/advise. Thanks!

## 2024-03-14 NOTE — TELEPHONE ENCOUNTER
Spoke to pt and scheduled next avail cat ana OU     ----- Message from Emeli Escobar sent at 3/14/2024  3:58 PM CDT -----  Contact: self  Type:  Patient Returning Call    Who Called:  pt  Who Left Message for Patient:  mini  Does the patient know what this is regarding?:  yes  Best Call Back Number:  [phone]   Additional Information:  please call

## 2024-05-01 ENCOUNTER — TELEPHONE (OUTPATIENT)
Dept: INTERNAL MEDICINE | Facility: CLINIC | Age: 84
End: 2024-05-01
Payer: MEDICARE

## 2024-05-01 DIAGNOSIS — N18.30 STAGE 3 CHRONIC KIDNEY DISEASE, UNSPECIFIED WHETHER STAGE 3A OR 3B CKD: ICD-10-CM

## 2024-05-01 DIAGNOSIS — I10 ESSENTIAL HYPERTENSION: Primary | ICD-10-CM

## 2024-05-01 DIAGNOSIS — R79.9 ABNORMAL FINDING OF BLOOD CHEMISTRY, UNSPECIFIED: ICD-10-CM

## 2024-05-01 DIAGNOSIS — Z78.0 MENOPAUSE: ICD-10-CM

## 2024-05-01 NOTE — TELEPHONE ENCOUNTER
----- Message from Katty Estes MA sent at 5/1/2024 11:14 AM CDT -----  Contact: [phone]    ----- Message -----  From: Katrin Pimentel  Sent: 5/1/2024  11:13 AM CDT  To: Cecilia Souza Staff    1MEDICALADVICE     Patient is calling for Medical Advice regarding:speak to the nurse     How long has patient had these symptoms:    Pharmacy name and phone#:    Would like response via [facility]:  no     Comments:  Pt is calling she states she is needing to speak to you in regards to an upcoming appt

## 2024-05-01 NOTE — TELEPHONE ENCOUNTER
Orders entered and linked.    We booked her w/ resident and dr gonzalez in June.     having heart procedure on 5/13 in [employer].

## 2024-06-04 ENCOUNTER — LAB VISIT (OUTPATIENT)
Dept: LAB | Facility: HOSPITAL | Age: 84
End: 2024-06-04
Attending: INTERNAL MEDICINE
Payer: MEDICARE

## 2024-06-04 DIAGNOSIS — N18.30 STAGE 3 CHRONIC KIDNEY DISEASE, UNSPECIFIED WHETHER STAGE 3A OR 3B CKD: ICD-10-CM

## 2024-06-04 DIAGNOSIS — I10 ESSENTIAL HYPERTENSION: ICD-10-CM

## 2024-06-04 DIAGNOSIS — R79.9 ABNORMAL FINDING OF BLOOD CHEMISTRY, UNSPECIFIED: ICD-10-CM

## 2024-06-04 LAB
ALBUMIN SERPL BCP-MCNC: 4.1 G/DL (ref 3.5–5.2)
ALP SERPL-CCNC: 50 U/L (ref 55–135)
ALT SERPL W/O P-5'-P-CCNC: 19 U/L (ref 10–44)
ANION GAP SERPL CALC-SCNC: 9 MMOL/L (ref 8–16)
AST SERPL-CCNC: 17 U/L (ref 10–40)
BASOPHILS # BLD AUTO: 0.05 K/UL (ref 0–0.2)
BASOPHILS NFR BLD: 0.9 % (ref 0–1.9)
BILIRUB SERPL-MCNC: 1.1 MG/DL (ref 0.1–1)
BUN SERPL-MCNC: 24 MG/DL (ref 8–23)
CALCIUM SERPL-MCNC: 10.3 MG/DL (ref 8.7–10.5)
CHLORIDE SERPL-SCNC: 102 MMOL/L (ref 95–110)
CO2 SERPL-SCNC: 28 MMOL/L (ref 23–29)
CREAT SERPL-MCNC: 1 MG/DL (ref 0.5–1.4)
DIFFERENTIAL METHOD BLD: ABNORMAL
EOSINOPHIL # BLD AUTO: 0.1 K/UL (ref 0–0.5)
EOSINOPHIL NFR BLD: 1.5 % (ref 0–8)
ERYTHROCYTE [DISTWIDTH] IN BLOOD BY AUTOMATED COUNT: 12.8 % (ref 11.5–14.5)
EST. GFR  (NO RACE VARIABLE): 56.6 ML/MIN/1.73 M^2
ESTIMATED AVG GLUCOSE: 111 MG/DL (ref 68–131)
GLUCOSE SERPL-MCNC: 108 MG/DL (ref 70–110)
HBA1C MFR BLD: 5.5 % (ref 4–5.6)
HCT VFR BLD AUTO: 42.1 % (ref 37–48.5)
HGB BLD-MCNC: 14.2 G/DL (ref 12–16)
IMM GRANULOCYTES # BLD AUTO: 0.03 K/UL (ref 0–0.04)
IMM GRANULOCYTES NFR BLD AUTO: 0.5 % (ref 0–0.5)
LYMPHOCYTES # BLD AUTO: 1.3 K/UL (ref 1–4.8)
LYMPHOCYTES NFR BLD: 21.6 % (ref 18–48)
MCH RBC QN AUTO: 32.3 PG (ref 27–31)
MCHC RBC AUTO-ENTMCNC: 33.7 G/DL (ref 32–36)
MCV RBC AUTO: 96 FL (ref 82–98)
MONOCYTES # BLD AUTO: 0.6 K/UL (ref 0.3–1)
MONOCYTES NFR BLD: 9.5 % (ref 4–15)
NEUTROPHILS # BLD AUTO: 3.9 K/UL (ref 1.8–7.7)
NEUTROPHILS NFR BLD: 66 % (ref 38–73)
NRBC BLD-RTO: 0 /100 WBC
PLATELET # BLD AUTO: 210 K/UL (ref 150–450)
PMV BLD AUTO: 11.2 FL (ref 9.2–12.9)
POTASSIUM SERPL-SCNC: 3.6 MMOL/L (ref 3.5–5.1)
PROT SERPL-MCNC: 7.1 G/DL (ref 6–8.4)
RBC # BLD AUTO: 4.39 M/UL (ref 4–5.4)
SODIUM SERPL-SCNC: 139 MMOL/L (ref 136–145)
WBC # BLD AUTO: 5.87 K/UL (ref 3.9–12.7)

## 2024-06-04 PROCEDURE — 83036 HEMOGLOBIN GLYCOSYLATED A1C: CPT | Mod: HCNC | Performed by: INTERNAL MEDICINE

## 2024-06-04 PROCEDURE — 80053 COMPREHEN METABOLIC PANEL: CPT | Mod: HCNC | Performed by: INTERNAL MEDICINE

## 2024-06-04 PROCEDURE — 85025 COMPLETE CBC W/AUTO DIFF WBC: CPT | Mod: HCNC | Performed by: INTERNAL MEDICINE

## 2024-06-04 PROCEDURE — 36415 COLL VENOUS BLD VENIPUNCTURE: CPT | Mod: HCNC,PO | Performed by: INTERNAL MEDICINE

## 2024-06-11 ENCOUNTER — APPOINTMENT (OUTPATIENT)
Dept: RADIOLOGY | Facility: CLINIC | Age: 84
End: 2024-06-11
Attending: INTERNAL MEDICINE
Payer: MEDICARE

## 2024-06-11 ENCOUNTER — OFFICE VISIT (OUTPATIENT)
Dept: INTERNAL MEDICINE | Facility: CLINIC | Age: 84
End: 2024-06-11
Payer: MEDICARE

## 2024-06-11 VITALS
HEART RATE: 75 BPM | OXYGEN SATURATION: 98 % | DIASTOLIC BLOOD PRESSURE: 76 MMHG | RESPIRATION RATE: 16 BRPM | BODY MASS INDEX: 27.42 KG/M2 | SYSTOLIC BLOOD PRESSURE: 126 MMHG | HEIGHT: 63 IN | TEMPERATURE: 98 F | WEIGHT: 154.75 LBS

## 2024-06-11 DIAGNOSIS — K21.9 GASTROESOPHAGEAL REFLUX DISEASE WITHOUT ESOPHAGITIS: ICD-10-CM

## 2024-06-11 DIAGNOSIS — Z78.0 MENOPAUSE: ICD-10-CM

## 2024-06-11 DIAGNOSIS — N18.30 STAGE 3 CHRONIC KIDNEY DISEASE, UNSPECIFIED WHETHER STAGE 3A OR 3B CKD: ICD-10-CM

## 2024-06-11 DIAGNOSIS — I10 ESSENTIAL HYPERTENSION: Primary | ICD-10-CM

## 2024-06-11 DIAGNOSIS — E78.2 MIXED HYPERLIPIDEMIA: ICD-10-CM

## 2024-06-11 DIAGNOSIS — M46.96 INFLAMMATORY SPONDYLOPATHY OF LUMBAR REGION: ICD-10-CM

## 2024-06-11 DIAGNOSIS — I70.0 AORTIC ATHEROSCLEROSIS: ICD-10-CM

## 2024-06-11 DIAGNOSIS — R73.03 PRE-DIABETES: ICD-10-CM

## 2024-06-11 PROCEDURE — 77080 DXA BONE DENSITY AXIAL: CPT | Mod: TC,HCNC,PO

## 2024-06-11 PROCEDURE — 99999 PR PBB SHADOW E&M-EST. PATIENT-LVL III: CPT | Mod: PBBFAC,HCNC,GC,

## 2024-06-11 PROCEDURE — 77080 DXA BONE DENSITY AXIAL: CPT | Mod: 26,HCNC,, | Performed by: INTERNAL MEDICINE

## 2024-06-11 NOTE — PROGRESS NOTES
Clinic Note  6/11/2024      Subjective:       Patient ID:  patient is a 83 y.o. female being seen for a follow up visit.     Chief Complaint: 6m f/u    HPI  Ms. Hopper is an 83 year old female w/ HTN, CKD3, hypercholesterolemia, AA, GERD, insomnia, Pre-DM, IFBS, inflammatory spondylopathy of the lumbar spine who presents for a follow up of her chronic medical conditions. Denies acute complaints including HA, vision changes, CP, SOB, fevers, chills, abdominal pain, urinary sxs or stool changes.     HTN: Takes chlorthalidone 25mg QD. Adheres to a low Na diet. Normotensive today in clinic. Doesn't take BP regularly at home.     CKD3: Cr and GFR stable on most recent CMP. Electrolytes stable. Good PO hydration.     HLD: Takes rosuvastatin 10mg QD. Adheres to low fat diet. Lipid panel from 10/2023 stable.    Aortic atherosclerosis: Stable; remains asymptomatic.     GERD: Symptoms stable. Takes famotidine as needed. Denies red flag symptoms.     PreDM: Repeat A1C stable at 5.5. Adheres to low carb, lean and green diet.     Inflammatory spondylopathy of the lumbar spine: Consistent w/ exercise and weight training. Takes tylenol and advil PRN (seldomly used).    Vaccines: Up to date    Cancer screening:  -Colonoscopy: Last completed in 2020. Next due 2025  -Mammogram: Aged out  -DEXA: Due; ordered but not scheduled.     Review of Systems   Constitutional:  Negative for chills and fever.   HENT:  Negative for congestion and sinus pain.    Eyes:  Negative for blurred vision and pain.   Respiratory:  Negative for cough and shortness of breath.    Cardiovascular:  Negative for chest pain and palpitations.   Gastrointestinal:  Negative for abdominal pain, diarrhea and vomiting.   Musculoskeletal:  Negative for joint pain and myalgias.   Skin:  Negative for itching and rash.   Neurological:  Negative for dizziness and headaches.   Psychiatric/Behavioral:  Negative for depression and suicidal ideas.        Medication List with  "Changes/Refills   Current Medications    ACETAMINOPHEN (TYLENOL) 650 MG TBSR    Take 650 mg by mouth every 8 (eight) hours.    CHLORTHALIDONE (HYGROTEN) 25 MG TAB    TAKE 1 TABLET  BY MOUTH ONCE DAILY.    CYCLOBENZAPRINE (FLEXERIL) 5 MG TABLET    Take 5 mg by mouth nightly as needed.    GABAPENTIN (NEURONTIN) 100 MG CAPSULE    Take 1 capsule (100 mg total) by mouth every evening.    ROSUVASTATIN (CRESTOR) 10 MG TABLET    Take 1 tablet (10 mg total) by mouth once daily.    VITAMIN D 1000 UNITS TAB    Take 1,000 Units by mouth once daily.       Patient Active Problem List   Diagnosis    Nuclear sclerosis - Both Eyes    Gastroesophageal reflux disease without esophagitis    Insomnia    HLD (hyperlipidemia)    Atherosclerosis of aorta    Essential hypertension    Lumbar spondylosis    Myofascial pain syndrome    Spinal stenosis of lumbosacral region    SI (sacroiliac) joint dysfunction    Lumbar facet arthropathy    Arthropathy of cervical facet joint    DDD (degenerative disc disease), lumbosacral    Weakness    Chronic pain    Ascending aorta enlargement    Inflammatory spondylopathy of lumbar region    History of colon polyps    Right-sided low back pain without sciatica    Decreased strength of lower extremity    Overweight (BMI 25.0-29.9)    Risk for falls             Health Maintenance Topics with due status: Not Due       Topic Last Completion Date    TETANUS VACCINE 05/22/2019    Colonoscopy 10/01/2020    Lipid Panel 10/12/2023    Hemoglobin A1c (Prediabetes) 06/04/2024       Objective:      /76 (BP Location: Left arm, Patient Position: Sitting, BP Method: Medium (Manual))   Pulse 75   Temp 97.5 °F (36.4 °C) (Temporal)   Resp 16   Ht 5' 3" (1.6 m)   Wt 70.2 kg (154 lb 12.2 oz)   SpO2 98%   BMI 27.42 kg/m²   Estimated body mass index is 27.42 kg/m² as calculated from the following:    Height as of this encounter: 5' 3" (1.6 m).    Weight as of this encounter: 70.2 kg (154 lb 12.2 oz).  Physical " Exam  Constitutional:       General: She is not in acute distress.     Appearance: Normal appearance. She is not ill-appearing.   HENT:      Head: Normocephalic and atraumatic.      Right Ear: External ear normal.      Left Ear: External ear normal.      Mouth/Throat:      Mouth: Mucous membranes are moist.      Pharynx: Oropharynx is clear.   Cardiovascular:      Rate and Rhythm: Normal rate and regular rhythm.      Heart sounds: Normal heart sounds. No murmur heard.  Pulmonary:      Effort: No respiratory distress.      Breath sounds: Normal breath sounds.   Abdominal:      General: Abdomen is flat. There is no distension.   Musculoskeletal:         General: No swelling or tenderness. Normal range of motion.      Right lower leg: Edema present.      Left lower leg: Edema present.   Neurological:      General: No focal deficit present.      Mental Status: She is alert and oriented to person, place, and time.           Assessment and Plan:     Essential hypertension  - Stable; continue chlorthalidone 25mg QD. Adhere to low Na diet.     Stage 3 chronic kidney disease  - Stable; continue good PO hydration. Avoid excessive use of NSAIDs    Mixed hyperlipidemia  - Stable; continue rosuvastatin 10mg QD. Adhere to low fat diet.     Aortic atherosclerosis  - Stable. Continue risk factor reduction.     Gastroesophageal reflux disease without esophagitis  - Stable; continue famotidine.    Pre-diabetes  - Improved. A1C 5.5. Continue lifestyle modifications.     Inflammatory spondylopathy of lumbar region  - Stable. Continue aerobic exercises and weight training along w/ regular stretching.       Follow Up:   Follow up in about 5 months (around 11/11/2024), or if symptoms worsen or fail to improve, for Annual exam.        Raisa Gleason M.D.  Internal Medicine PGY-3  [facility]

## 2024-06-17 ENCOUNTER — PATIENT MESSAGE (OUTPATIENT)
Dept: INTERNAL MEDICINE | Facility: CLINIC | Age: 84
End: 2024-06-17
Payer: MEDICARE

## 2024-06-17 DIAGNOSIS — R93.7 ABNORMAL X-RAY OF LUMBAR SPINE: Primary | ICD-10-CM

## 2024-06-17 NOTE — TELEPHONE ENCOUNTER
Portal message sent to patient concerning DEXA results and recommendation for L-spine x-rays.  An X-ray order has been placed please facilitate her appointment  
Xray order sent to our appointment coordinators for scheduling.  
04-Mar-2019

## 2024-06-18 ENCOUNTER — TELEPHONE (OUTPATIENT)
Dept: INTERNAL MEDICINE | Facility: CLINIC | Age: 84
End: 2024-06-18
Payer: MEDICARE

## 2024-06-18 DIAGNOSIS — M25.562 LEFT KNEE PAIN, UNSPECIFIED CHRONICITY: Primary | ICD-10-CM

## 2024-06-18 NOTE — TELEPHONE ENCOUNTER
----- Message from Rosemarie Brooks MD sent at 6/17/2024  5:11 PM CDT -----  Your bone density has resulted, and while it is overall normal there is a variation between lumbar 1 and lumbar 2 with a recommendation to x-ray the spine to make sure that there is no compression fracture.   An order for an x-ray will be placed and the  will call you to set up an appointment.  Please do not hesitate to call/email if you have any questions or concerns.  chloe

## 2024-06-18 NOTE — TELEPHONE ENCOUNTER
----- Message from Ema May sent at 6/18/2024 10:37 AM CDT -----  Contact: Self [phone]  Would like to receive medical advice.    Would they like a call back or a response via [facility]:  call back    Additional information:  Pt is requesting an order placed for an xray of left knee. Pt would like to schedule for 6/24 with upcoming appt in [place].

## 2024-06-18 NOTE — TELEPHONE ENCOUNTER
The patient is requesting an additional xray order. She is requesting to have an xray of the left knee.   She states she is having pain and trouble with that knee.  The patient is already scheduled for an xray of the spine.    Please advise.

## 2024-06-24 ENCOUNTER — OFFICE VISIT (OUTPATIENT)
Dept: URGENT CARE | Facility: CLINIC | Age: 84
End: 2024-06-24
Payer: MEDICARE

## 2024-06-24 ENCOUNTER — HOSPITAL ENCOUNTER (OUTPATIENT)
Dept: RADIOLOGY | Facility: CLINIC | Age: 84
Discharge: HOME | End: 2024-06-24
Attending: INTERNAL MEDICINE
Payer: MEDICARE

## 2024-06-24 VITALS
RESPIRATION RATE: 16 BRPM | TEMPERATURE: 98 F | DIASTOLIC BLOOD PRESSURE: 82 MMHG | HEART RATE: 87 BPM | WEIGHT: 154 LBS | HEIGHT: 63 IN | BODY MASS INDEX: 27.29 KG/M2 | SYSTOLIC BLOOD PRESSURE: 145 MMHG | OXYGEN SATURATION: 97 %

## 2024-06-24 DIAGNOSIS — S80.12XA CONTUSION OF LEFT LOWER LEG, INITIAL ENCOUNTER: ICD-10-CM

## 2024-06-24 DIAGNOSIS — R93.7 ABNORMAL X-RAY OF LUMBAR SPINE: ICD-10-CM

## 2024-06-24 DIAGNOSIS — L81.4: Primary | ICD-10-CM

## 2024-06-24 DIAGNOSIS — M25.562 LEFT KNEE PAIN, UNSPECIFIED CHRONICITY: ICD-10-CM

## 2024-06-24 PROCEDURE — 99203 OFFICE O/P NEW LOW 30 MIN: CPT | Mod: S$GLB,,, | Performed by: NURSE PRACTITIONER

## 2024-06-24 PROCEDURE — 72110 X-RAY EXAM L-2 SPINE 4/>VWS: CPT | Mod: 26,,, | Performed by: STUDENT IN AN ORGANIZED HEALTH CARE EDUCATION/TRAINING PROGRAM

## 2024-06-24 PROCEDURE — 73562 X-RAY EXAM OF KNEE 3: CPT | Mod: TC,FY,PO,LT

## 2024-06-24 PROCEDURE — 72110 X-RAY EXAM L-2 SPINE 4/>VWS: CPT | Mod: TC,FY,PO

## 2024-06-24 PROCEDURE — 73562 X-RAY EXAM OF KNEE 3: CPT | Mod: 26,LT,S$GLB, | Performed by: STUDENT IN AN ORGANIZED HEALTH CARE EDUCATION/TRAINING PROGRAM

## 2024-06-24 NOTE — PATIENT INSTRUCTIONS
Protect your skin from sun exposure  Stop using the corticosteroid cream to the bruised area of your lower leg.  While in the sun please use sunscreen and reapply often  Follow-up with dermatology referral  Return to this clinic for any future needs

## 2024-06-24 NOTE — PROGRESS NOTES
"Subjective:      Patient ID: Zack Hopper is a 83 y.o. female.    Vitals:  height is 5' 3" (1.6 m) and weight is 69.9 kg (154 lb). Her temperature is 98.1 °F (36.7 °C). Her blood pressure is 145/82 (abnormal) and her pulse is 87. Her respiration is 16 and oxygen saturation is 97%.     Chief Complaint: Rash    83-year-old female with history of seborrheic keratosis and lentigo seen today for "spots on my leg".  She reports using corticosteroid cream all 1 area of her leg and states the lesion she was using the cream on has resolved but a small bruise remains in its place.  She denies any trauma.  She denies any recent change in the size or texture of the areas.    Rash  This is a new problem. Episode onset: x 5 months. The problem is unchanged. The affected locations include the left lower leg. Pertinent negatives include no fever, shortness of breath or vomiting. Past treatments include topical steroids. The treatment provided no relief.       Constitution: Negative for chills and fever.   Cardiovascular:  Negative for chest pain, palpitations and sob on exertion.   Respiratory:  Negative for shortness of breath.    Gastrointestinal:  Negative for nausea and vomiting.   Skin:  Positive for lesion. Negative for rash.   Neurological:  Negative for disorientation and altered mental status.   Psychiatric/Behavioral:  Negative for altered mental status, disorientation and confusion.       Objective:     Physical Exam   Constitutional: She is oriented to person, place, and time. She appears well-developed. She is cooperative.  Non-toxic appearance. She does not appear ill. No distress.   HENT:   Head: Normocephalic and atraumatic.   Ears:   Right Ear: External ear normal.   Left Ear: External ear normal.   Nose: Nose normal.   Mouth/Throat: Oropharynx is clear and moist and mucous membranes are normal. Mucous membranes are moist.   Eyes: Conjunctivae and lids are normal. No scleral icterus.   Neck: Trachea normal " and phonation normal. Neck supple.   Cardiovascular: Normal rate, regular rhythm, normal heart sounds and normal pulses.   Pulmonary/Chest: Effort normal and breath sounds normal. No stridor. No respiratory distress.   Abdominal: Normal appearance.   Musculoskeletal:         General: No deformity.   Neurological: no focal deficit. She is alert and oriented to person, place, and time. She has normal strength and normal reflexes. No sensory deficit.   Skin: Skin is warm, dry, intact and not diaphoretic. Capillary refill takes 2 to 3 seconds.        Psychiatric: Her speech is normal and behavior is normal. Judgment and thought content normal.   Nursing note and vitals reviewed.      Assessment:     1. Senile lentigines    2. Contusion of left lower leg, initial encounter        Plan:       Senile lentigines  -     Ambulatory referral/consult to Dermatology    Contusion of left lower leg, initial encounter  -     Ambulatory referral/consult to Dermatology      The physical exam findings were discussed with the patient and all questions answered. We discussed the need for dermatology consult. She reports having an appt. In 2 weeks but is anxious and would like a sooner appointment. I have entered a new referral in an attempt to get her a sooner appointment. We discussed strict symptom monitoring, and follow up orders. she verbalized understanding and agreement with the plan of care.

## 2024-06-26 ENCOUNTER — TELEPHONE (OUTPATIENT)
Dept: INTERNAL MEDICINE | Facility: CLINIC | Age: 84
End: 2024-06-26
Payer: MEDICARE

## 2024-06-26 NOTE — TELEPHONE ENCOUNTER
----- Message from Rosemarie Brooks MD sent at 6/26/2024  3:35 PM CDT -----  Your back and knee x-rays have resulted and they both reveal extensive arthritis.  Please advise if you are interested in physical therapy or a referral to the orthopedist.  chloe

## 2024-07-01 ENCOUNTER — TELEPHONE (OUTPATIENT)
Dept: INTERNAL MEDICINE | Facility: CLINIC | Age: 84
End: 2024-07-01
Payer: MEDICARE

## 2024-07-01 DIAGNOSIS — M25.562 LEFT KNEE PAIN, UNSPECIFIED CHRONICITY: Primary | ICD-10-CM

## 2024-07-01 NOTE — TELEPHONE ENCOUNTER
Returned pt called and requested referral to the orthopedist.     Pt wants to know what the difference in Replacement & a scope for her knee.   just concern for the future and would like to know your opinion even tho she know orthopedic would make decision.

## 2024-07-01 NOTE — TELEPHONE ENCOUNTER
----- Message from Ciera Romero sent at 7/1/2024  2:55 PM CDT -----  Contact: Patient   [phone]  2TESTRESULTS    Type: Test Results    What test was performed? X rays    Who ordered the test? 6/24    When and where were the test performed? [facility]     Would you like a call back and or thru [facility]: CALL PLEASE     Comments:patient wants to know how to proceed   [phone]

## 2024-07-16 ENCOUNTER — OFFICE VISIT (OUTPATIENT)
Dept: OPHTHALMOLOGY | Facility: CLINIC | Age: 84
End: 2024-07-16
Payer: MEDICARE

## 2024-07-16 DIAGNOSIS — H04.123 DRY EYE SYNDROME, BILATERAL: ICD-10-CM

## 2024-07-16 DIAGNOSIS — H25.13 NUCLEAR SCLEROTIC CATARACT, BILATERAL: Primary | ICD-10-CM

## 2024-07-16 PROCEDURE — 3288F FALL RISK ASSESSMENT DOCD: CPT | Mod: CPTII,S$GLB,, | Performed by: OPHTHALMOLOGY

## 2024-07-16 PROCEDURE — 1160F RVW MEDS BY RX/DR IN RCRD: CPT | Mod: CPTII,S$GLB,, | Performed by: OPHTHALMOLOGY

## 2024-07-16 PROCEDURE — 1159F MED LIST DOCD IN RCRD: CPT | Mod: CPTII,S$GLB,, | Performed by: OPHTHALMOLOGY

## 2024-07-16 PROCEDURE — 92014 COMPRE OPH EXAM EST PT 1/>: CPT | Mod: S$GLB,,, | Performed by: OPHTHALMOLOGY

## 2024-07-16 PROCEDURE — 99999 PR PBB SHADOW E&M-EST. PATIENT-LVL II: CPT | Mod: PBBFAC,,, | Performed by: OPHTHALMOLOGY

## 2024-07-16 PROCEDURE — 1126F AMNT PAIN NOTED NONE PRSNT: CPT | Mod: CPTII,S$GLB,, | Performed by: OPHTHALMOLOGY

## 2024-07-16 PROCEDURE — 1101F PT FALLS ASSESS-DOCD LE1/YR: CPT | Mod: CPTII,S$GLB,, | Performed by: OPHTHALMOLOGY

## 2024-07-16 PROCEDURE — 92015 DETERMINE REFRACTIVE STATE: CPT | Mod: S$GLB,,, | Performed by: OPHTHALMOLOGY

## 2024-07-16 NOTE — PROGRESS NOTES
HPI    DLS: 8/2023 CE     Pt states she feels like vision has gotten wore since then. States not   much trouble with glare. But wears sun glasses during the day.     Denies pain/ FOL/ floaters.     No gtts.       Using +4.00 readers for near and +2.50 distance.   Last edited by Carolina Ward on 7/16/2024  9:33 AM.            Assessment /Plan     For exam results, see Encounter Report.    Nuclear sclerotic cataract, bilateral    Dry eye syndrome, bilateral      1. Nuclear sclerotic cataract, bilateral  Moderate  Pt not yet bothered enough for surgery  New glasses Rx today    F/u 1 year, routine exam with BAT    2. Dry eye syndrome, bilateral  Mild  ATs PRN

## 2024-08-27 ENCOUNTER — TELEPHONE (OUTPATIENT)
Dept: INTERNAL MEDICINE | Facility: CLINIC | Age: 84
End: 2024-08-27
Payer: MEDICARE

## 2024-08-27 NOTE — TELEPHONE ENCOUNTER
Called and spoke to pt to assist with scheduling Pre-op exam prior to surgery on 10/3/24. Pt is schedule on 9/17/24 with  at 2:30 pm. Pt moira

## 2024-09-17 ENCOUNTER — OFFICE VISIT (OUTPATIENT)
Dept: INTERNAL MEDICINE | Facility: CLINIC | Age: 84
End: 2024-09-17
Payer: MEDICARE

## 2024-09-17 VITALS
TEMPERATURE: 97 F | SYSTOLIC BLOOD PRESSURE: 148 MMHG | RESPIRATION RATE: 18 BRPM | OXYGEN SATURATION: 98 % | HEIGHT: 62 IN | DIASTOLIC BLOOD PRESSURE: 82 MMHG | HEART RATE: 71 BPM | WEIGHT: 155.19 LBS | BODY MASS INDEX: 28.56 KG/M2

## 2024-09-17 DIAGNOSIS — N18.31 CHRONIC KIDNEY DISEASE, STAGE 3A: ICD-10-CM

## 2024-09-17 DIAGNOSIS — Z01.810 PREOPERATIVE CARDIOVASCULAR EXAMINATION: Primary | ICD-10-CM

## 2024-09-17 DIAGNOSIS — M25.562 LEFT KNEE PAIN, UNSPECIFIED CHRONICITY: ICD-10-CM

## 2024-09-17 PROCEDURE — 1159F MED LIST DOCD IN RCRD: CPT | Mod: HCNC,CPTII,GC,S$GLB

## 2024-09-17 PROCEDURE — 3288F FALL RISK ASSESSMENT DOCD: CPT | Mod: HCNC,CPTII,GC,S$GLB

## 2024-09-17 PROCEDURE — 1126F AMNT PAIN NOTED NONE PRSNT: CPT | Mod: HCNC,CPTII,GC,S$GLB

## 2024-09-17 PROCEDURE — 3077F SYST BP >= 140 MM HG: CPT | Mod: HCNC,CPTII,GC,S$GLB

## 2024-09-17 PROCEDURE — 99214 OFFICE O/P EST MOD 30 MIN: CPT | Mod: HCNC,GC,S$GLB,

## 2024-09-17 PROCEDURE — 1101F PT FALLS ASSESS-DOCD LE1/YR: CPT | Mod: HCNC,CPTII,GC,S$GLB

## 2024-09-17 PROCEDURE — 3079F DIAST BP 80-89 MM HG: CPT | Mod: HCNC,CPTII,GC,S$GLB

## 2024-09-17 PROCEDURE — 99999 PR PBB SHADOW E&M-EST. PATIENT-LVL III: CPT | Mod: PBBFAC,HCNC,GC,

## 2024-09-17 NOTE — PROGRESS NOTES
Clinic Note  9/17/2024      Subjective:       Patient ID:  Zack is a 84 y.o. female being seen for an established visit.    Chief Complaint: Pre-op Exam (Lt. Knee Replacement )    Patient is an 84F with HTN, CKD3a, HLD, AA, GERD, insomnia, Pre-DM, inflammatory spondylopathy of the lumbar spine here for preoperative risk stratification prior to operation 10/3/24.       Surgery: L knee replacement   Date: 10/3/24  Surgeon: Dr. Tim De Oliveira    Patient specific questions  Feeling well: Y  Recent fever: N  Previous surgery: Y  Complications: N  Reactions to anesthesia: N  Allergies to medications: Sulfacetamide   Anticoagulation: N    Surgery: Risk for Cardiac Events  High Risk: aortic, thoracic, other major vascular surgery (EKG last 30 days)  Intermediate Risk: intraperitoneal, carotid endarterectomy, head and  neck, orthopedic, prostate surgery (EKG if risk factors)  Low Risk: endoscopic, cataract, breast, or ambulatory procedure (no EKG indicated if asymptomatic)    METs >/= 4 (consider stress testing < 4, cannot answer yes to at least one)  Can you walk 2 blocks at 4 mph on flat surface without stopping?  Yes  Can you climb 1-2 flight of stairs without stopping? Yes  Can you perform household chores like vacuuming? Yes    Review of Systems   Constitutional:  Negative for chills and fever.   HENT: Negative.     Eyes: Negative.    Respiratory:  Negative for cough and shortness of breath.    Cardiovascular:  Negative for chest pain and palpitations.   Gastrointestinal:  Negative for nausea and vomiting.   Genitourinary:  Negative for dysuria and frequency.   Musculoskeletal:  Positive for joint pain. Negative for myalgias.   Skin: Negative.    Neurological: Negative.    Endo/Heme/Allergies: Negative.    Psychiatric/Behavioral: Negative.         Medication List with Changes/Refills   Current Medications    ACETAMINOPHEN (TYLENOL) 650 MG TBSR    Take 650 mg by mouth every 8 (eight) hours.    CHLORTHALIDONE (HYGROTEN)  "25 MG TAB    TAKE 1 TABLET  BY MOUTH ONCE DAILY.    CYCLOBENZAPRINE (FLEXERIL) 5 MG TABLET    Take 5 mg by mouth nightly as needed.    GABAPENTIN (NEURONTIN) 100 MG CAPSULE    Take 1 capsule (100 mg total) by mouth every evening.    ROSUVASTATIN (CRESTOR) 10 MG TABLET    Take 1 tablet (10 mg total) by mouth once daily.    VITAMIN D 1000 UNITS TAB    Take 1,000 Units by mouth once daily.       Patient Active Problem List   Diagnosis    Nuclear sclerosis - Both Eyes    Gastroesophageal reflux disease without esophagitis    Insomnia    HLD (hyperlipidemia)    Atherosclerosis of aorta    Essential hypertension    Lumbar spondylosis    Myofascial pain syndrome    Spinal stenosis of lumbosacral region    SI (sacroiliac) joint dysfunction    Lumbar facet arthropathy    Arthropathy of cervical facet joint    DDD (degenerative disc disease), lumbosacral    Weakness    Chronic pain    Ascending aorta enlargement    Inflammatory spondylopathy of lumbar region    History of colon polyps    Right-sided low back pain without sciatica    Decreased strength of lower extremity    Overweight (BMI 25.0-29.9)    Risk for falls           Objective:      BP (!) 148/82 (BP Location: Left arm, Patient Position: Sitting, BP Method: Small (Manual))   Pulse 71   Temp 97.2 °F (36.2 °C) (Temporal)   Resp 18   Ht 5' 2" (1.575 m)   Wt 70.4 kg (155 lb 3.3 oz)   SpO2 98%   BMI 28.39 kg/m²   Estimated body mass index is 28.39 kg/m² as calculated from the following:    Height as of this encounter: 5' 2" (1.575 m).    Weight as of this encounter: 70.4 kg (155 lb 3.3 oz).  Physical Exam  Constitutional:       General: She is not in acute distress.     Appearance: Normal appearance. She is not ill-appearing.   HENT:      Head: Normocephalic and atraumatic.      Right Ear: External ear normal. There is no impacted cerumen.      Left Ear: External ear normal. There is no impacted cerumen.      Nose: Nose normal. No congestion.      Mouth/Throat:    "   Mouth: Mucous membranes are moist.      Pharynx: Oropharynx is clear.   Eyes:      General: No scleral icterus.     Extraocular Movements: Extraocular movements intact.   Cardiovascular:      Rate and Rhythm: Normal rate and regular rhythm.      Pulses: Normal pulses.      Heart sounds: Normal heart sounds.   Pulmonary:      Effort: Pulmonary effort is normal.      Breath sounds: Normal breath sounds.   Abdominal:      General: Abdomen is flat.      Palpations: Abdomen is soft.   Musculoskeletal:         General: No swelling or deformity. Normal range of motion.      Cervical back: Normal range of motion and neck supple.   Skin:     General: Skin is warm and dry.   Neurological:      General: No focal deficit present.      Mental Status: She is alert and oriented to person, place, and time.      Motor: No weakness.   Psychiatric:         Mood and Affect: Mood normal.         Behavior: Behavior normal.         Assessment and Plan:     Cardiovascular Risk Assessment:  Active cardiac issues:  Active decompensated heart failure? No   Unstable angina?  No   Significant uncontrolled arrhythmias? No   Severe valvular heart disease-Aortic or Mitral Stenosis? No   Recent MI or coronary revascularization < 30 days? No       Revised Cardiac Risk Index   High risk surgery: No  History of ischemic heart disease: No  History of congestive heart failure: No  History of stroke: No  Insulin dependent diabetes: No  Cr > 2: No  0 points, class I risk, 3.9% risk of cardiac event 2014 ACC/AHA Perioperative Guidelines  Known or risk factors for CAD: Yes  High risk of MACE: No  Functional capacity < 4 METs: No, proceed without further testing     RCRI Calculator Class and Risk percentage: 3.9%    Pt has no active cardiac condition (ACS/USA, decompenstated CHF, significant arrhythmias or severe valvular disease) and can easily achieve 4 METS.  As such, pt does not require further cardiac evaluation prior to undergoing surgery.  Pt should  remain on beta-blockers throughout the entire fortino-procedure time period.  Aspirin therapy can be held but should be restarted as soon as safely possible.  The remaining cardiac meds can be held as needed but should also be restarted after the procedure. These recommendations follow the most current Guideline on Perioperative Cardiovascular Evaluation and Management of Patients Undergoing Noncardiac Surgery released by the ACC/AHA (JACC 2014.07.944).    No contraindication for anesthesia and surgery at this time.         Problem List Items Addressed This Visit    None  Visit Diagnoses       Preoperative cardiovascular examination    -  Primary    Medically optimized for surgery.    Left knee pain, unspecified chronicity        Chronic kidney disease, stage 3a        Stable based on CMP 6/4/24. Continue PO hydration and antihypertensive therapy.          Health Maintenance Due   Topic Date Due    RSV Vaccine (Age 60+ and Pregnant patients) (1 - 1-dose 60+ series) Never done    Influenza Vaccine (1) 09/01/2024    COVID-19 Vaccine (4 - 2023-24 season) 09/01/2024       Follow Up:   No follow-ups on file.    Preoperative risk stratification  Low risk for MACE. RCRI 3.9%. No supplmental O2, volume overload, chest pain. EKG reviewed from outside office, shows NSR  - Can take Chlorthalidone day of procedure  - Hold ASA 7d prior to procedure, restart as soon as possible (at discretion of surgeon)     Other Orders Placed This Visit:  No orders of the defined types were placed in this encounter.          Ema Cano MD  Internal Medicine PGY-II  [facility]

## 2024-09-17 NOTE — PROGRESS NOTES
I have interviewed and examined the patient w/ the resident,   I agree w/ the impression and plan as outlined above.  EKG , CXR, lab and UA pending      Rosemarie Perez MD-Staff

## 2024-10-21 ENCOUNTER — DOCUMENT SCAN (OUTPATIENT)
Dept: HOME HEALTH SERVICES | Facility: HOSPITAL | Age: 84
End: 2024-10-21
Payer: MEDICARE

## 2024-10-22 ENCOUNTER — EXTERNAL HOME HEALTH (OUTPATIENT)
Dept: HOME HEALTH SERVICES | Facility: HOSPITAL | Age: 84
End: 2024-10-22
Payer: MEDICARE

## 2024-11-19 RX ORDER — ROSUVASTATIN CALCIUM 10 MG/1
10 TABLET, COATED ORAL DAILY
Qty: 90 TABLET | Refills: 3 | Status: SHIPPED | OUTPATIENT
Start: 2024-11-19

## 2024-11-19 NOTE — TELEPHONE ENCOUNTER
Last ordered 11/01/2023   Last visit, annual, 11/01/2023   Was seen in between that time in the resident clinic.    Upcoming annual 12/18/2024

## 2024-12-11 ENCOUNTER — OFFICE VISIT (OUTPATIENT)
Dept: URGENT CARE | Facility: CLINIC | Age: 84
End: 2024-12-11
Payer: MEDICARE

## 2024-12-11 VITALS
HEIGHT: 62 IN | RESPIRATION RATE: 20 BRPM | OXYGEN SATURATION: 96 % | DIASTOLIC BLOOD PRESSURE: 84 MMHG | TEMPERATURE: 100 F | BODY MASS INDEX: 27.05 KG/M2 | HEART RATE: 115 BPM | SYSTOLIC BLOOD PRESSURE: 139 MMHG | WEIGHT: 147 LBS

## 2024-12-11 DIAGNOSIS — R52 GENERALIZED BODY ACHES: ICD-10-CM

## 2024-12-11 DIAGNOSIS — N30.00 ACUTE CYSTITIS WITHOUT HEMATURIA: Primary | ICD-10-CM

## 2024-12-11 DIAGNOSIS — R53.83 FATIGUE, UNSPECIFIED TYPE: ICD-10-CM

## 2024-12-11 DIAGNOSIS — R50.9 FEVER, UNSPECIFIED FEVER CAUSE: ICD-10-CM

## 2024-12-11 DIAGNOSIS — R30.0 DYSURIA: ICD-10-CM

## 2024-12-11 LAB
BILIRUB UR QL STRIP: NEGATIVE
CTP QC/QA: YES
CTP QC/QA: YES
FLUAV AG NPH QL: NEGATIVE
FLUBV AG NPH QL: NEGATIVE
GLUCOSE UR QL STRIP: NEGATIVE
KETONES UR QL STRIP: NEGATIVE
LEUKOCYTE ESTERASE UR QL STRIP: POSITIVE
PH, POC UA: 6
POC BLOOD, URINE: POSITIVE
POC NITRATES, URINE: POSITIVE
PROT UR QL STRIP: NEGATIVE
SARS-COV-2 AG RESP QL IA.RAPID: NEGATIVE
SP GR UR STRIP: 1.01 (ref 1–1.03)
UROBILINOGEN UR STRIP-ACNC: ABNORMAL (ref 0.1–1.1)

## 2024-12-11 PROCEDURE — 99214 OFFICE O/P EST MOD 30 MIN: CPT | Mod: S$GLB,,,

## 2024-12-11 PROCEDURE — 87811 SARS-COV-2 COVID19 W/OPTIC: CPT | Mod: QW,S$GLB,,

## 2024-12-11 PROCEDURE — 87804 INFLUENZA ASSAY W/OPTIC: CPT | Mod: QW,,,

## 2024-12-11 PROCEDURE — 81003 URINALYSIS AUTO W/O SCOPE: CPT | Mod: QW,S$GLB,,

## 2024-12-11 RX ORDER — CEFUROXIME AXETIL 500 MG/1
500 TABLET ORAL EVERY 12 HOURS
Qty: 14 TABLET | Refills: 0 | Status: SHIPPED | OUTPATIENT
Start: 2024-12-11 | End: 2024-12-18

## 2024-12-11 NOTE — PROGRESS NOTES
"Subjective:      Patient ID: Zack Hopper is a 84 y.o. female.    Vitals:  height is 5' 2" (1.575 m) and weight is 66.7 kg (147 lb). Her temperature is 99.9 °F (37.7 °C). Her blood pressure is 139/84 and her pulse is 115 (abnormal). Her respiration is 20 and oxygen saturation is 96%.     Chief Complaint: Fever    Pt c/o fever (tmax 102.4), body aches, chills, fatigue starting today. Treatments tried: none.  Patient reports minimal cough but has not felt like she has even needed to take cough medication.  Lung sounds are clear bilaterally with no adventitious breath sounds.  No known exposures to illness.  Patient reports she was out hunting camp over the weekend where it was very cold but does not recall being outside for very or exposed to the cold elements.     Fever   Associated symptoms include coughing. Pertinent negatives include no congestion or wheezing.       Constitution: Positive for chills, fatigue and fever.   HENT:  Negative for congestion and postnasal drip.    Respiratory:  Positive for cough. Negative for sputum production, shortness of breath and wheezing.    Musculoskeletal:  Positive for muscle ache.   Neurological: Negative.       Objective:     Physical Exam   Constitutional: She is oriented to person, place, and time. She appears well-developed. She is cooperative.  Non-toxic appearance. She does not appear ill. No distress.   HENT:   Head: Normocephalic and atraumatic.   Ears:   Right Ear: Hearing, tympanic membrane, external ear and ear canal normal.   Left Ear: Hearing, tympanic membrane, external ear and ear canal normal.   Nose: Nose normal. No mucosal edema, rhinorrhea, nasal deformity or congestion. No epistaxis. Right sinus exhibits no maxillary sinus tenderness and no frontal sinus tenderness. Left sinus exhibits no maxillary sinus tenderness and no frontal sinus tenderness.   Mouth/Throat: Uvula is midline, oropharynx is clear and moist and mucous membranes are normal. No " trismus in the jaw. Normal dentition. No uvula swelling. No oropharyngeal exudate, posterior oropharyngeal edema or posterior oropharyngeal erythema.   Eyes: Conjunctivae and lids are normal. No scleral icterus.   Neck: Trachea normal and phonation normal. Neck supple. No edema present. No erythema present. No neck rigidity present.   Cardiovascular: Normal rate, regular rhythm and normal heart sounds.   Pulmonary/Chest: Effort normal and breath sounds normal. No respiratory distress. She has no decreased breath sounds. She has no wheezes. She has no rhonchi. She has no rales.   Abdominal: Normal appearance.   Musculoskeletal: Normal range of motion.         General: No deformity. Normal range of motion.   Neurological: She is alert and oriented to person, place, and time. She displays no weakness. She exhibits normal muscle tone.   Skin: Skin is warm, dry, intact, not diaphoretic and not pale.   Psychiatric: Her speech is normal and behavior is normal. Mood, judgment and thought content normal.   Nursing note and vitals reviewed.      Assessment:     1. Acute cystitis without hematuria    2. Fever, unspecified fever cause    3. Generalized body aches    4. Fatigue, unspecified type    5. Dysuria        Plan:       Acute cystitis without hematuria  -     cefUROXime (CEFTIN) 500 MG tablet; Take 1 tablet (500 mg total) by mouth every 12 (twelve) hours. for 7 days  Dispense: 14 tablet; Refill: 0  -     CULTURE, URINE    Fever, unspecified fever cause  -     SARS Coronavirus 2 Antigen, POCT Manual Read  -     POCT Influenza A/B Rapid Antigen  -     POCT Urinalysis, Dipstick, Manual, W/O Scope    Generalized body aches    Fatigue, unspecified type    Dysuria  -     POCT Urinalysis, Dipstick, Manual, W/O Scope      COVID:  Negative  FLU:  Negative    Discussed conservative symptom management to include antipyretics for fever control, vitamin-C or elderberry for immune support, and close follow up if symptoms should worsen  or persist    Discussed medication with patient who acknowledges understanding and is agreeable to POC. Follow up with primary care. Increase fluid intake. Red flags for ER discussed.    Addendum 12/11/2024 at 4:59 p.m:   Patient returned to clinic just after departure and wanted a urinalysis as she forgot to request as she was here.  It does appear that the patient has UTI and antibiotics will be sent accordingly.  Discussed with patient this may explain some of the rest of her symptoms.

## 2024-12-18 ENCOUNTER — TELEPHONE (OUTPATIENT)
Dept: INTERNAL MEDICINE | Facility: CLINIC | Age: 84
End: 2024-12-18
Payer: MEDICARE

## 2024-12-18 DIAGNOSIS — R53.83 FATIGUE, UNSPECIFIED TYPE: Primary | ICD-10-CM

## 2024-12-18 NOTE — TELEPHONE ENCOUNTER
Returned pt called and she stated she had UTI 7-10 days ago and she completed the antibiotic. Now she states she feels she doesn't have any energy and requesting blood work

## 2024-12-18 NOTE — TELEPHONE ENCOUNTER
----- Message from Divya sent at 12/18/2024  1:58 PM CST -----  Contact: [phone]  [email or web address]     Patient is calling for Medical Advice regarding: pt is calling because she has been feeling lethargic and would like to know if she should have blood work done to see if there are any issues. States that she is struggling to do her PT for her knee. Please call to advise     How long has patient had these symptoms:     Pharmacy name and phone#:    Patient wants a call back or thru [facility]:    Comments:    Please advise patient replies from provider may take up to 48 hours.

## 2024-12-19 ENCOUNTER — LAB VISIT (OUTPATIENT)
Dept: LAB | Facility: HOSPITAL | Age: 84
End: 2024-12-19
Attending: NURSE PRACTITIONER
Payer: MEDICARE

## 2024-12-19 DIAGNOSIS — R53.83 FATIGUE, UNSPECIFIED TYPE: ICD-10-CM

## 2024-12-19 LAB
BILIRUB UR QL STRIP: NEGATIVE
CLARITY UR REFRACT.AUTO: CLEAR
COLOR UR AUTO: YELLOW
GLUCOSE UR QL STRIP: NEGATIVE
HGB UR QL STRIP: NEGATIVE
KETONES UR QL STRIP: NEGATIVE
LEUKOCYTE ESTERASE UR QL STRIP: NEGATIVE
NITRITE UR QL STRIP: NEGATIVE
PH UR STRIP: 7 [PH] (ref 5–8)
PROT UR QL STRIP: NEGATIVE
SP GR UR STRIP: 1.02 (ref 1–1.03)
URN SPEC COLLECT METH UR: NORMAL

## 2024-12-19 PROCEDURE — 81003 URINALYSIS AUTO W/O SCOPE: CPT | Mod: HCNC | Performed by: NURSE PRACTITIONER

## 2024-12-26 ENCOUNTER — OFFICE VISIT (OUTPATIENT)
Dept: URGENT CARE | Facility: CLINIC | Age: 84
End: 2024-12-26
Payer: MEDICARE

## 2024-12-26 VITALS
HEIGHT: 62 IN | WEIGHT: 147 LBS | DIASTOLIC BLOOD PRESSURE: 84 MMHG | BODY MASS INDEX: 27.05 KG/M2 | OXYGEN SATURATION: 97 % | RESPIRATION RATE: 16 BRPM | HEART RATE: 87 BPM | SYSTOLIC BLOOD PRESSURE: 159 MMHG | TEMPERATURE: 98 F

## 2024-12-26 DIAGNOSIS — Z86.79 HISTORY OF HYPERTENSION: ICD-10-CM

## 2024-12-26 DIAGNOSIS — N30.00 ACUTE CYSTITIS WITHOUT HEMATURIA: ICD-10-CM

## 2024-12-26 DIAGNOSIS — R35.0 URINARY FREQUENCY: Primary | ICD-10-CM

## 2024-12-26 LAB
BILIRUB UR QL STRIP: NEGATIVE
GLUCOSE UR QL STRIP: NEGATIVE
KETONES UR QL STRIP: NEGATIVE
LEUKOCYTE ESTERASE UR QL STRIP: POSITIVE
PH, POC UA: 6
POC BLOOD, URINE: POSITIVE
POC NITRATES, URINE: NEGATIVE
PROT UR QL STRIP: NEGATIVE
SP GR UR STRIP: 1.02 (ref 1–1.03)
UROBILINOGEN UR STRIP-ACNC: ABNORMAL (ref 0.1–1.1)

## 2024-12-26 PROCEDURE — 81003 URINALYSIS AUTO W/O SCOPE: CPT | Mod: QW,S$GLB,, | Performed by: NURSE PRACTITIONER

## 2024-12-26 PROCEDURE — 99214 OFFICE O/P EST MOD 30 MIN: CPT | Mod: S$GLB,,, | Performed by: NURSE PRACTITIONER

## 2024-12-26 RX ORDER — CIPROFLOXACIN 250 MG/1
250 TABLET, FILM COATED ORAL 2 TIMES DAILY
Qty: 10 TABLET | Refills: 0 | Status: SHIPPED | OUTPATIENT
Start: 2024-12-26 | End: 2024-12-31

## 2024-12-26 NOTE — PATIENT INSTRUCTIONS
Cipro twice a day for 5 days    Urine collected today will be sent to the lab for culture, expect results in 3-7 days.  You can be looking on the [facility] MyChart for results or someone from the clinic will be contacting you once we have received results and had time to review them.      Return to clinic or seek medical re-evaluation if symptoms worsen or fail to improve after 48 hours of antibiotics    Increase fluid intake to help flush urinary system    For your recurrent UTIs:  Behavioral changes to help decrease UTI recurrences   -increase water intake (6 to 8 bottles water per day)   -don't hold urine, void every 2 to 3 hours   -prevent constipation (miralax capful daily if necessary) to have a bowel movement daily and keep stools soft  -cut down on caffeine,drink mainly water  -no douching   -void immediately after sexual intercourse  -wipe front to back      OTC meds to try (go to the pharmacist and ask where they are, they are over the counter)  -Use lactobacillus probiotic in capsule form in vagina once nightly for 10 days if you feel like you have an infection coming on   -cranberry pills 500mg tabs TID, can buy over the counter  -take a probiotic daily

## 2024-12-26 NOTE — PROGRESS NOTES
"Subjective:      Patient ID: Zack Hopper is a 84 y.o. female.    Vitals:  height is 5' 2" (1.575 m) and weight is 66.7 kg (147 lb). Her temperature is 98.3 °F (36.8 °C). Her blood pressure is 159/84 (abnormal) and her pulse is 87. Her respiration is 16 and oxygen saturation is 97%.     Chief Complaint: Urinary Frequency    Pt c/o urinary frequency and pelvic discomfort x2 days. Pt was seen on 12/11/24 and dx with a UTI and prescribed abx.     Patient recently treated for UTIs with positive urine culture E coli sensitive to antibiotics that were prescribed.  Return if symptoms x2 days.    Urinary Frequency   Associated symptoms include frequency. Pertinent negatives include no chills, flank pain, nausea or vomiting.       Constitution: Negative for appetite change, chills and fever.   Gastrointestinal:  Negative for abdominal pain, nausea and vomiting.   Genitourinary:  Positive for frequency and pelvic pain (Suprapubic pressure). Negative for flank pain.   Musculoskeletal:  Negative for back pain and muscle ache.      Objective:     Physical Exam   Constitutional: She is oriented to person, place, and time.  Non-toxic appearance. She does not appear ill. No distress.   HENT:   Head: Normocephalic and atraumatic.   Nose: Nose normal.   Mouth/Throat: Mucous membranes are moist.   Eyes: Conjunctivae are normal.   Cardiovascular: Normal rate.   Pulmonary/Chest: Effort normal. No respiratory distress.   Abdominal: Normal appearance. There is no left CVA tenderness and no right CVA tenderness.   Neurological: no focal deficit. She is alert and oriented to person, place, and time.   Skin: Skin is warm, dry and no rash. Capillary refill takes 2 to 3 seconds.   Psychiatric: Her behavior is normal. Mood normal.   Nursing note and vitals reviewed.      Assessment:     1. Urinary frequency    2. History of hypertension    3. Acute cystitis without hematuria      UA:   125 wbc's, 25 rbc's, neg nitrites  Urine culture " pending      Last GFR 12/19/24 greater than 60  Plan:       Urinary frequency  -     POCT Urinalysis, Dipstick, Manual, W/O Scope    History of hypertension    Acute cystitis without hematuria  -     CULTURE, URINE  -     ciprofloxacin HCl (CIPRO) 250 MG tablet; Take 1 tablet (250 mg total) by mouth 2 (two) times daily. for 5 days  Dispense: 10 tablet; Refill: 0

## 2025-02-22 DIAGNOSIS — Z00.00 ENCOUNTER FOR MEDICARE ANNUAL WELLNESS EXAM: ICD-10-CM

## 2025-02-27 ENCOUNTER — OFFICE VISIT (OUTPATIENT)
Dept: URGENT CARE | Facility: CLINIC | Age: 85
End: 2025-02-27
Payer: MEDICARE

## 2025-02-27 VITALS
RESPIRATION RATE: 16 BRPM | WEIGHT: 147 LBS | HEART RATE: 84 BPM | TEMPERATURE: 99 F | HEIGHT: 62 IN | DIASTOLIC BLOOD PRESSURE: 102 MMHG | OXYGEN SATURATION: 96 % | SYSTOLIC BLOOD PRESSURE: 167 MMHG | BODY MASS INDEX: 27.05 KG/M2

## 2025-02-27 DIAGNOSIS — I10 ESSENTIAL HYPERTENSION: ICD-10-CM

## 2025-02-27 DIAGNOSIS — R05.9 COUGH, UNSPECIFIED TYPE: Primary | ICD-10-CM

## 2025-02-27 DIAGNOSIS — R05.2 SUBACUTE COUGH: ICD-10-CM

## 2025-02-27 PROCEDURE — 99213 OFFICE O/P EST LOW 20 MIN: CPT | Mod: S$GLB,,, | Performed by: NURSE PRACTITIONER

## 2025-02-27 RX ORDER — BENZONATATE 100 MG/1
100 CAPSULE ORAL 3 TIMES DAILY PRN
Qty: 15 CAPSULE | Refills: 0 | Status: SHIPPED | OUTPATIENT
Start: 2025-02-27 | End: 2025-03-04

## 2025-02-27 RX ORDER — PROMETHAZINE HYDROCHLORIDE AND DEXTROMETHORPHAN HYDROBROMIDE 6.25; 15 MG/5ML; MG/5ML
5 SYRUP ORAL NIGHTLY PRN
Qty: 50 ML | Refills: 0 | Status: SHIPPED | OUTPATIENT
Start: 2025-02-27 | End: 2025-03-09

## 2025-02-27 NOTE — PATIENT INSTRUCTIONS
You were seen and evaluated at urgent care today.  Your chest x-ray shows no signs of infection.  We have sent you medications help with your cough.  Please start taking your blood pressure medications as prescribed.  Please follow-up with your PCP as needed.  Please proceed to the ED for any worsening symptoms such as chest pain, shortness of breath, fever not controlled with Tylenol or ibuprofen or uncontrolled pain.      Our goal at [facility] is to always give you outstanding care and exceptional service. You may receive a survey by mail or e-mail in the next week regarding your experience in our ED. We would greatly appreciate your completing and returning the survey. Your feedback provides us with a way to recognize our staff who give very good care and it helps us learn how to improve when your experience was below our aspiration of excellence.

## 2025-02-27 NOTE — PROGRESS NOTES
"Subjective:      Patient ID: Zack Hopper is a 84 y.o. female.    Vitals:  height is 5' 2" (1.575 m) and weight is 66.7 kg (147 lb). Her oral temperature is 98.5 °F (36.9 °C). Her blood pressure is 167/102 (abnormal) and her pulse is 84. Her respiration is 16 and oxygen saturation is 96%.     Chief Complaint: Cough    Patient is an 84-year-old female presenting for cough.  Patient states symptoms have been ongoing for the past 10 days.  Patient denies any fever or chills.  Patient has taken over-the-counter cough medications with no relief.    Cough  This is a new problem. The current episode started 1 to 4 weeks ago (x 10 days). The problem has been unchanged. The problem occurs constantly. The cough is Non-productive. Pertinent negatives include no chest pain, chills, fever, headaches, postnasal drip, sore throat or shortness of breath. Nothing aggravates the symptoms. She has tried nothing for the symptoms. The treatment provided no relief.       Constitution: Negative for chills and fever.   HENT:  Negative for congestion, postnasal drip, sinus pain, sinus pressure, sore throat and trouble swallowing.    Neck: neck negative.   Cardiovascular:  Negative for chest pain and sob on exertion.   Eyes: Negative.    Respiratory:  Positive for cough. Negative for shortness of breath.    Gastrointestinal:  Negative for abdominal pain, nausea, vomiting and diarrhea.   Endocrine: negative.   Genitourinary:  Negative for dysuria, frequency, urgency and flank pain.   Musculoskeletal: Negative.    Skin: Negative.    Allergic/Immunologic: Negative.    Neurological:  Negative for dizziness and headaches.   Hematologic/Lymphatic: Negative.    Psychiatric/Behavioral: Negative.        Objective:     Physical Exam   Constitutional: She is oriented to person, place, and time. She appears well-developed. She is cooperative.  Non-toxic appearance. She does not appear ill. No distress.   HENT:   Head: Normocephalic and " atraumatic.   Ears:   Right Ear: Hearing, tympanic membrane, external ear and ear canal normal.   Left Ear: Hearing, tympanic membrane, external ear and ear canal normal.   Nose: Nose normal. No mucosal edema, rhinorrhea or nasal deformity. No epistaxis. Right sinus exhibits no maxillary sinus tenderness and no frontal sinus tenderness. Left sinus exhibits no maxillary sinus tenderness and no frontal sinus tenderness.   Mouth/Throat: Uvula is midline, oropharynx is clear and moist and mucous membranes are normal. No trismus in the jaw. Normal dentition. No uvula swelling. No oropharyngeal exudate, posterior oropharyngeal edema or posterior oropharyngeal erythema.   Eyes: Conjunctivae and lids are normal. No scleral icterus.   Neck: Trachea normal and phonation normal. Neck supple. No edema present. No erythema present. No neck rigidity present.   Cardiovascular: Normal rate, regular rhythm, normal heart sounds and normal pulses.   Pulmonary/Chest: Effort normal and breath sounds normal. No respiratory distress. She has no decreased breath sounds. She has no rhonchi.   Abdominal: Normal appearance.   Musculoskeletal: Normal range of motion.         General: No deformity. Normal range of motion.   Neurological: She is alert and oriented to person, place, and time. She exhibits normal muscle tone. Coordination normal.   Skin: Skin is warm, dry, intact, not diaphoretic and not pale.   Psychiatric: Her speech is normal and behavior is normal. Judgment and thought content normal.   Nursing note and vitals reviewed.    Assessment:     No diagnosis found.    Plan:       There are no diagnoses linked to this encounter.      Medical Decision Making:   Initial Assessment:   Evaluation of an 85 yo female presenting for cough for the past 10 days.  Patient denies any fever, chills, congestion.  Patient states symptoms have been ongoing for 10 days.  On exam pt is A&Ox3.  Hypertensive on initial exam.  All other vital signs  stable.  Patient denies any headache, dizziness or blurred vision. Nonfebrile and nontoxic appearing.  Mucous membranes pink and moist. Tonsils with no redness, erythema or exudates. Breath sounds clear bilaterally.  Abdomen soft and nontender. No rebound or guarding appreciated on exam.   BS WNL.  Pt speaking in full sentences.  Steady gait appreciated. Cap refill < 3 seconds.      Differential Diagnosis:   Viral URI, cough, asthma exacerbation, COPD exacerbation, others  Clinical Tests:   Radiological Study: Ordered and Reviewed  Urgent Care Management:  I will get chest x-ray and reassess.    X-ray independently reviewed by myself and Radiology.  Negative for any acute abnormalities.  Patient advised that we will prescribe medications to help with her cough.  Advised she should start retaking her blood pressure medication.  Patient states she does not want to take it and has not been taking it.  Patient advised to follow up with PCP as needed.  Return precautions discussed.  Patient verbalized understanding of this plan of care.  All questions and concerns addressed.

## 2025-03-03 ENCOUNTER — OFFICE VISIT (OUTPATIENT)
Dept: INTERNAL MEDICINE | Facility: CLINIC | Age: 85
End: 2025-03-03
Payer: MEDICARE

## 2025-03-03 VITALS
RESPIRATION RATE: 18 BRPM | TEMPERATURE: 97 F | WEIGHT: 151.69 LBS | OXYGEN SATURATION: 96 % | BODY MASS INDEX: 27.92 KG/M2 | DIASTOLIC BLOOD PRESSURE: 72 MMHG | SYSTOLIC BLOOD PRESSURE: 130 MMHG | HEIGHT: 62 IN | HEART RATE: 69 BPM

## 2025-03-03 DIAGNOSIS — R73.03 PRE-DIABETES: ICD-10-CM

## 2025-03-03 DIAGNOSIS — R73.01 IMPAIRED FASTING GLUCOSE: ICD-10-CM

## 2025-03-03 DIAGNOSIS — Z12.31 ENCOUNTER FOR SCREENING MAMMOGRAM FOR BREAST CANCER: ICD-10-CM

## 2025-03-03 DIAGNOSIS — N18.30 STAGE 3 CHRONIC KIDNEY DISEASE, UNSPECIFIED WHETHER STAGE 3A OR 3B CKD: ICD-10-CM

## 2025-03-03 DIAGNOSIS — I70.0 AORTIC ATHEROSCLEROSIS: ICD-10-CM

## 2025-03-03 DIAGNOSIS — R05.8 POST-VIRAL COUGH SYNDROME: ICD-10-CM

## 2025-03-03 DIAGNOSIS — I10 ESSENTIAL HYPERTENSION: Primary | ICD-10-CM

## 2025-03-03 DIAGNOSIS — Z23 NEED FOR VACCINATION: ICD-10-CM

## 2025-03-03 DIAGNOSIS — E78.2 MIXED HYPERLIPIDEMIA: ICD-10-CM

## 2025-03-03 PROCEDURE — 99999 PR PBB SHADOW E&M-EST. PATIENT-LVL III: CPT | Mod: PBBFAC,,, | Performed by: INTERNAL MEDICINE

## 2025-03-03 RX ORDER — PROMETHAZINE HYDROCHLORIDE AND DEXTROMETHORPHAN HYDROBROMIDE 6.25; 15 MG/5ML; MG/5ML
5 SYRUP ORAL NIGHTLY PRN
Qty: 90 ML | Refills: 0 | Status: SHIPPED | OUTPATIENT
Start: 2025-03-03 | End: 2025-03-13

## 2025-03-03 RX ORDER — CHLORTHALIDONE 25 MG/1
25 TABLET ORAL DAILY PRN
Start: 2025-03-03

## 2025-03-03 NOTE — PROGRESS NOTES
CC:     84 y.o. female presents for f/up chronic medical problems, HTN, HLD and CKD2    HTN, tx chlorthalidone prn  Denied HA or dz  BP today==> 130/72    CKD 2 tx : hydration  Denied hematuria or dysuria  eGFR==>    HLD, tx rosuvastatin 10mg  Aortic atherosclerosis  Denied angina or SALAS  LDL==> pending    IFBS/Pre-DM, tx: diet  Denied increased thirst or urination  A1C==> pending    Recent URI, tx : conservative, except for Rx CS  75% better but cough keeping her up at night    Non smoker  Social ETOH    HEALTH MAINTENANCE:  Cholesterol/labs: reviewed  C-scope: 10/2020, neg- no f/up rec  Recall, 2015  One 3 mm polyp in the ascending colon. Resected   and retrieved. Tubular adenoma  - Melanosis in the colon.  - The examination was otherwise normal on direct   and retroflexion views.  EGD: 9/2015- occ dysphagia  Impression: - LA Grade B reflux esophagitis.  - Small hiatus hernia.  - Non-bleeding erosive gastropathy. Biopsied.  - Normal examined duodenum.  WWE: aged out, asx  Mammo: pending  DEXA: 2024   Normal, L-spine xary rec 2/2 to DEXA- + DDD/DJD , no fx  AAA evaluation: 11/2018- Negative  EYE exam: UTD  DDS exam: UTD    VACCINATIONS:  TD: 2/2009, 2019  Flu: yearly  Pneumovax: 8/2014,  Prevnar: 10/2015, 7/2016  Zostavax: 3/21/2011  Shingrix; completed 11/2018  Covid: x 3    MEDCARD: Reviewed  ROS:  No fever, chills, or night sweats  No visual disturbance or d/c  No ear or sinus pain or pressure  No dysphagia or early satiety  No chest pain   Denied dehydration, caffeine, decongestants  No cough or wheezing  No PND or orthopnea  No GERD or abdominal pain  No change in bowels or blood in stool  No hematuria or dysuria;   No vaginal bleeding or pelvic pain or bloating  No skin rashes or lesions  No joint swelling or erythema  No unusual HA or focal deficits  No cold or heat intolerance  No increased thirst or urination  No unusual bruising or bleeding  ADL's: 100% independent  Memory: Good, delayed recall  Mental  health: stress better, spouse prostate cancer stable, and heart valve surgeries completed and spouse doing better  Advance Directives:+ will, + living will, and M/POA   Nutrition: Good  Gait: No recent falls, s/p TKR- left,   ++ chronic back pain, improved s/p TKR  Safety: Intact  Urinary incontinence: n/a  Remainder of review negative except as previously noted    PMHX:Reviewed  PSHX: Reviewed  SHX: Reviewed      PE:  VS: As noted  GENERAL: Well developed well nourished, alert and oriented in NAD  Conversant and co-operative  EYES: Conjunctiva and lids normal, sclera anicteric  ENT: canals w/o significant cerumen, TM;s-unremarkable  Nasal- sl injected w/o exudate  O/p injected w/o exudate  NECK: Supple w/o thyromegaly or lymphadenopathy  RESPIRATORY: Efforts are unlabored; lungs are CTAP  CARDIOVASCULAR: Heart RRR w/o mumur, gallop or rub; No carotid bruits noted  1+ pedal pulses; no LE edema noted  GASTROINTESTINAL: Bowel sounds are present, soft, nontender, nondistended  No hepatosplenomegaly noted.  MUSCULOSKELETAL: Gait normal. No clubbing, cyanosis, or edema noted.  Spine:NT, tender over the right SI region  Neg SLR  NEUROLOGIC: BALES. No tremor noted  SKIN: Warm and dry    IMPRESSION:    HTN, stable  -check BP at home, call w/ abnormal's  -continue chlorthalidone 25mg qd pn  -continue low salt diet  CKD , III, asx  -continue hydration, water is best  -caution NSAIDS  Hypercholesterolemia,stable  -continue rosuvastatin 10mg qd  -continue low fat diet   Aortic atherosclerosis, asx  Ascending aorta- ULN 3.7cm-  (AAA scan  11/2018- WNL)  GERD, stable  -continue famotidine 40mg q hs prn  Insomnia, chronic  Pre-DM, stable  IFBS,stable  -lab pending  -diabetic diet  Inflammatory spondylopathy L-spine, improved s/p TKR  URI w/ post viral cough  -Rx CS  HM  -Mammo  -reviewed vaccine recs  -HD flu vaccine today  -rtc 6 mos/prn    ]

## 2025-03-10 ENCOUNTER — RESULTS FOLLOW-UP (OUTPATIENT)
Dept: INTERNAL MEDICINE | Facility: CLINIC | Age: 85
End: 2025-03-10

## 2025-03-10 ENCOUNTER — LAB VISIT (OUTPATIENT)
Dept: LAB | Facility: HOSPITAL | Age: 85
End: 2025-03-10
Attending: INTERNAL MEDICINE
Payer: MEDICARE

## 2025-03-10 DIAGNOSIS — R73.03 PRE-DIABETES: ICD-10-CM

## 2025-03-10 DIAGNOSIS — I10 ESSENTIAL HYPERTENSION: ICD-10-CM

## 2025-03-10 DIAGNOSIS — N18.30 STAGE 3 CHRONIC KIDNEY DISEASE, UNSPECIFIED WHETHER STAGE 3A OR 3B CKD: ICD-10-CM

## 2025-03-10 DIAGNOSIS — E78.2 MIXED HYPERLIPIDEMIA: ICD-10-CM

## 2025-03-10 LAB
ALBUMIN SERPL BCP-MCNC: 3.9 G/DL (ref 3.5–5.2)
ALP SERPL-CCNC: 59 U/L (ref 40–150)
ALT SERPL W/O P-5'-P-CCNC: 23 U/L (ref 10–44)
ANION GAP SERPL CALC-SCNC: 8 MMOL/L (ref 8–16)
AST SERPL-CCNC: 32 U/L (ref 10–40)
BILIRUB SERPL-MCNC: 1.6 MG/DL (ref 0.1–1)
BUN SERPL-MCNC: 19 MG/DL (ref 8–23)
CALCIUM SERPL-MCNC: 9 MG/DL (ref 8.7–10.5)
CHLORIDE SERPL-SCNC: 100 MMOL/L (ref 95–110)
CHOLEST SERPL-MCNC: 160 MG/DL (ref 120–199)
CHOLEST/HDLC SERPL: 3.2 {RATIO} (ref 2–5)
CO2 SERPL-SCNC: 28 MMOL/L (ref 23–29)
CREAT SERPL-MCNC: 0.8 MG/DL (ref 0.5–1.4)
EST. GFR  (NO RACE VARIABLE): >60 ML/MIN/1.73 M^2
ESTIMATED AVG GLUCOSE: 108 MG/DL (ref 68–131)
GLUCOSE SERPL-MCNC: 104 MG/DL (ref 70–110)
HBA1C MFR BLD: 5.4 % (ref 4–5.6)
HDLC SERPL-MCNC: 50 MG/DL (ref 40–75)
HDLC SERPL: 31.3 % (ref 20–50)
LDLC SERPL CALC-MCNC: 83 MG/DL (ref 63–159)
NONHDLC SERPL-MCNC: 110 MG/DL
POTASSIUM SERPL-SCNC: 3.6 MMOL/L (ref 3.5–5.1)
PROT SERPL-MCNC: 6.9 G/DL (ref 6–8.4)
SODIUM SERPL-SCNC: 136 MMOL/L (ref 136–145)
TRIGL SERPL-MCNC: 135 MG/DL (ref 30–150)

## 2025-03-10 PROCEDURE — 80061 LIPID PANEL: CPT | Performed by: INTERNAL MEDICINE

## 2025-03-10 PROCEDURE — 80053 COMPREHEN METABOLIC PANEL: CPT | Performed by: INTERNAL MEDICINE

## 2025-03-10 PROCEDURE — 83036 HEMOGLOBIN GLYCOSYLATED A1C: CPT | Performed by: INTERNAL MEDICINE

## 2025-05-05 ENCOUNTER — HOSPITAL ENCOUNTER (OUTPATIENT)
Dept: RADIOLOGY | Facility: CLINIC | Age: 85
Discharge: HOME | End: 2025-05-05
Attending: INTERNAL MEDICINE
Payer: MEDICARE

## 2025-05-05 DIAGNOSIS — Z12.31 ENCOUNTER FOR SCREENING MAMMOGRAM FOR BREAST CANCER: ICD-10-CM

## 2025-05-05 PROCEDURE — 77063 BREAST TOMOSYNTHESIS BI: CPT | Mod: 26,,, | Performed by: RADIOLOGY

## 2025-05-05 PROCEDURE — 77067 SCR MAMMO BI INCL CAD: CPT | Mod: 26,,, | Performed by: RADIOLOGY

## 2025-05-05 PROCEDURE — 77067 SCR MAMMO BI INCL CAD: CPT | Mod: TC,PO

## 2025-08-27 DIAGNOSIS — I10 ESSENTIAL HYPERTENSION: ICD-10-CM

## 2025-08-27 DIAGNOSIS — N18.30 STAGE 3 CHRONIC KIDNEY DISEASE, UNSPECIFIED WHETHER STAGE 3A OR 3B CKD: ICD-10-CM

## 2025-08-27 DIAGNOSIS — R73.03 PRE-DIABETES: ICD-10-CM

## 2025-08-27 DIAGNOSIS — E78.2 MIXED HYPERLIPIDEMIA: ICD-10-CM

## 2025-08-29 ENCOUNTER — LAB VISIT (OUTPATIENT)
Dept: LAB | Facility: HOSPITAL | Age: 85
End: 2025-08-29
Attending: INTERNAL MEDICINE
Payer: MEDICARE

## 2025-08-29 DIAGNOSIS — I10 ESSENTIAL HYPERTENSION: ICD-10-CM

## 2025-08-29 DIAGNOSIS — R73.03 PRE-DIABETES: ICD-10-CM

## 2025-08-29 DIAGNOSIS — E78.2 MIXED HYPERLIPIDEMIA: ICD-10-CM

## 2025-08-29 DIAGNOSIS — N18.30 STAGE 3 CHRONIC KIDNEY DISEASE, UNSPECIFIED WHETHER STAGE 3A OR 3B CKD: ICD-10-CM

## 2025-08-29 LAB
ABSOLUTE EOSINOPHIL (OHS): 0.44 K/UL
ABSOLUTE MONOCYTE (OHS): 0.5 K/UL (ref 0.3–1)
ABSOLUTE NEUTROPHIL COUNT (OHS): 2.23 K/UL (ref 1.8–7.7)
ALBUMIN SERPL BCP-MCNC: 4.2 G/DL (ref 3.5–5.2)
ALP SERPL-CCNC: 60 UNIT/L (ref 40–150)
ALT SERPL W/O P-5'-P-CCNC: 26 UNIT/L (ref 0–55)
ANION GAP (OHS): 12 MMOL/L (ref 8–16)
AST SERPL-CCNC: 28 UNIT/L (ref 0–50)
BASOPHILS # BLD AUTO: 0.06 K/UL
BASOPHILS NFR BLD AUTO: 1.4 %
BILIRUB SERPL-MCNC: 1.5 MG/DL (ref 0.1–1)
BUN SERPL-MCNC: 22 MG/DL (ref 8–23)
CALCIUM SERPL-MCNC: 9.5 MG/DL (ref 8.7–10.5)
CHLORIDE SERPL-SCNC: 103 MMOL/L (ref 95–110)
CHOLEST SERPL-MCNC: 141 MG/DL (ref 120–199)
CHOLEST/HDLC SERPL: 2.8 {RATIO} (ref 2–5)
CO2 SERPL-SCNC: 25 MMOL/L (ref 23–29)
CREAT SERPL-MCNC: 0.8 MG/DL (ref 0.5–1.4)
EAG (OHS): 108 MG/DL (ref 68–131)
ERYTHROCYTE [DISTWIDTH] IN BLOOD BY AUTOMATED COUNT: 12.8 % (ref 11.5–14.5)
GFR SERPLBLD CREATININE-BSD FMLA CKD-EPI: >60 ML/MIN/1.73/M2
GLUCOSE SERPL-MCNC: 111 MG/DL (ref 70–110)
HBA1C MFR BLD: 5.4 % (ref 4–5.6)
HCT VFR BLD AUTO: 41.5 % (ref 37–48.5)
HDLC SERPL-MCNC: 50 MG/DL (ref 40–75)
HDLC SERPL: 35.5 % (ref 20–50)
HGB BLD-MCNC: 13.6 GM/DL (ref 12–16)
IMM GRANULOCYTES # BLD AUTO: 0.01 K/UL (ref 0–0.04)
IMM GRANULOCYTES NFR BLD AUTO: 0.2 % (ref 0–0.5)
LDLC SERPL CALC-MCNC: 69.6 MG/DL (ref 63–159)
LYMPHOCYTES # BLD AUTO: 0.99 K/UL (ref 1–4.8)
MCH RBC QN AUTO: 30.8 PG (ref 27–31)
MCHC RBC AUTO-ENTMCNC: 32.8 G/DL (ref 32–36)
MCV RBC AUTO: 94 FL (ref 82–98)
NONHDLC SERPL-MCNC: 91 MG/DL
NUCLEATED RBC (/100WBC) (OHS): 0 /100 WBC
PLATELET # BLD AUTO: 185 K/UL (ref 150–450)
PMV BLD AUTO: 10.9 FL (ref 9.2–12.9)
POTASSIUM SERPL-SCNC: 3.9 MMOL/L (ref 3.5–5.1)
PROT SERPL-MCNC: 7.1 GM/DL (ref 6–8.4)
RBC # BLD AUTO: 4.42 M/UL (ref 4–5.4)
RELATIVE EOSINOPHIL (OHS): 10.4 %
RELATIVE LYMPHOCYTE (OHS): 23.4 % (ref 18–48)
RELATIVE MONOCYTE (OHS): 11.8 % (ref 4–15)
RELATIVE NEUTROPHIL (OHS): 52.8 % (ref 38–73)
SODIUM SERPL-SCNC: 140 MMOL/L (ref 136–145)
TRIGL SERPL-MCNC: 107 MG/DL (ref 30–150)
WBC # BLD AUTO: 4.23 K/UL (ref 3.9–12.7)

## 2025-08-29 PROCEDURE — 82040 ASSAY OF SERUM ALBUMIN: CPT

## 2025-08-29 PROCEDURE — 85025 COMPLETE CBC W/AUTO DIFF WBC: CPT

## 2025-08-29 PROCEDURE — 83036 HEMOGLOBIN GLYCOSYLATED A1C: CPT

## 2025-08-29 PROCEDURE — 80061 LIPID PANEL: CPT

## 2025-08-29 PROCEDURE — 36415 COLL VENOUS BLD VENIPUNCTURE: CPT | Mod: PO

## 2025-08-31 ENCOUNTER — RESULTS FOLLOW-UP (OUTPATIENT)
Dept: INTERNAL MEDICINE | Facility: CLINIC | Age: 85
End: 2025-08-31
Payer: MEDICARE

## 2025-09-02 ENCOUNTER — TELEPHONE (OUTPATIENT)
Dept: INTERNAL MEDICINE | Facility: CLINIC | Age: 85
End: 2025-09-02
Payer: MEDICARE

## 2025-09-02 ENCOUNTER — PATIENT OUTREACH (OUTPATIENT)
Dept: ADMINISTRATIVE | Facility: HOSPITAL | Age: 85
End: 2025-09-02
Payer: MEDICARE

## 2025-09-03 ENCOUNTER — OFFICE VISIT (OUTPATIENT)
Dept: INTERNAL MEDICINE | Facility: CLINIC | Age: 85
End: 2025-09-03
Payer: MEDICARE

## 2025-09-03 VITALS
OXYGEN SATURATION: 98 % | HEIGHT: 62 IN | TEMPERATURE: 97 F | RESPIRATION RATE: 16 BRPM | SYSTOLIC BLOOD PRESSURE: 120 MMHG | WEIGHT: 154 LBS | BODY MASS INDEX: 28.34 KG/M2 | DIASTOLIC BLOOD PRESSURE: 70 MMHG | HEART RATE: 64 BPM

## 2025-09-03 DIAGNOSIS — R73.01 IMPAIRED FASTING GLUCOSE: ICD-10-CM

## 2025-09-03 DIAGNOSIS — F51.01 PRIMARY INSOMNIA: ICD-10-CM

## 2025-09-03 DIAGNOSIS — I10 ESSENTIAL HYPERTENSION: Primary | ICD-10-CM

## 2025-09-03 DIAGNOSIS — K21.9 GASTROESOPHAGEAL REFLUX DISEASE WITHOUT ESOPHAGITIS: ICD-10-CM

## 2025-09-03 DIAGNOSIS — I77.89 ASCENDING AORTA ENLARGEMENT: ICD-10-CM

## 2025-09-03 DIAGNOSIS — R73.03 PRE-DIABETES: ICD-10-CM

## 2025-09-03 DIAGNOSIS — E78.2 MIXED HYPERLIPIDEMIA: ICD-10-CM

## 2025-09-03 DIAGNOSIS — N18.2 CHRONIC KIDNEY DISEASE, STAGE 2 (MILD): ICD-10-CM

## 2025-09-03 DIAGNOSIS — I70.0 AORTIC ATHEROSCLEROSIS: ICD-10-CM

## 2025-09-03 PROCEDURE — 1101F PT FALLS ASSESS-DOCD LE1/YR: CPT | Mod: CPTII,S$GLB,, | Performed by: INTERNAL MEDICINE

## 2025-09-03 PROCEDURE — 1159F MED LIST DOCD IN RCRD: CPT | Mod: CPTII,S$GLB,, | Performed by: INTERNAL MEDICINE

## 2025-09-03 PROCEDURE — 99999 PR PBB SHADOW E&M-EST. PATIENT-LVL III: CPT | Mod: PBBFAC,,, | Performed by: INTERNAL MEDICINE

## 2025-09-03 PROCEDURE — 3078F DIAST BP <80 MM HG: CPT | Mod: CPTII,S$GLB,, | Performed by: INTERNAL MEDICINE

## 2025-09-03 PROCEDURE — 99214 OFFICE O/P EST MOD 30 MIN: CPT | Mod: S$GLB,,, | Performed by: INTERNAL MEDICINE

## 2025-09-03 PROCEDURE — G2211 COMPLEX E/M VISIT ADD ON: HCPCS | Mod: S$GLB,,, | Performed by: INTERNAL MEDICINE

## 2025-09-03 PROCEDURE — 3074F SYST BP LT 130 MM HG: CPT | Mod: CPTII,S$GLB,, | Performed by: INTERNAL MEDICINE

## 2025-09-03 PROCEDURE — 3288F FALL RISK ASSESSMENT DOCD: CPT | Mod: CPTII,S$GLB,, | Performed by: INTERNAL MEDICINE

## 2025-09-03 PROCEDURE — 1126F AMNT PAIN NOTED NONE PRSNT: CPT | Mod: CPTII,S$GLB,, | Performed by: INTERNAL MEDICINE

## 2025-09-03 RX ORDER — IBUPROFEN 200 MG
200 TABLET ORAL EVERY 6 HOURS PRN
COMMUNITY

## 2025-09-03 RX ORDER — CHLORTHALIDONE 25 MG/1
25 TABLET ORAL DAILY PRN
Qty: 90 TABLET | Refills: 1 | Status: SHIPPED | OUTPATIENT
Start: 2025-09-03

## 2025-09-03 RX ORDER — FAMOTIDINE 40 MG/1
40 TABLET, FILM COATED ORAL NIGHTLY
Qty: 90 TABLET | Refills: 3 | Status: SHIPPED | OUTPATIENT
Start: 2025-09-03 | End: 2026-09-03

## (undated) DEVICE — BANDAGE ADHESIVE